# Patient Record
Sex: FEMALE | Race: WHITE | NOT HISPANIC OR LATINO | Employment: OTHER | URBAN - METROPOLITAN AREA
[De-identification: names, ages, dates, MRNs, and addresses within clinical notes are randomized per-mention and may not be internally consistent; named-entity substitution may affect disease eponyms.]

---

## 2018-04-30 ENCOUNTER — HISTORICAL (OUTPATIENT)
Dept: RADIOLOGY | Facility: HOSPITAL | Age: 73
End: 2018-04-30

## 2018-05-01 ENCOUNTER — HISTORICAL (OUTPATIENT)
Dept: ADMINISTRATIVE | Facility: HOSPITAL | Age: 73
End: 2018-05-01

## 2019-03-14 ENCOUNTER — HISTORICAL (OUTPATIENT)
Dept: ADMINISTRATIVE | Facility: HOSPITAL | Age: 74
End: 2019-03-14

## 2019-04-16 ENCOUNTER — HISTORICAL (OUTPATIENT)
Dept: LAB | Facility: HOSPITAL | Age: 74
End: 2019-04-16

## 2020-03-11 ENCOUNTER — HISTORICAL (OUTPATIENT)
Dept: ADMINISTRATIVE | Facility: HOSPITAL | Age: 75
End: 2020-03-11

## 2020-03-11 LAB
ALBUMIN SERPL-MCNC: 4.2 GM/DL (ref 3.4–5)
ALBUMIN/GLOB SERPL: 1.68 {RATIO} (ref 1.5–2.5)
ALP SERPL-CCNC: 91 UNIT/L (ref 38–126)
ALT SERPL-CCNC: 14 UNIT/L (ref 7–52)
AST SERPL-CCNC: 13 UNIT/L (ref 15–37)
BILIRUB SERPL-MCNC: 0.8 MG/DL (ref 0.2–1)
BILIRUBIN DIRECT+TOT PNL SERPL-MCNC: 0.2 MG/DL (ref 0–0.5)
BILIRUBIN DIRECT+TOT PNL SERPL-MCNC: 0.6 MG/DL
BUN SERPL-MCNC: 15 MG/DL (ref 7–18)
CALCIUM SERPL-MCNC: 9 MG/DL (ref 8.5–10)
CHLORIDE SERPL-SCNC: 101 MMOL/L (ref 98–107)
CHOLEST SERPL-MCNC: 198 MG/DL (ref 0–200)
CHOLEST/HDLC SERPL: 4.3 {RATIO}
CO2 SERPL-SCNC: 30 MMOL/L (ref 21–32)
CREAT SERPL-MCNC: 0.93 MG/DL (ref 0.6–1.3)
EST. AVERAGE GLUCOSE BLD GHB EST-MCNC: 157 MG/DL
GLOBULIN SER-MCNC: 2 GM/DL (ref 1.2–3)
GLUCOSE SERPL-MCNC: 154 MG/DL (ref 74–106)
HBA1C MFR BLD: 7.1 % (ref 4.4–6.4)
HDLC SERPL-MCNC: 46 MG/DL (ref 35–60)
LDLC SERPL CALC-MCNC: 137 MG/DL (ref 0–129)
POTASSIUM SERPL-SCNC: 4.2 MMOL/L (ref 3.5–5.1)
PROT SERPL-MCNC: 6.7 GM/DL (ref 6.4–8.2)
SODIUM SERPL-SCNC: 137 MMOL/L (ref 136–145)
TRIGL SERPL-MCNC: 175 MG/DL (ref 30–150)
VLDLC SERPL CALC-MCNC: 35 MG/DL

## 2020-06-10 ENCOUNTER — HISTORICAL (OUTPATIENT)
Dept: ADMINISTRATIVE | Facility: HOSPITAL | Age: 75
End: 2020-06-10

## 2020-06-10 LAB
ALBUMIN SERPL-MCNC: 4.3 GM/DL (ref 3.4–5)
ALBUMIN/GLOB SERPL: 1.79 {RATIO} (ref 1.5–2.5)
ALP SERPL-CCNC: 89 UNIT/L (ref 38–126)
ALT SERPL-CCNC: 16 UNIT/L (ref 7–52)
AST SERPL-CCNC: 14 UNIT/L (ref 15–37)
BILIRUB SERPL-MCNC: 0.7 MG/DL (ref 0.2–1)
BILIRUBIN DIRECT+TOT PNL SERPL-MCNC: 0.1 MG/DL (ref 0–0.5)
BILIRUBIN DIRECT+TOT PNL SERPL-MCNC: 0.6 MG/DL
BUN SERPL-MCNC: 10 MG/DL (ref 7–18)
CALCIUM SERPL-MCNC: 9.5 MG/DL (ref 8.5–10)
CHLORIDE SERPL-SCNC: 101 MMOL/L (ref 98–107)
CHOLEST SERPL-MCNC: 227 MG/DL (ref 0–200)
CHOLEST/HDLC SERPL: 4.8 {RATIO}
CO2 SERPL-SCNC: 30 MMOL/L (ref 21–32)
CREAT SERPL-MCNC: 0.83 MG/DL (ref 0.6–1.3)
EST. AVERAGE GLUCOSE BLD GHB EST-MCNC: 148 MG/DL
GLOBULIN SER-MCNC: 2.4 GM/DL (ref 1.2–3)
GLUCOSE SERPL-MCNC: 135 MG/DL (ref 74–106)
HBA1C MFR BLD: 6.8 % (ref 4.4–6.4)
HDLC SERPL-MCNC: 47 MG/DL (ref 35–60)
LDLC SERPL CALC-MCNC: 147 MG/DL (ref 0–129)
POTASSIUM SERPL-SCNC: 4.5 MMOL/L (ref 3.5–5.1)
PROT SERPL-MCNC: 6.7 GM/DL (ref 6.4–8.2)
SODIUM SERPL-SCNC: 139 MMOL/L (ref 136–145)
TRIGL SERPL-MCNC: 240 MG/DL (ref 30–150)
TSH SERPL-ACNC: 1.46 MIU/ML (ref 0.35–4.94)
VLDLC SERPL CALC-MCNC: 48 MG/DL

## 2020-10-07 ENCOUNTER — HISTORICAL (OUTPATIENT)
Dept: ADMINISTRATIVE | Facility: HOSPITAL | Age: 75
End: 2020-10-07

## 2021-09-15 ENCOUNTER — HISTORICAL (OUTPATIENT)
Dept: ADMINISTRATIVE | Facility: HOSPITAL | Age: 76
End: 2021-09-15

## 2021-09-15 LAB
ABS NEUT (OLG): 5.3 X10(3)/MCL (ref 2.1–9.2)
ALBUMIN SERPL-MCNC: 4.5 GM/DL (ref 3.4–5)
ALBUMIN/GLOB SERPL: 2.14 {RATIO} (ref 1.5–2.5)
ALP SERPL-CCNC: 67 UNIT/L (ref 38–126)
ALT SERPL-CCNC: 20 UNIT/L (ref 7–52)
AST SERPL-CCNC: 19 UNIT/L (ref 15–37)
BILIRUB SERPL-MCNC: 0.6 MG/DL (ref 0.2–1)
BILIRUBIN DIRECT+TOT PNL SERPL-MCNC: 0.1 MG/DL (ref 0–0.5)
BILIRUBIN DIRECT+TOT PNL SERPL-MCNC: 0.5 MG/DL
BUN SERPL-MCNC: 12 MG/DL (ref 7–18)
CALCIUM SERPL-MCNC: 9.4 MG/DL (ref 8.5–10)
CHLORIDE SERPL-SCNC: 101 MMOL/L (ref 98–107)
CHOLEST SERPL-MCNC: 212 MG/DL (ref 0–200)
CHOLEST/HDLC SERPL: 4.6 {RATIO}
CO2 SERPL-SCNC: 28 MMOL/L (ref 21–32)
CREAT SERPL-MCNC: 0.94 MG/DL (ref 0.6–1.3)
CREAT UR-MCNC: 200 MG/DL
DEPRECATED CALCIDIOL+CALCIFEROL SERPL-MC: 59 NG/ML (ref 30–80)
ERYTHROCYTE [DISTWIDTH] IN BLOOD BY AUTOMATED COUNT: 12.3 % (ref 11.5–17)
EST CREAT CLEARANCE SER (OHS): 61.64 ML/MIN
EST. AVERAGE GLUCOSE BLD GHB EST-MCNC: 137 MG/DL
GLOBULIN SER-MCNC: 2.1 GM/DL (ref 1.2–3)
GLUCOSE SERPL-MCNC: 115 MG/DL (ref 74–106)
HBA1C MFR BLD: 6.4 % (ref 4.4–6.4)
HCT VFR BLD AUTO: 42.9 % (ref 37–47)
HDLC SERPL-MCNC: 46 MG/DL (ref 35–60)
HGB BLD-MCNC: 14.1 GM/DL (ref 12–16)
LDLC SERPL CALC-MCNC: 137 MG/DL (ref 0–129)
LYMPHOCYTES # BLD AUTO: 1.5 X10(3)/MCL (ref 0.6–3.4)
LYMPHOCYTES NFR BLD AUTO: 20.9 % (ref 13–40)
MCH RBC QN AUTO: 29.1 PG (ref 27–31.2)
MCHC RBC AUTO-ENTMCNC: 33 GM/DL (ref 32–36)
MCV RBC AUTO: 88 FL (ref 80–94)
MICROALBUMIN UR-MCNC: 30 MG/L
MICROALBUMIN/CREAT RATIO PNL UR: <30 MG/GM
MONOCYTES # BLD AUTO: 0.4 X10(3)/MCL (ref 0.1–1.3)
MONOCYTES NFR BLD AUTO: 5.2 % (ref 0.1–24)
NEUTROPHILS NFR BLD AUTO: 73.9 % (ref 47–80)
PLATELET # BLD AUTO: 276 X10(3)/MCL (ref 130–400)
PMV BLD AUTO: 9.2 FL (ref 9.4–12.4)
POTASSIUM SERPL-SCNC: 4.8 MMOL/L (ref 3.5–5.1)
PROT SERPL-MCNC: 6.6 GM/DL (ref 6.4–8.2)
RBC # BLD AUTO: 4.85 X10(6)/MCL (ref 4.2–5.4)
SODIUM SERPL-SCNC: 139 MMOL/L (ref 136–145)
TRIGL SERPL-MCNC: 219 MG/DL (ref 30–150)
TSH SERPL-ACNC: 2.07 MIU/ML (ref 0.35–4.94)
VLDLC SERPL CALC-MCNC: 43.8 MG/DL
WBC # SPEC AUTO: 7.2 X10(3)/MCL (ref 4.5–11.5)

## 2022-03-25 ENCOUNTER — HISTORICAL (OUTPATIENT)
Dept: ADMINISTRATIVE | Facility: HOSPITAL | Age: 77
End: 2022-03-25

## 2022-03-25 ENCOUNTER — HISTORICAL (OUTPATIENT)
Dept: RADIOLOGY | Facility: HOSPITAL | Age: 77
End: 2022-03-25

## 2022-04-06 ENCOUNTER — HISTORICAL (OUTPATIENT)
Dept: CARDIOLOGY | Facility: HOSPITAL | Age: 77
End: 2022-04-06

## 2022-04-10 ENCOUNTER — HISTORICAL (OUTPATIENT)
Dept: ADMINISTRATIVE | Facility: HOSPITAL | Age: 77
End: 2022-04-10

## 2022-04-11 ENCOUNTER — HISTORICAL (OUTPATIENT)
Dept: ADMINISTRATIVE | Facility: HOSPITAL | Age: 77
End: 2022-04-11

## 2022-04-11 LAB
BUN SERPL-MCNC: 11.9 MG/DL (ref 9.8–20.1)
CALCIUM SERPL-MCNC: 9.6 MG/DL (ref 8.7–10.5)
CHLORIDE SERPL-SCNC: 101 MMOL/L (ref 98–107)
CO2 SERPL-SCNC: 29 MMOL/L (ref 23–31)
CREAT SERPL-MCNC: 0.86 MG/DL (ref 0.55–1.02)
CREAT/UREA NIT SERPL: 14
ERYTHROCYTE [DISTWIDTH] IN BLOOD BY AUTOMATED COUNT: 12.7 % (ref 11.5–17)
GLUCOSE SERPL-MCNC: 126 MG/DL (ref 82–115)
HCT VFR BLD AUTO: 40.4 % (ref 37–47)
HEMOLYSIS INTERF INDEX SERPL-ACNC: 2
HGB BLD-MCNC: 13.1 G/DL (ref 12–16)
ICTERIC INTERF INDEX SERPL-ACNC: 0
LIPEMIC INTERF INDEX SERPL-ACNC: 22
MCH RBC QN AUTO: 28.2 PG (ref 27–31)
MCHC RBC AUTO-ENTMCNC: 32.4 G/DL (ref 33–36)
MCV RBC AUTO: 87.1 FL (ref 80–94)
PLATELET # BLD AUTO: 283 10*3/UL (ref 130–400)
PMV BLD AUTO: 10.1 FL (ref 9.4–12.4)
POTASSIUM SERPL-SCNC: 4.6 MMOL/L (ref 3.5–5.1)
RBC # BLD AUTO: 4.64 10*6/UL (ref 4.2–5.4)
SODIUM SERPL-SCNC: 138 MMOL/L (ref 136–145)
WBC # SPEC AUTO: 6.5 10*3/UL (ref 4.5–11.5)

## 2022-04-13 ENCOUNTER — HISTORICAL (OUTPATIENT)
Dept: CARDIOLOGY | Facility: HOSPITAL | Age: 77
End: 2022-04-13

## 2022-04-29 VITALS
HEIGHT: 61 IN | BODY MASS INDEX: 32.92 KG/M2 | DIASTOLIC BLOOD PRESSURE: 78 MMHG | BODY MASS INDEX: 32.42 KG/M2 | SYSTOLIC BLOOD PRESSURE: 140 MMHG | HEIGHT: 61 IN | HEIGHT: 61 IN | DIASTOLIC BLOOD PRESSURE: 92 MMHG | SYSTOLIC BLOOD PRESSURE: 140 MMHG | WEIGHT: 171.75 LBS | DIASTOLIC BLOOD PRESSURE: 86 MMHG | WEIGHT: 172.63 LBS | SYSTOLIC BLOOD PRESSURE: 138 MMHG | WEIGHT: 174.38 LBS | DIASTOLIC BLOOD PRESSURE: 78 MMHG | HEIGHT: 61 IN | WEIGHT: 172.19 LBS | SYSTOLIC BLOOD PRESSURE: 136 MMHG | BODY MASS INDEX: 32.51 KG/M2 | BODY MASS INDEX: 32.59 KG/M2

## 2022-05-02 NOTE — HISTORICAL [facility] CERNER
This is a historical note converted from Renzo. Formatting and pictures may have been removed.  Please reference Renzo for original formatting and attached multimedia. Chief Complaint  RECHECK LOWER BACK PAIN, NOT BETTER SINCE PT  History of Present Illness  Worsening low back pain over past 2 years. Completed 12 session of physical therapy at [facility] but it only seemed to make pain worse. Denies any LE weakness or numbness.  Pain mainly to lower?back that is constant. Limits things that she can do around the house.  Review of Systems  Constitutional:?no fever, no weakness, no weight loss, no fatigue  Musculoskeletal:See HPI  Integumentary:?no skin rash or abnormal lesion  Neuro:?no headaches, dizziness, or weakness  ?  Physical Exam  Vitals & Measurements  T:?36.8? ?C (Oral)? HR:?68(Peripheral)? BP:?138/78?  HT:?154?cm? WT:?78.1?kg? BMI:?32.93?  General:?Well developed, well-nourished, in no acute distress  M/S:?Paralumbar tenderness, -SLR, Limited ROM to L-spine  Neuro:?no motor/sensory deficits, Reflexes 2+ throughout, CN II-XII intact  Integumentary:?no rashes or skin lesions present  ?  ?  Assessment/Plan  1.?DDD (degenerative disc disease), lumbar?M51.36  ?Xray: DDD, Anterolithesis to L4  ?Failed physical therapy (MTS)  ?MRI L-spine without contrast  ?Start Meloxicam 7.5mg PO q day (30,1)  Ordered:  Office/Outpatient Visit Level 3 Established 11713 PC, DDD (degenerative disc disease), lumbar, [facility], 03/14/19 14:03:00 CDT  Schedule Diagnostics Study, MRI L-spine without contrast, First available, 03/14/19 14:05:00 CDT, DDD (degenerative disc disease), lumbar  ?  Lumbar pain?M54.5  Ordered:  XR Spine Lumbar 2 or 3 Views, Routine, 03/14/19 13:43:00 CDT, Back Pain, None, Ambulatory, Rad Type, Lumbar pain, [facility], 03/14/19 13:43:00 CDT  ?  Orders:  meloxicam, 7.5 mg = 1 tab(s), Oral, Daily, # 30 tab(s), 2 Refill(s), Pharmacy: [facility]/pharmacy #[id number]  Clinic Follow-up PRN,  03/14/19 14:03:00 CDT, [facility], Future Order   Problem List/Past Medical History  Ongoing  Benign essential HTN  Chronic GERD  Diabetes mellitus, type II  Hypothyroidism  IC (interstitial cystitis)  Osteoarthritis of right knee  Stress incontinence, female  Historical  Sebaceous cyst  Procedure/Surgical History  Colonoscopy (03/11/2013)  Esophagogastroduodenoscopy (03/04/2013)  Arthroplasty of right shoulder  Bunionectomy  Cholecystectomy  Hysterectomy  Suspension of bladder   Medications  Aspir 81 oral delayed release tablet, 81 mg= 1 tab(s), Oral, Daily  carvedilol 3.125 mg oral tablet, See Instructions, 1 refills  celecoxib 200 mg oral capsule, See Instructions, 2 refills  esomeprazole 40 mg oral DR capsule, 40 mg= 1 cap(s), Oral, Daily, 3 refills  ezetimibe 10 mg oral tablet, See Instructions, 1 refills  Flax Seed Oil oral capsule  glyburide-metformin 1.25 mg-250 mg oral tablet, See Instructions, 1 refills  levothyroxine 50 mcg (0.05 mg) oral tablet, See Instructions, 1 refills  meclizine 25 mg oral tablet, 25 mg= 1 tab(s), Oral, BID  meloxicam 7.5 mg oral tablet, 7.5 mg= 1 tab(s), Oral, Daily, 2 refills  metformin 500 mg oral tablet, See Instructions, 1 refills  omeprazole 40 mg oral DR capsule, 40 mg= 1 cap(s), Oral, Daily, 2 refills  ONE TOUCH ULTRA BLUE TEST STRP, See Instructions, 5 refills  traMADol 50 mg oral tablet, 50 mg= 1 tab(s), Oral, q4hr  Vitamin B-12 1000 mcg oral tablet, 1000 mcg= 1 tab(s), Oral, Daily  Vitamin D3 2000 intl units oral tablet, 2000 IntUnit= 1 tab(s), Oral, Daily  vitamin E 400 intl units oral capsule, 400 IntUnit= 1 cap(s), Oral, Daily  Allergies  Latex?(UNKNOWN)  niacin?(UNKNOWN)  Social History  Alcohol  Current, Beer, Wine, 1-2 times per month, 04/09/2018  Employment/School  Retired, 04/09/2018  Exercise  Exercise duration: 3. Exercise frequency: 1-2 times/week. Exercise type: Aerobics., 04/09/2018  Home/Environment  Lives with Spouse.,  04/09/2018  Nutrition/Health  Diabetic, 04/09/2018  Substance Abuse  Never, 04/09/2018  Tobacco  Never (less than 100 in lifetime), N/A, 03/14/2019  Family History  Acute myocardial infarction.: Mother.  Cancer: Sister.  Diabetes mellitus type 2: Father.  Immunizations  Vaccine Date Status   influenza virus vaccine, inactivated 12/07/2018 Given   influenza virus vaccine, inactivated 10/02/2017 Recorded   Health Maintenance  Health Maintenance  ???Pending?(in the next year)  ??? ??Due?  ??? ? ? ?ADL Screening due??03/14/19??and every 1??year(s)  ??? ? ? ?Advance Directive due??03/14/19??and every 1??year(s)  ??? ? ? ?Alcohol Misuse Screening due??03/14/19??and every 1??year(s)  ??? ? ? ?Bone Density Screening due??03/14/19??Variable frequency  ??? ? ? ?Cognitive Screening due??03/14/19??and every 1??year(s)  ??? ? ? ?Diabetes Maintenance-Microalbumin due??03/14/19??Variable frequency  ??? ? ? ?Diabetes Maintenance-Urine Dipstick due??03/14/19??Variable frequency  ??? ? ? ?Diabetes Maintenance-Eye Exam due??03/14/19??and every?  ??? ? ? ?Diabetes Maintenance-Foot Exam due??03/14/19??and every?  ??? ? ? ?Fall Risk Assessment due??03/14/19??and every 1??year(s)  ??? ? ? ?Functional Assessment due??03/14/19??and every 1??year(s)  ??? ? ? ?Geriatric Depression Screening due??03/14/19??and every 1??year(s)  ??? ? ? ?Hypertension Management-Education due??03/14/19??and every 1??year(s)  ??? ? ? ?Pneumococcal Vaccine due??03/14/19??Variable frequency  ??? ? ? ?Pneumococcal Vaccine due??03/14/19??and every?  ??? ? ? ?Smoking Cessation due??03/14/19??Variable frequency  ??? ? ? ?Tetanus Vaccine due??03/14/19??and every 10??year(s)  ??? ? ? ?Zoster Vaccine due??03/14/19??and every 100??year(s)  ??? ??Due In Future?  ??? ? ? ?Aspirin Therapy for CVD Prevention not due until??04/05/19??and every 1??year(s)  ??? ? ? ?Diabetes Maintenance-Fasting Lipid Profile not due until??12/07/19??and every 1??year(s)  ??? ? ? ?Diabetes  Maintenance-HgbA1c not due until??12/07/19??and every 1??year(s)  ??? ? ? ?Hypertension Management-BMP not due until??12/07/19??and every 1??year(s)  ??? ? ? ?Diabetes Maintenance-Serum Creatinine not due until??12/07/19??and every 1??year(s)  ??? ? ? ?Hypertension Management-Blood Pressure not due until??03/13/20??and every 1??year(s)  ???Satisfied?(in the past 1 year)  ??? ??Satisfied?  ??? ? ? ?Aspirin Therapy for CVD Prevention on??04/05/18.  ??? ? ? ?Blood Pressure Screening on??03/14/19.??Satisfied by Neena Ceballos LPN  ??? ? ? ?Body Mass Index Check on??03/14/19.??Satisfied by Neena Ceballos LPN  ??? ? ? ?Breast Cancer Screening on??04/30/18.??Satisfied by Jacqueline Ballard  ??? ? ? ?Breast Cancer Screening ([facility]) on??04/30/18.??Satisfied by Jacqueline Ballard  ??? ? ? ?Depression Screening on??03/14/19.??Satisfied by Neena Ceballos LPN  ??? ? ? ?Diabetes Maintenance-Fasting Lipid Profile on??12/07/18.??Satisfied by Bozena Philip  ??? ? ? ?Diabetes Maintenance-HgbA1c on??12/07/18.??Satisfied by Jame Buchanan  ??? ? ? ?Diabetes Maintenance-Serum Creatinine on??12/07/18.??Satisfied by Bozena Philip  ??? ? ? ?Diabetes Screening on??12/07/18.??Satisfied by Bozena Philip  ??? ? ? ?Hypertension Management-BMP on??12/07/18.??Satisfied by Bozena Philip  ??? ? ? ?Hypertension Management-Blood Pressure on??03/14/19.??Satisfied by Neena Ceballos LPN  ??? ? ? ?Influenza Vaccine on??12/07/18.??Satisfied by Neena Ceballos LPN  ??? ? ? ?Lipid Screening on??12/07/18.??Satisfied by Bozena Philip  ??? ? ? ?Obesity Screening on??03/14/19.??Satisfied by Neena Ceballos LPN  ?  ?

## 2022-05-02 NOTE — HISTORICAL [facility] CERNER
This is a historical note converted from Renzo. Formatting and pictures may have been removed.  Please reference Renzo for original formatting and attached multimedia. History of Present Illness  74 year old WF presents for 3-month follow-up for diabetes hypertension and hyperlipidemia.? Overall doing well with no complaints, she is concerned about her weight gain?she has been having problems losing weight despite?her compliance with an ADA diet.? Minimal exercise?overall  PMH: HLD, GERD, Hypothyroidism, Type II DM, IC, Stress Incontinence  ?  ?  Review of Systems  General:???Patient reports energy level is??  good. Denies weight change.??Denies fever,chills, night sweats, or weakness.??  Denies fatigue.?Integument:???Denies any nevus changes, rashes, urticaria,??or sores.??Also denies itching or areas of numbness.  HEENT:???Denies vision changes or eye pain.??No sore throat, ear pain, sinus pressure or discharge.  Cardiovascular:???Denies chest pain, palpitations, dyspnea on exertion, orthopnea.  Respiratory:???No cough, wheezing, shortness of breath, or sputum.  GI:???Denies nausea, emesis, constipation, diarrhea, melena, hematochezia or abdominal pain  ?  ?  Physical Exam  General:???Well-developed and??nourished, no apparent distress, alert and oriented??4.  Integument:???Skin is intact with no erythema.??No pustules or vesicles.??No rash or scale. No Lymphadenopathy.??No urticaria.??No abnormal nevi.  HEENT:???PERRLA, EOMI ; TMs and EACs clear, normal turbinates with no erythema, normal mucosa, no sinus tenderness; no erythema or exudate of mouth or pharynx.  Neck:???Supple, no lymphadenopathy, no thyromegaly, no bruits, no jugular venous distention.  Cardiovascular:???Regular rhythm and rate, no murmurs, radial and dorsal pedal pulses 2+ bilaterally.  Respiratory:???Lungs clear to auscultation bilaterally, no wheezes, no crackles, no rhonchi.??Good air movement.  Abdomen:???NABS, soft, nontender, no  hepatosplenomegaly, no masses, no guarding or rebound.?  ?  ?  Assessment/Plan  Diabetes?E11.9  ?Labs today, further orders after.? We will considered?stopping the glyburide?if her A1c is controlled?to help with weight loss,?we will also wait for her TSH.  Ordered:  Clinic Follow up, *Est. 09/10/20 3:00:00 CDT, Order for future visit, Diabetes  HTN (hypertension), benign, [facility]  Hemoglobin A1c, Routine collect, 06/10/20 9:58:00 CDT, Blood, Stop date 06/10/20 9:58:00 CDT, Lab Collect, Diabetes, 06/10/20 9:58:00 CDT  Office/Outpatient Visit Level 4 Established 10348 PC, Diabetes  Hyperlipidemia, mild  HTN (hypertension), benign, [facility], 06/10/20 10:48:00 CDT  ?  HTN (hypertension), benign?I10  ?Trolled  Ordered:  Clinic Follow up, *Est. 09/10/20 3:00:00 CDT, Order for future visit, Diabetes  HTN (hypertension), benign, [facility]  Comprehensive Metabolic Panel, Routine collect, 06/10/20 9:58:00 CDT, Blood, Stop date 06/10/20 9:58:00 CDT, Lab Collect, HTN (hypertension), benign, 06/10/20 9:58:00 CDT  Office/Outpatient Visit Level 4 Established 11407 PC, Diabetes  Hyperlipidemia, mild  HTN (hypertension), benign, [facility], 06/10/20 10:48:00 CDT  ?  Hyperlipidemia, mild?E78.5  ?Controlled  Ordered:  Lipid Panel, Routine collect, 06/10/20 9:58:00 CDT, Blood, Stop date 06/10/20 9:58:00 CDT, Lab Collect, Hyperlipidemia, mild, 06/10/20 9:58:00 CDT  Office/Outpatient Visit Level 4 Established 03315 PC, Diabetes  Hyperlipidemia, mild  HTN (hypertension), benign, [facility], 06/10/20 10:48:00 CDT  ?  Hypothyroidism?E03.9  See above  Ordered:  Thyroid Stimulating Hormone, Routine collect, 06/10/20 9:58:00 CDT, Blood, Stop date 06/10/20 9:58:00 CDT, Lab Collect, Hypothyroidism, 06/10/20 9:58:00 CDT  ?  Referrals  Clinic Follow up, *Est. 09/10/20 3:00:00 CDT, Order for future visit, Diabetes  HTN (hypertension), benign, [facility]   Problem List/Past Medical History  Ongoing  Benign essential  HTN  Chronic GERD  Diabetes mellitus, type II  HLD (hyperlipidemia)  Hypothyroidism  IC (interstitial cystitis)  Low serum vitamin D  Osteoarthritis of right knee  Stress incontinence, female  Historical  Sebaceous cyst  Procedure/Surgical History  LUMBAR SX (05/23/2019)  Esophagogastroduodenoscopy (04/16/2019)  Colonoscopy (03/11/2013)  Esophagogastroduodenoscopy (03/04/2013)  Arthroplasty of right shoulder  Bunionectomy  Cholecystectomy  Hysterectomy  Suspension of bladder   Medications  Aspir 81 oral delayed release tablet, 81 mg= 1 tab(s), Oral, Daily  carvedilol 3.125 mg oral tablet, See Instructions  Cinnamon 500 mg oral capsule, 1000 mg= 2 cap(s), Oral, BID  esomeprazole 40 mg oral DR capsule, 40 mg= 1 cap(s), Oral, Daily, 3 refills  Flax Seed Oil oral capsule  glyburide-metformin 1.25 mg-250 mg oral tablet, See Instructions  levothyroxine 50 mcg (0.05 mg) oral tablet, See Instructions,? ?Not taking  meclizine 25 mg oral tablet, 25 mg= 1 tab(s), Oral, BID  metformin 500 mg oral tablet, 500 mg= 1 tab(s), Oral, BID, 1 refills  ONE TOUCH ULTRA BLUE TEST STRIP, See Instructions, 3 refills  ONE TOUCH ULTRA BLUE TEST STRIPS, See Instructions, 1 refills  red yeast rice 600 mg oral capsule, 1200 mg= 2 cap(s), Oral, BID  Vitamin B-12 1000 mcg oral tablet, 1000 mcg= 1 tab(s), Oral, Daily  Vitamin D3 2000 intl units oral tablet, 2000 IntUnit= 1 tab(s), Oral, Daily  vitamin E 400 intl units oral capsule, 400 IntUnit= 1 cap(s), Oral, Daily  Allergies  Latex?(UNKNOWN)  niacin?(UNKNOWN)  Social History  Abuse/Neglect  No, 12/09/2019  Alcohol  Current, Beer, Wine, 1-2 times per month, 04/09/2018  Employment/School  Retired, 04/09/2018  Exercise  Exercise duration: 3. Exercise frequency: 1-2 times/week. Exercise type: Aerobics., 04/09/2018  Home/Environment  Lives with Spouse., 04/09/2018  Nutrition/Health  Diabetic, 04/09/2018  Substance Use  Never, 04/09/2018  Tobacco  Never (less than 100 in lifetime), N/A,  12/09/2019  Family History  Acute myocardial infarction.: Mother.  Cancer: Sister.  Diabetes mellitus type 2: Father.  Immunizations  Vaccine Date Status   influenza virus vaccine, inactivated 12/09/2019 Given   influenza virus vaccine, inactivated 12/07/2018 Given   influenza virus vaccine, inactivated 10/02/2017 Recorded   Health Maintenance  Health Maintenance  ???Pending?(in the next year)  ??? ??OverDue  ??? ? ? ?Aspirin Therapy for CVD Prevention due??04/05/19??and every 1??year(s)  ??? ? ? ?Advance Directive due??01/01/20??and every 1??year(s)  ??? ? ? ?Obesity Screening due??01/01/20??and every 1??year(s)  ??? ? ? ?Smoking Cessation (Diabetes) due??04/30/20??and every 2??year(s)  ??? ? ? ?Diabetes Maintenance-Foot Exam due??06/06/20??and every 1??year(s)  ??? ??Due?  ??? ? ? ?ADL Screening due??06/10/20??and every 1??year(s)  ??? ? ? ?Bone Density Screening due??06/10/20??Variable frequency  ??? ? ? ?Diabetes Maintenance-Urine Dipstick due??06/10/20??Variable frequency  ??? ? ? ?Diabetes Maintenance-Microalbumin due??06/10/20??Variable frequency  ??? ? ? ?Diabetes Maintenance-Eye Exam due??06/10/20??and every?  ??? ? ? ?Hypertension Management-Education due??06/10/20??and every 1??year(s)  ??? ? ? ?Medicare Annual Wellness Exam due??06/10/20??and every 1??year(s)  ??? ? ? ?Pneumococcal Vaccine due??06/10/20??Variable frequency  ??? ? ? ?Pneumococcal Vaccine due??06/10/20??and every?  ??? ? ? ?Tetanus Vaccine due??06/10/20??and every 10??year(s)  ??? ? ? ?Zoster Vaccine due??06/10/20??and every 100??year(s)  ??? ??Due In Future?  ??? ? ? ?Hypertension Management-Blood Pressure not due until??12/08/20??and every 1??year(s)  ??? ? ? ?Cognitive Screening not due until??01/01/21??and every 1??year(s)  ??? ? ? ?Fall Risk Assessment not due until??01/01/21??and every 1??year(s)  ??? ? ? ?Functional Assessment not due until??01/01/21??and every 1??year(s)  ??? ? ? ?Diabetes Maintenance-Fasting Lipid Profile not due  until??03/11/21??and every 1??year(s)  ??? ? ? ?Diabetes Maintenance-HgbA1c not due until??03/11/21??and every 1??year(s)  ??? ? ? ?Hypertension Management-BMP not due until??03/11/21??and every 1??year(s)  ??? ? ? ?Diabetes Maintenance-Serum Creatinine not due until??03/11/21??and every 1??year(s)  ???Satisfied?(in the past 1 year)  ??? ??Satisfied?  ??? ? ? ?Blood Pressure Screening on??12/09/19.??Satisfied by Neena Ceballos LPN  ??? ? ? ?Body Mass Index Check on??12/09/19.??Satisfied by Neena Ceballos LPN  ??? ? ? ?Depression Screening on??12/09/19.??Satisfied by Neena Ceballos LPN  ??? ? ? ?Diabetes Maintenance-Serum Creatinine on??03/11/20.??Satisfied by Jame Buchanan  ??? ? ? ?Diabetes Screening on??03/11/20.??Satisfied by Jame Buchanan  ??? ? ? ?Hypertension Management-BMP on??03/11/20.??Satisfied by Jame Buchanan  ??? ? ? ?Influenza Vaccine on??12/09/19.??Satisfied by Kusum Philip CMA  ??? ? ? ?Lipid Screening on??03/11/20.??Satisfied by Jame Buchanan  ??? ? ? ?Obesity Screening on??12/09/19.??Satisfied by Neena Ceballos LPN  ?      Patient condition discussed?in detail with nurse practitioner.? Agree with plan of care?and follow-up.

## 2022-05-02 NOTE — HISTORICAL [facility] CERNER
This is a historical note converted from Renzo. Formatting and pictures may have been removed.  Please reference [facility] for original formatting and attached multimedia. Chief Complaint  6 MTH RC DM, HTN, HLD VIT D DEF FAST  History of Present Illness  75y/o WF presents for 6m follow up DM, HTN, HLD, hypothyroidism.  She is doing well without acute complaints or concerns.? She takes her medication as prescribed.  She states her fasting blood sugars?have been running 80-100s.  ?  Optho- Dr. Warren/ Dr. Davila (q6m)  Cardiology- Dr. Ivan 8/21- no med changes  Review of Systems  Constitutional:?no fever, no fatigue, no weakness  Psych: no anxiety,?no?depression, no insomnia  Respiratory:?no cough, no wheezing, no shortness of breath  Cardiovascular:?no chest pain, no palpitations, no edema  Gastrointestinal:?no abdominal pain, no nausea, vomiting, diarrhea or constipation  Hema/Lymph:?no abnormal bruising or bleeding  Endocrine:?no heat or cold intolerance, no excessive thirst or excessive urination  Integumentary:?no skin rash or abnormal lesion  Neurologic: no headache, no dizziness, no weakness or numbness  Physical Exam  Vitals & Measurements  T:?36.7? ?C (Oral)? HR:?60(Peripheral)? BP:?136/86?  HT:?154?cm? HT:?154.00?cm? WT:?77.9?kg? WT:?77.900?kg? BMI:?32.85?  General:?well-developed well-nourished in no acute distress  Neck: no thyromegaly, no?lymphadenopathy, no carotid bruits  Respiratory:?respirations even and unlabored, clear to auscultation bilaterally  Cardiovascular:?regular rate and rhythm without murmurs, gallops or rubs  Musculoskeletal:?full range of motion of all extremities/spine  Integumentary: no rashes or skin lesions present  Neurologic: grossly intact  Assessment/Plan  1.?Diabetes mellitus, type II?E11.9  ?Hemoglobin A1c, urine microalbumin-we will call patient with results.  Continue?glyburide-Metformin?1.25 mg / 250 mg twice daily, metformin 500 mg twice daily.  Ordered:  Clinic Follow up,  *Est. 03/15/22 3:00:00 CDT, Order for future visit, Diabetes mellitus, type II  Benign essential HTN  HLD (hyperlipidemia)  Hypothyroidism  Low serum vitamin D, HLink AFP  Comprehensive Metabolic Panel, Routine collect, 09/15/21 9:47:00 CDT, Blood, Stop date 09/15/21 9:47:00 CDT, Lab Collect, Benign essential HTN  Diabetes mellitus, type II  HLD (hyperlipidemia), 09/15/21 9:47:00 CDT  Hemoglobin A1c, Routine collect, 09/15/21 9:47:00 CDT, Blood, Stop date 09/15/21 9:47:00 CDT, Lab Collect, Diabetes mellitus, type II, 09/15/21 9:47:00 CDT  Lab Collection Request, 09/15/21 10:05:00 CDT, HLINK AMB - AFP, 09/15/21 10:05:00 CDT, Diabetes mellitus, type II  Microalbum/Creatinine Ratio Urine (Microalb/Creat), Routine collect, Urine, Order for future visit, 09/15/21 10:05:00 CDT, Stop date 09/15/21 10:05:00 CDT, Nurse collect, Diabetes mellitus, type II  Office/Outpatient Visit Level 3 Established 88820 PC, Diabetes mellitus, type II  Benign essential HTN  HLD (hyperlipidemia)  Hypothyroidism  Low serum vitamin D, HLINK AMB - AFP, 09/15/21 10:10:00 CDT  ?  2.?Benign essential HTN?I10  ?CBC, CMP-we will call patient with results.  Continue carvedilol 3.125 mg daily.  Ordered:  Automated Diff, Routine collect, 09/15/21 9:47:00 CDT, Blood, Collected, Stop date 09/15/21 9:47:00 CDT, Lab Collect, Benign essential HTN, 09/15/21 9:47:00 CDT  CBC w/ Auto Diff, Routine collect, 09/15/21 9:47:00 CDT, Blood, Stop date 09/15/21 9:47:00 CDT, Lab Collect, Benign essential HTN, 09/15/21 9:47:00 CDT  Clinic Follow up, *Est. 03/15/22 3:00:00 CDT, Order for future visit, Diabetes mellitus, type II  Benign essential HTN  HLD (hyperlipidemia)  Hypothyroidism  Low serum vitamin D, HLink AFP  Comprehensive Metabolic Panel, Routine collect, 09/15/21 9:47:00 CDT, Blood, Stop date 09/15/21 9:47:00 CDT, Lab Collect, Benign essential HTN  Diabetes mellitus, type II  HLD (hyperlipidemia), 09/15/21 9:47:00 CDT  Office/Outpatient Visit  Level 3 Established [id number] PC, Diabetes mellitus, type II  Benign essential HTN  HLD (hyperlipidemia)  Hypothyroidism  Low serum vitamin D, HLINK AMB - AFP, 09/15/21 10:10:00 CDT  ?  3.?HLD (hyperlipidemia)?E78.5  ?FLP-we will call patient with results.  Continue low-fat, low-cholesterol diet, continue red yeast rice,?omega-3 fish oil.  Ordered:  Clinic Follow up, *Est. 03/15/22 3:00:00 CDT, Order for future visit, Diabetes mellitus, type II  Benign essential HTN  HLD (hyperlipidemia)  Hypothyroidism  Low serum vitamin D, HLink AFP  Comprehensive Metabolic Panel, Routine collect, 09/15/21 9:47:00 CDT, Blood, Stop date 09/15/21 9:47:00 CDT, Lab Collect, Benign essential HTN  Diabetes mellitus, type II  HLD (hyperlipidemia), 09/15/21 9:47:00 CDT  Lipid Panel, Routine collect, 09/15/21 9:47:00 CDT, Blood, Stop date 09/15/21 9:47:00 CDT, Lab Collect, HLD (hyperlipidemia), 09/15/21 9:47:00 CDT  Office/Outpatient Visit Level 3 Established [id number] PC, Diabetes mellitus, type II  Benign essential HTN  HLD (hyperlipidemia)  Hypothyroidism  Low serum vitamin D, HLINK AMB - AFP, 09/15/21 10:10:00 CDT  ?  4.?Hypothyroidism?E03.9  ?TSH-we will call patient with results.  Ordered:  Clinic Follow up, *Est. 03/15/22 3:00:00 CDT, Order for future visit, Diabetes mellitus, type II  Benign essential HTN  HLD (hyperlipidemia)  Hypothyroidism  Low serum vitamin D, HLink AFP  Office/Outpatient Visit Level 3 Established [id number] PC, Diabetes mellitus, type II  Benign essential HTN  HLD (hyperlipidemia)  Hypothyroidism  Low serum vitamin D, HLINK AMB - AFP, 09/15/21 10:10:00 CDT  Thyroid Stimulating Hormone, Routine collect, 09/15/21 9:47:00 CDT, Blood, Stop date 09/15/21 9:47:00 CDT, Lab Collect, Hypothyroidism, 09/15/21 9:47:00 CDT  ?  5.?Low serum vitamin D?R79.89  ?Vitamin D level-we will call patient with results.  Continue?cholecalciferol?2000 units daily.  Ordered:  Clinic Follow up, *Est. 03/15/22 3:00:00 CDT, Order  for future visit, Diabetes mellitus, type II  Benign essential HTN  HLD (hyperlipidemia)  Hypothyroidism  Low serum vitamin D, HLink AFP  Office/Outpatient Visit Level 3 Established [id number] PC, Diabetes mellitus, type II  Benign essential HTN  HLD (hyperlipidemia)  Hypothyroidism  Low serum vitamin D, HLINK AMB - AFP, 09/15/21 10:10:00 CDT  Vitamin D, 25-Hydroxy Level, Routine collect, 09/15/21 9:47:00 CDT, Blood, Stop date 09/15/21 9:47:00 CDT, Lab Collect, Low serum vitamin D, 09/15/21 9:47:00 CDT  ?  Referrals  Clinic Follow up, *Est. 03/15/22 3:00:00 CDT, Order for future visit, Diabetes mellitus, type II  Benign essential HTN  HLD (hyperlipidemia)  Hypothyroidism  Low serum vitamin D, HLink AFP   Problem List/Past Medical History  Ongoing  Benign essential HTN  Chronic GERD  Diabetes mellitus, type II  HLD (hyperlipidemia)  Hypothyroidism  IC (interstitial cystitis)  Low serum vitamin D  Osteoarthritis of right knee  Stress incontinence, female  Historical  Sebaceous cyst  Procedure/Surgical History  LUMBAR SX (07/24/2019)  Bone and/or joint imaging; whole body (06/24/2019)  Esophagogastroduodenoscopy (04/16/2019)  Colonoscopy (03/11/2013)  Esophagogastroduodenoscopy (03/04/2013)  Arthroplasty of right shoulder  Bunionectomy  Cholecystectomy  Hysterectomy  Suspension of bladder   Medications  Aspir 81 oral delayed release tablet, 81 mg= 1 tab(s), Oral, Daily  carvedilol 3.125 mg oral tablet, See Instructions  Cinnamon 500 mg oral capsule, 1000 mg= 2 cap(s), Oral, BID  esomeprazole 40 mg oral DR capsule, 40 mg= 1 cap(s), Oral, Daily, 3 refills  Flax Seed Oil oral capsule  glyburide-metformin 1.25 mg-250 mg oral tablet, See Instructions  meclizine 25 mg oral tablet, 25 mg= 1 tab(s), Oral, BID  metformin 500 mg oral tablet, See Instructions, 1 refills  ONE TOUCH ULTRA BLUE TEST STRIP, See Instructions, 3 refills  ONE TOUCH ULTRA BLUE TEST STRIPS, See Instructions, 1 refills  ONE TOUCH ULTRA BLUE TEST  STRIPS, See Instructions, 1 refills  red yeast rice 600 mg oral capsule, 1200 mg= 2 cap(s), Oral, BID  Vitamin B-12 1000 mcg oral tablet, 1000 mcg= 1 tab(s), Oral, Daily  Vitamin D3 2000 intl units oral tablet, 2000 IntUnit= 1 tab(s), Oral, Daily  vitamin E 400 intl units oral capsule, 400 IntUnit= 1 cap(s), Oral, Daily  Allergies  lisinopril?(Other)  Latex?(UNKNOWN)  niacin?(UNKNOWN)  Social History  Abuse/Neglect  No, 09/15/2021  Alcohol  Current, Beer, Wine, 1-2 times per month, 04/09/2018  Employment/School  Retired, 04/09/2018  Exercise  Exercise duration: 3. Exercise frequency: 1-2 times/week. Exercise type: Aerobics., 04/09/2018  Home/Environment  Lives with Spouse., 04/09/2018  Nutrition/Health  Diabetic, 04/09/2018  Substance Use  Never, 04/09/2018  Tobacco  Never (less than 100 in lifetime), N/A, 09/15/2021  Family History  Acute myocardial infarction.: Mother.  Cancer: Sister.  Diabetes mellitus type 2: Father.  Immunizations  Vaccine Date Status   influenza virus vaccine, inactivated 09/15/2021 Given   COVID-19 MRNA, LNP-S, PF- Pfizer 03/04/2021 Given   COVID-19 MRNA, LNP-S, PF- Pfizer 02/11/2021 Given   influenza virus vaccine, inactivated 09/10/2020 Given   influenza virus vaccine, inactivated 12/09/2019 Given   influenza virus vaccine, inactivated 12/07/2018 Given   influenza virus vaccine, inactivated 10/02/2017 Recorded   Health Maintenance  Health Maintenance  ???Pending?(in the next year)  ??? ??OverDue  ??? ? ? ?Aspirin Therapy for CVD Prevention due??04/05/19??and every 1??year(s)  ??? ? ? ?Diabetes Maintenance-Foot Exam due??06/06/20??and every 1??year(s)  ??? ? ? ?Advance Directive due??01/02/21??and every 1??year(s)  ??? ? ? ?Cognitive Screening due??01/02/21??and every 1??year(s)  ??? ? ? ?Fall Risk Assessment due??01/02/21??and every 1??year(s)  ??? ? ? ?Functional Assessment due??01/02/21??and every 1??year(s)  ??? ??Due?  ??? ? ? ?ADL Screening due??09/15/21??and every 1??year(s)  ??? ? ?  ?Bone Density Screening due??09/15/21??Variable frequency  ??? ? ? ?Diabetes Maintenance-Eye Exam due??09/15/21??Unknown Frequency  ??? ? ? ?Diabetes Maintenance-Microalbumin due??09/15/21??Unknown Frequency  ??? ? ? ?Hypertension Management-Education due??09/15/21??and every 1??year(s)  ??? ? ? ?Pneumococcal Vaccine due??09/15/21??Unknown Frequency  ??? ? ? ?Tetanus Vaccine due??09/15/21??and every 10??year(s)  ??? ? ? ?Zoster Vaccine due??09/15/21??Unknown Frequency  ??? ??Due In Future?  ??? ? ? ?Obesity Screening not due until??01/01/22??and every 1??year(s)  ??? ? ? ?Diabetes Maintenance-HgbA1c not due until??03/10/22??and every 1??year(s)  ??? ? ? ?Hypertension Management-BMP not due until??03/10/22??and every 1??year(s)  ??? ? ? ?Diabetes Maintenance-Serum Creatinine not due until??03/10/22??and every 1??year(s)  ??? ? ? ?Diabetes Maintenance-Fasting Lipid Profile not due until??03/10/22??and every 1??year(s)  ??? ? ? ?Medicare Annual Wellness Exam not due until??03/10/22??and every 1??year(s)  ???Satisfied?(in the past 1 year)  ??? ??Satisfied?  ??? ? ? ?Blood Pressure Screening on??09/15/21.??Satisfied by Neena Ceballos LPN  ??? ? ? ?Body Mass Index Check on??09/15/21.??Satisfied by Neena Cebalols LPN  ??? ? ? ?Depression Screening on??09/15/21.??Satisfied by Neena Ceballos LPN  ??? ? ? ?Diabetes Maintenance-Serum Creatinine on??03/10/21.??Satisfied by Jame Buchanan  ??? ? ? ?Diabetes Screening on??03/10/21.??Satisfied by Jame Buchanan  ??? ? ? ?Fall Risk Assessment on??11/19/20.??Satisfied by Mari Hess LPN  ??? ? ? ?Functional Assessment on??11/19/20.??Satisfied by Mari Hess LPN  ??? ? ? ?Hypertension Management-Blood Pressure on??09/15/21.??Satisfied by Neena Ceballos LPN  ??? ? ? ?Influenza Vaccine on??09/15/21.??Satisfied by Neena Ceballos LPN  ??? ? ? ?Lipid Screening on??03/10/21.??Satisfied by Jame Buchanan  ??? ? ? ?Medicare Annual Wellness Exam  on??03/10/21.??Satisfied by TORRI Thornton Teddi  ??? ? ? ?Obesity Screening on??09/15/21.??Satisfied by Neena Ceballos LPN  ?      Patient condition discussed?in detail with nurse practitioner.? Agree with plan of care?and follow-up.

## 2022-05-02 NOTE — HISTORICAL [facility] CERNER
This is a historical note converted from Renzo. Formatting and pictures may have been removed.  Please reference Renzo for original formatting and attached multimedia. Chief Complaint  PATIENT HERE FOR RIGHT KNEE PAIN. PATIENT STATES SHE HURT HER KNEE BUT NOT SURE HOW IT HAPPENED. SHE STATES ITS BEEN ABOUT 5 WEEKS. PATIENT STATES THE PAIN IS MOSTLY ON THE MEDIAL SIDE OF THE KNEE  History of Present Illness  Knee symptoms ::knee gives way ??? Knee joint pain on the right ??? Worse with weightbearing ??? Worse in the morning  ??? Slowly worsens with extended activity ??? Is increased by bending it ??? By twisting it ??? By kneeling ??? With stairs ??? By squatting  ??? Knee joint swelling on the right??  ?? Knee joint pain not improved by rest ? ??? No clicking sensation in the right knee ??? No grating sensation in the right knee?  ??? The knee did not suddenly buckle due to contact ?? No popping sound was heard in the knee?  ??? No tingling ??? No burning sensation  Review of Systems  Review of Systems  ????Constitutional: No fever, No chills.  ????Respiratory: No shortness of breath, No cough.  ????Cardiovascular: No chest pain.  ????Gastrointestinal: No nausea, No vomiting, No diarrhea, No constipation, No heartburn.  ????Genitourinary: No dysuria, No hematuria.  ????Hematology/Lymphatics: No bleeding tendency.  ????Endocrine: No polyuria.  ????Neurologic: Alert and oriented X4, No numbness, No tingling.  ????Psychiatric: No anxiety, No depression.  Physical Exam  Vitals & Measurements  BP:?140/92?  HT:?154?cm? HT:?154?cm? WT:?78.3?kg? WT:?78.3?kg? BMI:?33.02?  PHYSICAL FINDINGS  Cardiovascular:  Arterial Pulses: ? Dorsalis pedis pulses were normal right. ? Dorsalis pedis pulses were normal right.  Musculoskeletal System:  Hips:  General/bilateral: ? No swelling of the hips. ? No induration of the hips.  Right Hip: ? Motion was normal. ? No pain was elicited by active internal rotation with the hip flexed.  ? No  pain was elicited by active external rotation with the hip flexed. ? No pain was elicited by active motion. ? No pain was elicited by passive motion.  Left Hip: ? Motion was normal.  Right Knee: ? Examined. ? Positive effusion. ?Localized swelling. ?Genu varum. ?Patella demonstrated crepitus. ?Anteromedial aspect was tender on palpation. ?Medial aspect was tender on palpation.  patella  Right Knee:  Knee Motion: Value Normal Range  Active flexion?120 degrees  Active extension? 03 degrees  ???  ? Pain was elicited throughout the range of motion. ? Swelling with a negative fluctuation test. ? No erythema. ? No warmth. ? Anterior aspect was not tender on palpation. ? Patellofemoral region was not tender on palpation. ? Medial collateral ligament was not tender on palpation. ? Lateral collateral ligament was not tender on palpation. ? No pain was elicited by motion using [place] apprehension test. ? No laxity of the posterior cruciate ligament. ? No anterior drawer sign was present. ? No one plane medial (straight) instability. ? No one plane lateral (straight) instability. ? A Lachman test did not demonstrate one plane anterior instability. ? Clarkes sign was not observed for chondromalacia.  ?  ?   ???  TESTS  Imaging:  X-Ray Knee:  AP and lateral view x-rays of the right knee with sunrise view of the patella were performed -of right knee.  ???  IMPRESSIONS RADIOLOGY TEST  Narrowing of the right knee joint space (not?bone on bone), showed sclerosis of the right knee, and osteophytes arising from the right knee.  Administered corticosteroid injection?? ?right knee ?2cc cortisone and 2cc lidocaine using sterile technique after informed verbal consent. Risks discussed with patient prior to injection. Informed the patient of the following:  -?Explained to patient that this injection in some patients will experience increased pain a few minutes after the injection and that the pain will last anywhere from 10 to 20  minutes. In order to decrease the pain you need to do the following:  o?Make sure to move the extremity in which the injection was given. If you do not move the medication sits there and can cause more pain.  o?Ice the affected joint every 2 hours for 20 minutes at a time for the next 24 hours.  o?If you are not already taking an anti-inflammatory take the following Ibuprofen/ Advil take 3 to 4 tablets every 6 to 8 hours or 2 Aleve every 12 hours for the next 5 days to help the medication work. If you cannot take anti-inflammatory take 2 extra strength Tylenol every 6 hours for the next 5 days.  ??Patient voiced understanding ?????????????????????????????????????????????????????????????????????????????  Assessment/Plan  1.?Osteoarthritis of right knee  ?  Right knee pain  Ordered:  XR Knee Right 3 Views, Routine, 05/01/18 15:41:00 CDT, Pain, None, Ambulatory, Rad Type, Right knee pain, 05/01/18 15:41:00 CDT  ?   Problem List/Past Medical History  Ongoing  Benign essential HTN  Chronic GERD  Diabetes mellitus, type II  Hypothyroidism  IC (interstitial cystitis)  Osteoarthritis of right knee  Stress incontinence, female  Historical  Sebaceous cyst  Procedure/Surgical History  Colonoscopy (03/11/2013), Esophagogastroduodenoscopy (03/04/2013), Arthroplasty of right shoulder, Bunionectomy, Cholecystectomy, Hysterectomy, Suspension of bladder.  Medications  Aspir 81 oral delayed release tablet, 81 mg= 1 tab(s), Oral, Daily  CARVEDILOL 3.125 MG TABLET, Oral, BID  esomeprazole 40 mg oral DR capsule, 40 mg= 1 cap(s), Oral, Daily, 3 refills  Flax Seed Oil oral capsule  GLYBURID-METFORMIN 1. MG, 1 tab(s), Oral, BID  LEVOTHYROXINE 50 MCG TABLET, 50 mcg= 1 tab(s), Oral  LIVALO 4 MG TABLET,? ?Not taking  meclizine 25 mg oral tablet, 25 mg= 1 tab(s), Oral, BID  METFORMIN  MG TABLET, 500 mg= 1 tab(s), Oral, BID  Vitamin B-12 1000 mcg oral tablet, 1000 mcg= 1 tab(s), Oral, Daily  Vitamin D3 2000 intl units oral tablet,  2000 IntUnit= 1 tab(s), Oral, Daily  vitamin E 400 intl units oral capsule, 400 IntUnit= 1 cap(s), Oral, Daily  Zetia 10 mg oral tablet, 10 mg= 1 tab(s), Oral, Daily  Allergies  Latex?(UNKNOWN)  niacin?(UNKNOWN)  Social History  Alcohol  Current, Beer, Wine, 1-2 times per month, 04/09/2018  Employment/School  Retired, 04/09/2018  Exercise  Exercise duration: 3. Exercise frequency: 1-2 times/week. Exercise type: Aerobics., 04/09/2018  Home/Environment  Lives with Spouse., 04/09/2018  Nutrition/Health  Diabetic, 04/09/2018  Substance Abuse  Never, 04/09/2018  Tobacco  Never smoker, 04/09/2018  Family History  Acute myocardial infarction.: Mother.  Cancer: Sister.  Diabetes mellitus type 2: Father.  Immunizations  Vaccine Date Status   influenza virus vaccine, inactivated 10/02/2017 Recorded

## 2022-09-09 PROBLEM — E11.9 TYPE 2 DIABETES MELLITUS WITHOUT COMPLICATION, WITHOUT LONG-TERM CURRENT USE OF INSULIN: Status: ACTIVE | Noted: 2022-09-09

## 2022-09-09 PROBLEM — E11.29 MICROALBUMINURIA DUE TO TYPE 2 DIABETES MELLITUS: Status: ACTIVE | Noted: 2022-09-09

## 2022-09-09 PROBLEM — R80.9 MICROALBUMINURIA DUE TO TYPE 2 DIABETES MELLITUS: Status: ACTIVE | Noted: 2022-09-09

## 2022-09-09 PROBLEM — E78.5 HYPERLIPIDEMIA: Status: ACTIVE | Noted: 2022-09-09

## 2022-09-09 PROBLEM — Z23 NEED FOR INFLUENZA VACCINATION: Status: ACTIVE | Noted: 2022-09-09

## 2022-12-10 ENCOUNTER — HOSPITAL ENCOUNTER (INPATIENT)
Facility: HOSPITAL | Age: 77
LOS: 1 days | Discharge: HOME | DRG: 303 | End: 2022-12-11
Attending: INTERNAL MEDICINE | Admitting: INTERNAL MEDICINE
Payer: MEDICARE

## 2022-12-10 DIAGNOSIS — R06.02 SOB (SHORTNESS OF BREATH): Primary | ICD-10-CM

## 2022-12-10 DIAGNOSIS — R07.9 CHEST PAIN, UNSPECIFIED TYPE: ICD-10-CM

## 2022-12-10 DIAGNOSIS — R07.9 CHEST PAIN: ICD-10-CM

## 2022-12-10 DIAGNOSIS — I25.10 CAD (CORONARY ARTERY DISEASE): ICD-10-CM

## 2022-12-10 LAB
ALBUMIN SERPL-MCNC: 4 GM/DL (ref 3.4–4.8)
ALBUMIN/GLOB SERPL: 1.5 RATIO (ref 1.1–2)
ALP SERPL-CCNC: 101 UNIT/L (ref 40–150)
ALT SERPL-CCNC: 14 UNIT/L (ref 0–55)
AST SERPL-CCNC: 14 UNIT/L (ref 5–34)
BASOPHILS # BLD AUTO: 0.03 X10(3)/MCL (ref 0–0.2)
BASOPHILS NFR BLD AUTO: 0.4 %
BILIRUBIN DIRECT+TOT PNL SERPL-MCNC: 0.4 MG/DL
BNP BLD-MCNC: <10 PG/ML
BUN SERPL-MCNC: 18.5 MG/DL (ref 9.8–20.1)
CALCIUM SERPL-MCNC: 9.6 MG/DL (ref 8.4–10.2)
CHLORIDE SERPL-SCNC: 102 MMOL/L (ref 98–107)
CO2 SERPL-SCNC: 30 MMOL/L (ref 23–31)
CREAT SERPL-MCNC: 0.85 MG/DL (ref 0.55–1.02)
EOSINOPHIL # BLD AUTO: 0.1 X10(3)/MCL (ref 0–0.9)
EOSINOPHIL NFR BLD AUTO: 1.4 %
ERYTHROCYTE [DISTWIDTH] IN BLOOD BY AUTOMATED COUNT: 15.1 % (ref 11.5–17)
GFR SERPLBLD CREATININE-BSD FMLA CKD-EPI: >60 MLS/MIN/1.73/M2
GLOBULIN SER-MCNC: 2.6 GM/DL (ref 2.4–3.5)
GLUCOSE SERPL-MCNC: 86 MG/DL (ref 82–115)
HCT VFR BLD AUTO: 37 % (ref 37–47)
HGB BLD-MCNC: 11.1 GM/DL (ref 12–16)
IMM GRANULOCYTES # BLD AUTO: 0.01 X10(3)/MCL (ref 0–0.04)
IMM GRANULOCYTES NFR BLD AUTO: 0.1 %
LYMPHOCYTES # BLD AUTO: 1.32 X10(3)/MCL (ref 0.6–4.6)
LYMPHOCYTES NFR BLD AUTO: 18.4 %
MCH RBC QN AUTO: 23.8 PG (ref 27–31)
MCHC RBC AUTO-ENTMCNC: 30 MG/DL (ref 33–36)
MCV RBC AUTO: 79.2 FL (ref 80–94)
MONOCYTES # BLD AUTO: 0.57 X10(3)/MCL (ref 0.1–1.3)
MONOCYTES NFR BLD AUTO: 8 %
NEUTROPHILS # BLD AUTO: 5.1 X10(3)/MCL (ref 2.1–9.2)
NEUTROPHILS NFR BLD AUTO: 71.7 %
NRBC BLD AUTO-RTO: 0 %
PLATELET # BLD AUTO: 331 X10(3)/MCL (ref 130–400)
PMV BLD AUTO: 10.1 FL (ref 7.4–10.4)
POTASSIUM SERPL-SCNC: 4.2 MMOL/L (ref 3.5–5.1)
PROT SERPL-MCNC: 6.6 GM/DL (ref 5.8–7.6)
RBC # BLD AUTO: 4.67 X10(6)/MCL (ref 4.2–5.4)
SODIUM SERPL-SCNC: 138 MMOL/L (ref 136–145)
TROPONIN I SERPL-MCNC: <0.01 NG/ML (ref 0–0.04)
TROPONIN I SERPL-MCNC: <0.01 NG/ML (ref 0–0.04)
WBC # SPEC AUTO: 7.2 X10(3)/MCL (ref 4.5–11.5)

## 2022-12-10 PROCEDURE — 25000003 PHARM REV CODE 250: Performed by: NURSE PRACTITIONER

## 2022-12-10 PROCEDURE — 93005 ELECTROCARDIOGRAM TRACING: CPT

## 2022-12-10 PROCEDURE — 80053 COMPREHEN METABOLIC PANEL: CPT | Performed by: NURSE PRACTITIONER

## 2022-12-10 PROCEDURE — 93010 ELECTROCARDIOGRAM REPORT: CPT | Mod: ,,, | Performed by: INTERNAL MEDICINE

## 2022-12-10 PROCEDURE — 93010 EKG 12-LEAD: ICD-10-PCS | Mod: ,,, | Performed by: INTERNAL MEDICINE

## 2022-12-10 PROCEDURE — 11000001 HC ACUTE MED/SURG PRIVATE ROOM

## 2022-12-10 PROCEDURE — 85025 COMPLETE CBC W/AUTO DIFF WBC: CPT | Performed by: NURSE PRACTITIONER

## 2022-12-10 PROCEDURE — 83880 ASSAY OF NATRIURETIC PEPTIDE: CPT | Performed by: NURSE PRACTITIONER

## 2022-12-10 PROCEDURE — 25000003 PHARM REV CODE 250: Performed by: PHYSICIAN ASSISTANT

## 2022-12-10 PROCEDURE — 99285 EMERGENCY DEPT VISIT HI MDM: CPT | Mod: 25

## 2022-12-10 PROCEDURE — 84484 ASSAY OF TROPONIN QUANT: CPT | Mod: 91 | Performed by: NURSE PRACTITIONER

## 2022-12-10 RX ORDER — ONDANSETRON 2 MG/ML
4 INJECTION INTRAMUSCULAR; INTRAVENOUS
Status: DISPENSED | OUTPATIENT
Start: 2022-12-10 | End: 2022-12-11

## 2022-12-10 RX ORDER — CARVEDILOL 3.12 MG/1
3.12 TABLET ORAL 2 TIMES DAILY
Status: DISCONTINUED | OUTPATIENT
Start: 2022-12-10 | End: 2022-12-11

## 2022-12-10 RX ORDER — ASPIRIN 325 MG
325 TABLET ORAL
Status: COMPLETED | OUTPATIENT
Start: 2022-12-10 | End: 2022-12-10

## 2022-12-10 RX ORDER — ACETAMINOPHEN 500 MG
500 TABLET ORAL
Status: COMPLETED | OUTPATIENT
Start: 2022-12-10 | End: 2022-12-10

## 2022-12-10 RX ORDER — ERGOCALCIFEROL 1.25 MG/1
50000 CAPSULE ORAL
COMMUNITY
End: 2022-12-11

## 2022-12-10 RX ADMIN — ASPIRIN 325 MG ORAL TABLET 325 MG: 325 PILL ORAL at 07:12

## 2022-12-10 RX ADMIN — ACETAMINOPHEN 500 MG: 500 TABLET ORAL at 09:12

## 2022-12-10 RX ADMIN — CARVEDILOL 3.12 MG: 3.12 TABLET, FILM COATED ORAL at 07:12

## 2022-12-10 NOTE — Clinical Note
Diagnosis: SOB (shortness of breath) [532123]   Admitting Provider:: SIRISHA DIANE [[id number]]   Future Attending Provider: SIRISHA DIANE [[id number]]   Reason for IP Medical Treatment  (Clinical interventions that can only be accomplished in the IP setting? ) :: higher level care   Estimated Length of Stay:: 2 midnights   I certify that Inpatient services for greater than or equal to 2 midnights are medically necessary:: Yes   Plans for Post-Acute care--if anticipated (pick the single best option):: A. No post acute care anticipated at this time

## 2022-12-10 NOTE — FIRST PROVIDER EVALUATION
Medical screening examination initiated.  I have conducted a focused provider triage encounter, findings are as follows:    Brief history of present illness:  77-year-old female presents to ER complaining of shortness of breath, heartburn, chest tightness since 10:00 a.m. today.  Reports took 3 sublingual nitroglycerin with mild improvement but still has the tightness across her chest.  Reports a history of cardiac stents x3 back in April of 2022.    Sees Dr. Ivan with Cardiology.    PCP is Ravi Miller.     There were no vitals filed for this visit.    Pertinent physical exam:  AAOx4    Brief workup plan:  labs and EKG, Xray    Preliminary workup initiated; this workup will be continued and followed by the physician or advanced practice provider that is assigned to the patient when roomed.

## 2022-12-11 VITALS
TEMPERATURE: 98 F | SYSTOLIC BLOOD PRESSURE: 130 MMHG | DIASTOLIC BLOOD PRESSURE: 84 MMHG | BODY MASS INDEX: 32.61 KG/M2 | RESPIRATION RATE: 18 BRPM | HEART RATE: 83 BPM | WEIGHT: 167 LBS | OXYGEN SATURATION: 96 %

## 2022-12-11 PROBLEM — R06.02 SOB (SHORTNESS OF BREATH): Status: ACTIVE | Noted: 2022-12-11

## 2022-12-11 PROBLEM — R07.9 CHEST PAIN: Status: RESOLVED | Noted: 2022-12-11 | Resolved: 2022-12-11

## 2022-12-11 PROBLEM — I25.10 CORONARY ARTERY DISEASE INVOLVING NATIVE CORONARY ARTERY OF NATIVE HEART WITHOUT ANGINA PECTORIS: Status: ACTIVE | Noted: 2022-12-11

## 2022-12-11 PROBLEM — R07.9 CHEST PAIN: Status: ACTIVE | Noted: 2022-12-11

## 2022-12-11 LAB
ALBUMIN SERPL-MCNC: 4.1 GM/DL (ref 3.4–4.8)
ALBUMIN/GLOB SERPL: 1.6 RATIO (ref 1.1–2)
ALP SERPL-CCNC: 105 UNIT/L (ref 40–150)
ALT SERPL-CCNC: 14 UNIT/L (ref 0–55)
AST SERPL-CCNC: 13 UNIT/L (ref 5–34)
BASOPHILS # BLD AUTO: 0.02 X10(3)/MCL (ref 0–0.2)
BASOPHILS NFR BLD AUTO: 0.3 %
BILIRUBIN DIRECT+TOT PNL SERPL-MCNC: 0.5 MG/DL
BUN SERPL-MCNC: 16 MG/DL (ref 9.8–20.1)
CALCIUM SERPL-MCNC: 10 MG/DL (ref 8.4–10.2)
CHLORIDE SERPL-SCNC: 102 MMOL/L (ref 98–107)
CO2 SERPL-SCNC: 27 MMOL/L (ref 23–31)
CREAT SERPL-MCNC: 0.8 MG/DL (ref 0.55–1.02)
EOSINOPHIL # BLD AUTO: 0.12 X10(3)/MCL (ref 0–0.9)
EOSINOPHIL NFR BLD AUTO: 1.6 %
ERYTHROCYTE [DISTWIDTH] IN BLOOD BY AUTOMATED COUNT: 15.1 % (ref 11.5–17)
EST. AVERAGE GLUCOSE BLD GHB EST-MCNC: 162.8 MG/DL
GFR SERPLBLD CREATININE-BSD FMLA CKD-EPI: >60 MLS/MIN/1.73/M2
GLOBULIN SER-MCNC: 2.6 GM/DL (ref 2.4–3.5)
GLUCOSE SERPL-MCNC: 95 MG/DL (ref 82–115)
HBA1C MFR BLD: 7.3 %
HCT VFR BLD AUTO: 36.9 % (ref 37–47)
HGB BLD-MCNC: 11.2 GM/DL (ref 12–16)
IMM GRANULOCYTES # BLD AUTO: 0.02 X10(3)/MCL (ref 0–0.04)
IMM GRANULOCYTES NFR BLD AUTO: 0.3 %
LYMPHOCYTES # BLD AUTO: 1.9 X10(3)/MCL (ref 0.6–4.6)
LYMPHOCYTES NFR BLD AUTO: 24.9 %
MCH RBC QN AUTO: 23.6 PG (ref 27–31)
MCHC RBC AUTO-ENTMCNC: 30.4 MG/DL (ref 33–36)
MCV RBC AUTO: 77.7 FL (ref 80–94)
MONOCYTES # BLD AUTO: 0.69 X10(3)/MCL (ref 0.1–1.3)
MONOCYTES NFR BLD AUTO: 9 %
NEUTROPHILS # BLD AUTO: 4.9 X10(3)/MCL (ref 2.1–9.2)
NEUTROPHILS NFR BLD AUTO: 63.9 %
NRBC BLD AUTO-RTO: 0 %
PLATELET # BLD AUTO: 333 X10(3)/MCL (ref 130–400)
PMV BLD AUTO: 10.2 FL (ref 7.4–10.4)
POTASSIUM SERPL-SCNC: 4.1 MMOL/L (ref 3.5–5.1)
PROT SERPL-MCNC: 6.7 GM/DL (ref 5.8–7.6)
RBC # BLD AUTO: 4.75 X10(6)/MCL (ref 4.2–5.4)
SODIUM SERPL-SCNC: 139 MMOL/L (ref 136–145)
TROPONIN I SERPL-MCNC: <0.01 NG/ML (ref 0–0.04)
TROPONIN I SERPL-MCNC: <0.01 NG/ML (ref 0–0.04)
WBC # SPEC AUTO: 7.6 X10(3)/MCL (ref 4.5–11.5)

## 2022-12-11 PROCEDURE — 83036 HEMOGLOBIN GLYCOSYLATED A1C: CPT | Performed by: INTERNAL MEDICINE

## 2022-12-11 PROCEDURE — 25000003 PHARM REV CODE 250: Performed by: INTERNAL MEDICINE

## 2022-12-11 PROCEDURE — 63600175 PHARM REV CODE 636 W HCPCS: Performed by: INTERNAL MEDICINE

## 2022-12-11 PROCEDURE — 84484 ASSAY OF TROPONIN QUANT: CPT | Mod: 91 | Performed by: INTERNAL MEDICINE

## 2022-12-11 PROCEDURE — 85025 COMPLETE CBC W/AUTO DIFF WBC: CPT | Performed by: INTERNAL MEDICINE

## 2022-12-11 PROCEDURE — 25000003 PHARM REV CODE 250: Performed by: NURSE PRACTITIONER

## 2022-12-11 PROCEDURE — 80053 COMPREHEN METABOLIC PANEL: CPT | Performed by: INTERNAL MEDICINE

## 2022-12-11 RX ORDER — RANOLAZINE 500 MG/1
500 TABLET, EXTENDED RELEASE ORAL 2 TIMES DAILY
Status: DISCONTINUED | OUTPATIENT
Start: 2022-12-11 | End: 2022-12-11 | Stop reason: HOSPADM

## 2022-12-11 RX ORDER — SODIUM CHLORIDE 0.9 % (FLUSH) 0.9 %
10 SYRINGE (ML) INJECTION
Status: DISCONTINUED | OUTPATIENT
Start: 2022-12-11 | End: 2022-12-11 | Stop reason: HOSPADM

## 2022-12-11 RX ORDER — CARVEDILOL 12.5 MG/1
12.5 TABLET ORAL 2 TIMES DAILY
Qty: 60 TABLET | Refills: 11 | Status: SHIPPED | OUTPATIENT
Start: 2022-12-11 | End: 2023-06-01 | Stop reason: SDUPTHER

## 2022-12-11 RX ORDER — RANOLAZINE 500 MG/1
500 TABLET, EXTENDED RELEASE ORAL 2 TIMES DAILY
Qty: 60 TABLET | Refills: 11 | Status: SHIPPED | OUTPATIENT
Start: 2022-12-11 | End: 2023-02-01

## 2022-12-11 RX ORDER — TALC
6 POWDER (GRAM) TOPICAL NIGHTLY PRN
Status: DISCONTINUED | OUTPATIENT
Start: 2022-12-11 | End: 2022-12-11 | Stop reason: HOSPADM

## 2022-12-11 RX ORDER — ENOXAPARIN SODIUM 100 MG/ML
1 INJECTION SUBCUTANEOUS ONCE
Status: COMPLETED | OUTPATIENT
Start: 2022-12-11 | End: 2022-12-11

## 2022-12-11 RX ORDER — ISOSORBIDE MONONITRATE 30 MG/1
30 TABLET, EXTENDED RELEASE ORAL DAILY
Status: DISCONTINUED | OUTPATIENT
Start: 2022-12-11 | End: 2022-12-11 | Stop reason: HOSPADM

## 2022-12-11 RX ORDER — PANTOPRAZOLE SODIUM 40 MG/1
40 TABLET, DELAYED RELEASE ORAL DAILY
Qty: 30 TABLET | Refills: 11 | Status: SHIPPED | OUTPATIENT
Start: 2022-12-11 | End: 2023-06-01 | Stop reason: SDUPTHER

## 2022-12-11 RX ORDER — NITROGLYCERIN 0.4 MG/1
0.4 TABLET SUBLINGUAL EVERY 5 MIN PRN
Status: DISCONTINUED | OUTPATIENT
Start: 2022-12-11 | End: 2022-12-11 | Stop reason: HOSPADM

## 2022-12-11 RX ORDER — INSULIN ASPART 100 [IU]/ML
0-5 INJECTION, SOLUTION INTRAVENOUS; SUBCUTANEOUS EVERY 6 HOURS PRN
Status: DISCONTINUED | OUTPATIENT
Start: 2022-12-11 | End: 2022-12-11 | Stop reason: HOSPADM

## 2022-12-11 RX ORDER — ISOSORBIDE MONONITRATE 30 MG/1
30 TABLET, EXTENDED RELEASE ORAL DAILY
Qty: 30 TABLET | Refills: 11 | Status: SHIPPED | OUTPATIENT
Start: 2022-12-11 | End: 2023-06-01 | Stop reason: SDUPTHER

## 2022-12-11 RX ORDER — GLUCAGON 1 MG
1 KIT INJECTION
Status: DISCONTINUED | OUTPATIENT
Start: 2022-12-11 | End: 2022-12-11 | Stop reason: HOSPADM

## 2022-12-11 RX ORDER — ASPIRIN 81 MG/1
81 TABLET ORAL DAILY
Status: DISCONTINUED | OUTPATIENT
Start: 2022-12-11 | End: 2022-12-11 | Stop reason: HOSPADM

## 2022-12-11 RX ORDER — PANTOPRAZOLE SODIUM 40 MG/1
40 TABLET, DELAYED RELEASE ORAL DAILY
Status: DISCONTINUED | OUTPATIENT
Start: 2022-12-11 | End: 2022-12-11 | Stop reason: HOSPADM

## 2022-12-11 RX ORDER — CLOPIDOGREL BISULFATE 75 MG/1
75 TABLET ORAL DAILY
Status: DISCONTINUED | OUTPATIENT
Start: 2022-12-11 | End: 2022-12-11 | Stop reason: HOSPADM

## 2022-12-11 RX ORDER — CARVEDILOL 12.5 MG/1
12.5 TABLET ORAL 2 TIMES DAILY
Status: DISCONTINUED | OUTPATIENT
Start: 2022-12-11 | End: 2022-12-11 | Stop reason: HOSPADM

## 2022-12-11 RX ORDER — CARVEDILOL 3.12 MG/1
6.25 TABLET ORAL 2 TIMES DAILY
Status: DISCONTINUED | OUTPATIENT
Start: 2022-12-11 | End: 2022-12-11

## 2022-12-11 RX ADMIN — PANTOPRAZOLE SODIUM 40 MG: 40 TABLET, DELAYED RELEASE ORAL at 10:12

## 2022-12-11 RX ADMIN — ISOSORBIDE MONONITRATE 30 MG: 30 TABLET, EXTENDED RELEASE ORAL at 10:12

## 2022-12-11 RX ADMIN — CLOPIDOGREL BISULFATE 75 MG: 75 TABLET ORAL at 10:12

## 2022-12-11 RX ADMIN — ENOXAPARIN SODIUM 80 MG: 100 INJECTION SUBCUTANEOUS at 01:12

## 2022-12-11 RX ADMIN — RANOLAZINE 500 MG: 500 TABLET, EXTENDED RELEASE ORAL at 10:12

## 2022-12-11 RX ADMIN — ASPIRIN 81 MG: 81 TABLET, COATED ORAL at 10:12

## 2022-12-11 NOTE — H&P
[facility] Hospital Medicine History & Physical Examination       Patient Name: Eve Altman  MRN: [id number]  Patient Class: IP- Inpatient   Admission Date: 12/10/2022  3:00 PM  Length of Stay: 1  Admitting Service: Hospital Medicine   Attending Physician: Elan Lynn MD   Primary Care Provider: Timi Miller MD  History source: EMR, patient and/or patient's family    CHIEF COMPLAINT   Shortness of Breath and Chest Pain (Pt presents c/o CP/SOB. Onset this am.  Took x3 nitro SL/ no resolve.  / PMH cardiac stentsx3/dr gilliam. / April 2022)    HISTORY OF PRESENT ILLNESS:   Patient is a pleasant 77-year-old female with history of CAD with recent cardiac stents April of 2022 and additional past medical history as below presents to the ER with chest pain.  Patient explains in April she was having significant dyspnea and had a left heart catheterization which showed evidence of significant CAD and she required 3 stents at that time.  She is not had any chest pain or episodes of severe dyspnea since up until tonight.  She felt retrosternal chest tightness and pressure associated with his sensation of dyspnea.  She took 3 doses of nitroglycerin which she reports did not immediately give relief however over the subsequent hours she did start to feel relief.  She does describe a history of severe reflux disease and has had an esophageal stricture that required dilation in the past.  She arrived afebrile hemodynamically stable an EKG showed no acute signs of ischemia, troponins been undetectable at 0 and 3 hours.  Hospitalist service is consulted for admission.    PAST MEDICAL HISTORY:   CAD with PTCA/stents April 2022   Type 2 diabetes mellitus  Hypertension  Hyperlipidemia  Severe GERD  Esophageal strictures requiring dilation    PAST SURGICAL HISTORY:     Past Surgical History:   Procedure Laterality Date    ARTHROPLASTY Right     shoulder    Bone and/or joint imaging; whole body    06/24/2019    BUNIONECTOMY      CHOLECYSTECTOMY      COLONOSCOPY  03/11/2013    ESOPHAGOGASTRODUODENOSCOPY  04/16/2019    ESOPHAGOGASTRODUODENOSCOPY  03/04/2013    HYSTERECTOMY      LUMBAR SPINE SURGERY  07/24/2019    Suspension of bladder         ALLERGIES:   Adhesive, Niacin, Lisinopril, and Latex    FAMILY HISTORY:   Reviewed and non-contributory     SOCIAL HISTORY:     Social History     Tobacco Use    Smoking status: Never     Passive exposure: Never    Smokeless tobacco: Never   Substance Use Topics    Alcohol use: Not Currently        HOME MEDICATIONS:     Prior to Admission medications    Medication Sig Start Date End Date Taking? Authorizing Provider   aspirin (ECOTRIN) 81 MG EC tablet Take 81 mg by mouth once daily at 6am.   Yes Historical Provider   blood sugar diagnostic Strp To check BG 1 time daily, to use with insurance preferred meter 7/12/22  Yes Timi Miller MD   blood-glucose meter kit To check BG 1 time daily, to use with insurance preferred meter 7/12/22  Yes Timi Miller MD   carvediloL (COREG) 6.25 MG tablet Take 1 tablet by mouth 2 (two) times a day. 6/13/22  Yes Historical Provider   clopidogreL (PLAVIX) 75 mg tablet Take 75 mg by mouth once daily. 6/13/22  Yes Historical Provider   cyanocobalamin (VITAMIN B-12) 1000 MCG tablet Take 1,000 mcg by mouth once daily at 6am.   Yes Historical Provider   esomeprazole (NEXIUM) 40 MG capsule Take 40 mg by mouth once daily. 2/1/22  Yes Historical Provider   glyBURIDE-metformin 1. mg (GLUCOVANCE) 1. mg per tablet Take 1 tablet by mouth once daily at 6am. 7/12/22  Yes Historical Provider   lancets Misc To check BG 1 time daily, to use with insurance preferred meter 7/12/22  Yes Timi Miller MD   metFORMIN (GLUCOPHAGE) 500 MG tablet TAKE ONE TABLET BY MOUTH TWO TIMES A DAY 9/23/22  Yes Timi Miller MD   nitroGLYCERIN (NITROSTAT) 0.4 MG SL tablet TAKE 1 TABLET (SUBLINGUAL) ONCE EVERY 5 MIN FOR 3 DOSES FOR CHEST PAIN IF  NOT RELIEVED GO TO ER. 4/5/22  Yes Historical Provider   cholecalciferol, vitamin D3, (VITAMIN D3) 50 mcg (2,000 unit) Cap capsule Take 5,000 Units by mouth once daily at 6am.    Historical Provider   ergocalciferol (VITAMIN D2) 50,000 unit Cap Take 50,000 Units by mouth every 7 days.    Historical Provider   FLAXSEED OIL-OMEGA 3,6,9 ORAL Take 1 capsule by mouth 3 (three) times daily.    Historical Provider   zinc acetate 25 mg (zinc) Cap Take 25 mg by mouth once daily. 4/6/22   Historical Provider       REVIEW OF SYSTEMS:   Except as documented, all other systems reviewed and negative     PHYSICAL EXAM:   T 98.2 °F (36.8 °C)   BP (!) 169/94   P 85   RR 18   O2 96 %  GENERAL: awake, alert, oriented and in no acute distress, non-toxic appearing   HEENT: normocephalic atraumatic   NECK: supple   LUNGS: Clear bilaterally, no wheezing or rales, no accessory muscle use   CVS: Regular rate and rhythm, normal peripheral perfusion  ABD: Soft, non-tender, non-distended, bowel sounds present  EXTREMITIES: no clubbing or cyanosis  SKIN: Warm, dry.   NEURO: alert and oriented, grossly without focal deficits   PSYCHIATRIC: Cooperative    LABS AND IMAGING:     Recent Labs     12/10/22  1545   WBC 7.2   RBC 4.67   HGB 11.1*   HCT 37.0   MCV 79.2*   MCH 23.8*   MCHC 30.0*   RDW 15.1        No results for input(s): LACTIC in the last 72 hours.  No results for input(s): INR, APTT, D-DIMER in the last 72 hours.  No results for input(s): HGBA1C, CHOL, TRIG, LDL, VLDL, HDL in the last 72 hours.   Recent Labs     12/10/22  1545      K 4.2   CHLORIDE 102   CO2 30   BUN 18.5   CREATININE 0.85   GLUCOSE 86   CALCIUM 9.6   ALBUMIN 4.0   GLOBULIN 2.6   ALKPHOS 101   ALT 14   AST 14   BILITOT 0.4     Recent Labs     12/10/22  1545 12/10/22  1642   BNP <10.0  --    TROPONINI <0.010 <0.010          X-Ray Chest PA And Lateral  Narrative: EXAMINATION:  XR CHEST PA AND LATERAL    CLINICAL HISTORY:  Chest  Pain;    TECHNIQUE:  Two-view    COMPARISON:  None available.    FINDINGS:  Cardiopericardial silhouette is within normal limits.  Right basilar atelectasis without convincing dense focal or segmental consolidation, congestion, pleural effusion or pneumothorax.  Impression: Right basilar atelectasis.    Electronically signed by: Alfonzo Vogel  Date:    12/10/2022  Time:    15:27      ASSESSMENT & PLAN:   Unstable angina versus severe reflux/GERD  CAD with PTCA/stents April 2022   Type 2 diabetes mellitus  Hypertension  Hyperlipidemia  Severe GERD  Esophageal strictures requiring dilation    - lovenox x1, asa, nitro prn but current chest pain free  - tele, trend cardiac enzymes  - consultation to Cardiology   - resume remainder of home medications as appropriate    DVT prophylaxis: full dose lovenox x1    Code status: full     If patient was admitted under observational status it is with my approval/permission.     At least 55 min was spent on this history and physical.  Time seen: 11PM  Critical care time = 35 min; Critical care diagnosis = unstable angina  Elan Lynn MD

## 2022-12-11 NOTE — ED PROVIDER NOTES
Encounter Date: 12/10/2022       History     Chief Complaint   Patient presents with    Shortness of Breath    Chest Pain     Pt presents c/o CP/SOB. Onset this am.  Took x3 nitro SL/ no resolve.  / PMH cardiac stentsx3/dr gilliam. / April 2022     77 year old female  presents with a several-day history of worsening shortness of breath particularly with exertion.  Today she experienced chest pain and discomfort as well.  This was partially relieved with nitroglycerin at home.  last April she experience a similar episode when she would required a cardiac stent.        Chest Pain  The current episode started several days ago. The chest pain is worsening. The pain is associated with breathing. The quality of the pain is described as tightness and pressure-like. The pain does not radiate. Chest pain is worsened by deep breathing and exertion. Primary symptoms include shortness of breath. Pertinent negatives for primary symptoms include no fever, no syncope, no cough and no nausea.   Pertinent negatives for associated symptoms include no weakness. She tried medication for the symptoms.   Shortness of Breath  Associated symptoms include chest pain. Pertinent negatives include no fever, no sore throat, no cough, no syncope and no rash.   Review of patient's allergies indicates:   Allergen Reactions    Adhesive Dermatitis    Niacin Shortness Of Breath     Other reaction(s): UNKNOWN  Redness and SOB    Lisinopril Other (See Comments)    Latex      Other reaction(s): UNKNOWN     Past Medical History:   Diagnosis Date    Essential (primary) hypertension     H/O heart artery stent     Type 2 diabetes mellitus without complications      Past Surgical History:   Procedure Laterality Date    ARTHROPLASTY Right     shoulder    Bone and/or joint imaging; whole body   06/24/2019    BUNIONECTOMY      CHOLECYSTECTOMY      COLONOSCOPY  03/11/2013    ESOPHAGOGASTRODUODENOSCOPY  04/16/2019    ESOPHAGOGASTRODUODENOSCOPY  03/04/2013     HYSTERECTOMY      LUMBAR SPINE SURGERY  07/24/2019    Suspension of bladder       Family History   Problem Relation Age of Onset    Heart attack Mother     Diabetes Father     Cancer Sister      Social History     Tobacco Use    Smoking status: Never     Passive exposure: Never    Smokeless tobacco: Never   Substance Use Topics    Alcohol use: Not Currently    Drug use: Not Currently     Review of Systems   Constitutional:  Negative for fever.   HENT:  Negative for sore throat.    Respiratory:  Positive for chest tightness and shortness of breath. Negative for cough.    Cardiovascular:  Positive for chest pain. Negative for syncope.   Gastrointestinal:  Negative for nausea.   Genitourinary:  Negative for dysuria.   Musculoskeletal:  Negative for back pain.   Skin:  Negative for rash.   Neurological:  Negative for weakness.   Hematological:  Does not bruise/bleed easily.     Physical Exam     Initial Vitals [12/10/22 1458]   BP Pulse Resp Temp SpO2   (!) 162/79 84 16 98.2 °F (36.8 °C) 96 %      MAP       --         Physical Exam    Vitals reviewed.  Constitutional: She appears well-developed.   Cardiovascular:  Normal rate.           Pulmonary/Chest: Breath sounds normal.   Abdominal: Abdomen is soft.     Skin: Skin is warm.   Psychiatric: Her behavior is normal. Judgment normal.       ED Course   Procedures  Labs Reviewed   CBC WITH DIFFERENTIAL - Abnormal; Notable for the following components:       Result Value    Hgb 11.1 (*)     MCV 79.2 (*)     MCH 23.8 (*)     MCHC 30.0 (*)     All other components within normal limits   TROPONIN I - Normal   B-TYPE NATRIURETIC PEPTIDE - Normal   TROPONIN I - Normal   CBC W/ AUTO DIFFERENTIAL    Narrative:     The following orders were created for panel order CBC auto differential.  Procedure                               Abnormality         Status                     ---------                               -----------         ------                     CBC with  Differential[[id number]]        Abnormal            Final result                 Please view results for these tests on the individual orders.   COMPREHENSIVE METABOLIC PANEL     EKG Readings: (Independently Interpreted)   Heart Rate: 83. Conduction: RBBB.   EKG revealed right bundle branch block without an old 1 for comparison   ECG Results              EKG 12-lead (Final result)  Result time 12/10/22 19:47:25      Final result by Interface, Lab In [facility] (12/10/22 19:47:25)                   Narrative:    Test Reason : R07.9,    Vent. Rate : 083 BPM     Atrial Rate : 083 BPM     P-R Int : 160 ms          QRS Dur : 112 ms      QT Int : 372 ms       P-R-T Axes : 071 026 032 degrees     QTc Int : 437 ms    Normal sinus rhythm  Right bundle branch block  Abnormal ECG    No previous ECGs available  Confirmed by Rui Ortega MD ([id number]) on 12/10/2022 7:47:15 PM    Referred By:             Confirmed By:Rui Ortega MD                                  Imaging Results              X-Ray Chest PA And Lateral (Final result)  Result time 12/10/22 15:27:12      Final result by Alfonzo Vogel MD (12/10/22 15:27:12)                   Impression:      Right basilar atelectasis.      Electronically signed by: Alfonzo Vogel  Date:    12/10/2022  Time:    15:27               Narrative:    EXAMINATION:  XR CHEST PA AND LATERAL    CLINICAL HISTORY:  Chest Pain;    TECHNIQUE:  Two-view    COMPARISON:  None available.    FINDINGS:  Cardiopericardial silhouette is within normal limits.  Right basilar atelectasis without convincing dense focal or segmental consolidation, congestion, pleural effusion or pneumothorax.                                       Medications   ondansetron injection 4 mg (4 mg Intravenous Not Given 12/10/22 1500)   carvediloL tablet 3.125 mg (3.125 mg Oral Given 12/10/22 1929)   aspirin tablet 325 mg (325 mg Oral Given 12/10/22 1900)   acetaminophen tablet 500 mg (500 mg Oral Given 12/10/22 2113)     Medical  Decision Making:   Initial Assessment:   Chest pain   SOB     Differential Diagnosis:   , pneumonia viral pneumonitis, anemia, electrolyte abnormalities, STEMI, non STEMI  Clinical Tests:   Lab Tests: Reviewed       <> Summary of Lab: Cardiac enzymes along with blood chemistry and CBC are unremarkable along with chest x-ray.  Radiological Study: Reviewed  Other:   I have discussed this case with another health care provider.       <> Summary of the Discussion: Have discussed this case with Dr. MONTALVO at ED.  Discussed case with Dr. Lynn/hospitalist.  Plan will be admitting patient for higher level of care  In light of the patient's age and risk factor and persistent complaint similar to what she had experienced before where she had need a stent placed a will be admitted for ACS workup           ED Course as of 12/10/22 2257   Sat Dec 10, 2022   1943 Troponin I: <0.010 [YG]   1943 BNP: <10.0 [YG]   1943 Sodium: 138 [YG]   1943 Potassium: 4.2 [YG]   1943 Glucose: 86 [YG]   1943 ALT: 14 [YG]   1943 AST: 14 [YG]   1943 HEMOGLOBIN(!): 11.1 [YG]   1943 X-Ray Chest PA And Lateral [YG]      ED Course User Index  [YG] MARGIE Nunez                 Clinical Impression:   Final diagnoses:  [R07.9] Chest pain  [R06.02] SOB (shortness of breath) (Primary)  [R07.9] Chest pain, unspecified type        ED Disposition Condition    Admit Stable                MARGIE Nunez  12/10/22 2257

## 2022-12-11 NOTE — ED PROVIDER NOTES
Encounter Date: 12/10/2022       History     Chief Complaint   Patient presents with    Shortness of Breath    Chest Pain     Pt presents c/o CP/SOB. Onset this am.  Took x3 nitro SL/ no resolve.  / PMH cardiac stentsx3/dr gilliam. / April 2022     HPI  Review of patient's allergies indicates:   Allergen Reactions    Adhesive Dermatitis    Niacin Shortness Of Breath     Other reaction(s): UNKNOWN  Redness and SOB    Lisinopril Other (See Comments)    Latex      Other reaction(s): UNKNOWN     Past Medical History:   Diagnosis Date    Essential (primary) hypertension     H/O heart artery stent     Type 2 diabetes mellitus without complications      Past Surgical History:   Procedure Laterality Date    ARTHROPLASTY Right     shoulder    Bone and/or joint imaging; whole body   06/24/2019    BUNIONECTOMY      CHOLECYSTECTOMY      COLONOSCOPY  03/11/2013    ESOPHAGOGASTRODUODENOSCOPY  04/16/2019    ESOPHAGOGASTRODUODENOSCOPY  03/04/2013    HYSTERECTOMY      LUMBAR SPINE SURGERY  07/24/2019    Suspension of bladder       Family History   Problem Relation Age of Onset    Heart attack Mother     Diabetes Father     Cancer Sister      Social History     Tobacco Use    Smoking status: Never     Passive exposure: Never    Smokeless tobacco: Never   Substance Use Topics    Alcohol use: Not Currently    Drug use: Not Currently     Review of Systems    Physical Exam     Initial Vitals [12/10/22 1458]   BP Pulse Resp Temp SpO2   (!) 162/79 84 16 98.2 °F (36.8 °C) 96 %      MAP       --         Physical Exam    ED Course   Procedures  Labs Reviewed   CBC WITH DIFFERENTIAL - Abnormal; Notable for the following components:       Result Value    Hgb 11.1 (*)     MCV 79.2 (*)     MCH 23.8 (*)     MCHC 30.0 (*)     All other components within normal limits   TROPONIN I - Normal   B-TYPE NATRIURETIC PEPTIDE - Normal   TROPONIN I - Normal   CBC W/ AUTO DIFFERENTIAL    Narrative:     The following orders were created for panel order CBC auto  differential.  Procedure                               Abnormality         Status                     ---------                               -----------         ------                     CBC with Differential[[id number]]        Abnormal            Final result                 Please view results for these tests on the individual orders.   COMPREHENSIVE METABOLIC PANEL        ECG Results              EKG 12-lead (Final result)  Result time 12/10/22 19:47:25      Final result by Interface, Lab In [facility] (12/10/22 19:47:25)                   Narrative:    Test Reason : R07.9,    Vent. Rate : 083 BPM     Atrial Rate : 083 BPM     P-R Int : 160 ms          QRS Dur : 112 ms      QT Int : 372 ms       P-R-T Axes : 071 026 032 degrees     QTc Int : 437 ms    Normal sinus rhythm  Right bundle branch block  Abnormal ECG    No previous ECGs available  Confirmed by Rui Ortega MD ([id number]) on 12/10/2022 7:47:15 PM    Referred By:             Confirmed By:Rui Ortega MD                                  Imaging Results              X-Ray Chest PA And Lateral (Final result)  Result time 12/10/22 15:27:12      Final result by Alfonzo Vogel MD (12/10/22 15:27:12)                   Impression:      Right basilar atelectasis.      Electronically signed by: Alfonzo Vogel  Date:    12/10/2022  Time:    15:27               Narrative:    EXAMINATION:  XR CHEST PA AND LATERAL    CLINICAL HISTORY:  Chest Pain;    TECHNIQUE:  Two-view    COMPARISON:  None available.    FINDINGS:  Cardiopericardial silhouette is within normal limits.  Right basilar atelectasis without convincing dense focal or segmental consolidation, congestion, pleural effusion or pneumothorax.                                       Medications   ondansetron injection 4 mg (4 mg Intravenous Not Given 12/10/22 1500)   carvediloL tablet 3.125 mg (3.125 mg Oral Given 12/10/22 1929)   aspirin tablet 325 mg (325 mg Oral Given 12/10/22 1900)   acetaminophen tablet 500 mg (500  mg Oral Given 12/10/22 2113)                Attending Attestation:     Physician Attestation Statement for NP/PA:   I have conducted a face to face encounter with this patient in addition to the NP/PA, due to Medical Complexity    Other NP/PA Attestation Additions:    History of Present Illness: Patient presents with a several-day history of worsening shortness of breath particularly with exertion.  Today she experienced chest pain and discomfort as well.  This was partially relieved with nitroglycerin at home.  Patient states that the shortness of breath are similar symptoms to what she experienced last April when she did require cardiac stent.   Physical Exam: Unremarkable physical exam.   Medical Decision Making: I performed a substantive portion of the medical decision-making.  Differential diagnosis includes pneumonia, viral pneumonitis, anemia, electrolyte abnormalities, ACS, STEMI, non-STEMI.    ECG revealed right bundle-branch block without an old 1 for comparison.    Cardiac enzymes along with blood chemistries and CBC are unremarkable along with chest x-ray.  In light of the patient's age, risk factors and presenting complaints similar to what she experienced before her stent patient will be admitted for ACS workup           ED Course as of 12/10/22 2130   Sat Dec 10, 2022   1943 Troponin I: <0.010 [YG]   1943 BNP: <10.0 [YG]   1943 Sodium: 138 [YG]   1943 Potassium: 4.2 [YG]   1943 Glucose: 86 [YG]   1943 ALT: 14 [YG]   1943 AST: 14 [YG]   1943 HEMOGLOBIN(!): 11.1 [YG]   1943 X-Ray Chest PA And Lateral [YG]      ED Course User Index  [YG] MARGIE Nunez                 Clinical Impression:   Final diagnoses:  [R07.9] Chest pain               Dale Christine MD  12/10/22 2131

## 2022-12-11 NOTE — CONSULTS
Inpatient consult to Cardiology  Consult performed by: JACKELIN Davey  Consult ordered by: Elan Lynn MD      [facility] - Emergency Dept  Cardiology  Consult Note    Patient Name: Eve Altman  MRN: [id number]  Admission Date: 12/10/2022  Hospital Length of Stay: 1 days  Code Status: Full Code   Attending Provider: Elan Lynn MD   Consulting Provider: JACKELIN Davey  Primary Care Physician: Timi Miller MD  Principal Problem:Chest pain    Patient information was obtained from patient, past medical records, and ER records.     Subjective:     Chief Complaint: Reason for Consult: CP    HPI: Ms. Altman is a 76 y/o female who is known to [facility], Dr. Ivan. The patient presented to the ER on 12.10.22 with c/o CP. The patient reported that in April of this year she had some CP and SOB/DOAN. 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 She reported resolution of these symptoms after her PCI. She was doing well until last night when she developed similar symptoms of CP that was located to her substernum and rated 8/10 on a verbal scale and described as a pressure that was associated with SOB. She reported some relief eventually with SL Nitro and she was admitted to Hospital Medicine and [facility] was consulted for CP R/O ACS.     12/11/12: CP free; Tn x 3 negative; doing well     PMH: HTN, CAD/Stents, DM II, GERD, HLD, VI/Ablation, Esophageal Stricture with Hx of Dilatation   PSH: Should Surgery, Bunionectomy, Cholecystectomy, Colonoscopy, EGD, Hysterectomy, Lumbar Spine Surgery, Bladder Suspension   Family History: Mother, L, MI, HF  Social History:     Previous Cardiac Diagnostics:   PET 3.22.22:  This is an abnormal perfusion study. Study is consistent with ischemia.   This scan is suggestive of moderate to high risk for future cardiovascular events.   Large reversible perfusion abnormality of severe intensity in the  lateral region.   Perfusion imaging is suggestive of single vessel disease. Perfusion defect is in the distribution of left circumflex artery.   The left ventricular cavity is noted to be normal on the stress studies. The stress left ventricular ejection fraction was calculated to be 60% and left ventricular global function is normal. The rest left ventricular cavity is noted to be normal. The rest left ventricular ejection fraction was calculated to be 50% and rest left ventricular global function is normal.   When compared to the resting ejection fraction (50%), the stress ejection fraction (60%) has increased.   The study quality is good.     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 OM1 appears to be Severely Diseased with 95% Stenosis.  The main Lcx gives rise to AV Groove Branch which has an 80% Stenosis of the Ositum  RCA - 30-40% Stenosis  Recommend: MVCAD - MV PCI/High Risk for CABG    ECHO 8.23.21:  The study quality is average.   The left ventricle is decreased in size. Global left ventricular systolic function is normal. The left ventricular ejection fraction is 60%. The left ventricle diastolic function is impaired (Grade I) with normal left atrial pressure. Noted left ventricular hypertrophy. It is mild to moderate.   Mild (1+) mitral regurgitation.     Carotid US 8.23.21:  The study quality is average.   1-39% stenosis in the proximal right internal carotid artery based on Bluth Criteria, plaquing is mainly within the bulb area. Antegrade  right vertebral artery flow.   There is no plaque noted in the left internal carotid artery. Antegrade left vertebral artery flow.     Review of patient's allergies indicates:   Allergen Reactions    Adhesive Dermatitis    Niacin Shortness Of Breath     Other reaction(s): UNKNOWN  Redness and SOB    Lisinopril Other (See Comments)    Latex      Other reaction(s): UNKNOWN     No current facility-administered medications on file prior to encounter.     Current Outpatient Medications on File Prior to Encounter   Medication Sig    aspirin (ECOTRIN) 81 MG EC tablet Take 81 mg by mouth once daily at 6am.    blood sugar diagnostic Strp To check BG 1 time daily, to use with insurance preferred meter    blood-glucose meter kit To check BG 1 time daily, to use with insurance preferred meter    carvediloL (COREG) 6.25 MG tablet Take 1 tablet by mouth 2 (two) times a day.    clopidogreL (PLAVIX) 75 mg tablet Take 75 mg by mouth once daily.    cyanocobalamin (VITAMIN B-12) 1000 MCG tablet Take 1,000 mcg by mouth once daily at 6am.    esomeprazole (NEXIUM) 40 MG capsule Take 40 mg by mouth once daily.    glyBURIDE-metformin 1. mg (GLUCOVANCE) 1. mg per tablet Take 1 tablet by mouth once daily at 6am.    lancets Misc To check BG 1 time daily, to use with insurance preferred meter    metFORMIN (GLUCOPHAGE) 500 MG tablet TAKE ONE TABLET BY MOUTH TWO TIMES A DAY    nitroGLYCERIN (NITROSTAT) 0.4 MG SL tablet TAKE 1 TABLET (SUBLINGUAL) ONCE EVERY 5 MIN FOR 3 DOSES FOR CHEST PAIN IF NOT RELIEVED GO TO ER.    cholecalciferol, vitamin D3, (VITAMIN D3) 50 mcg (2,000 unit) Cap capsule Take 5,000 Units by mouth once daily at 6am.    ergocalciferol (VITAMIN D2) 50,000 unit Cap Take 50,000 Units by mouth every 7 days.    FLAXSEED OIL-OMEGA 3,6,9 ORAL Take 1 capsule by mouth 3 (three) times daily.    zinc acetate 25 mg (zinc) Cap Take 25 mg by mouth once daily.     Review of Systems   Constitutional:  Positive for fatigue.    Respiratory:  Positive for shortness of breath. Negative for cough.    Cardiovascular:  Positive for chest pain. Negative for palpitations and leg swelling.   All other systems reviewed and are negative.    Objective:     Vital Signs (Most Recent):  Temp: 98.2 °F (36.8 °C) (12/10/22 1458)  Pulse: 74 (12/11/22 0158)  Resp: 17 (12/11/22 0158)  BP: 135/64 (12/11/22 0158)  SpO2: (!) 93 % (12/11/22 0158)   Vital Signs (24h Range):  Temp:  [98.2 °F (36.8 °C)] 98.2 °F (36.8 °C)  Pulse:  [74-85] 74  Resp:  [16-18] 17  SpO2:  [93 %-98 %] 93 %  BP: (135-187)/() 135/64     Weight: 75.8 kg (167 lb)  Body mass index is 32.61 kg/m².    SpO2: (!) 93 %       No intake or output data in the 24 hours ending 12/11/22 0842    Lines/Drains/Airways       Peripheral Intravenous Line  Duration                  Peripheral IV - Single Lumen 12/10/22 1924 20 G Right Antecubital <1 day                  Significant Labs:  Recent Results (from the past 72 hour(s))   Comprehensive metabolic panel    Collection Time: 12/10/22  3:45 PM   Result Value Ref Range    Sodium Level 138 136 - 145 mmol/L    Potassium Level 4.2 3.5 - 5.1 mmol/L    Chloride 102 98 - 107 mmol/L    Carbon Dioxide 30 23 - 31 mmol/L    Glucose Level 86 82 - 115 mg/dL    Blood Urea Nitrogen 18.5 9.8 - 20.1 mg/dL    Creatinine 0.85 0.55 - 1.02 mg/dL    Calcium Level Total 9.6 8.4 - 10.2 mg/dL    Protein Total 6.6 5.8 - 7.6 gm/dL    Albumin Level 4.0 3.4 - 4.8 gm/dL    Globulin 2.6 2.4 - 3.5 gm/dL    Albumin/Globulin Ratio 1.5 1.1 - 2.0 ratio    Bilirubin Total 0.4 <=1.5 mg/dL    Alkaline Phosphatase 101 40 - 150 unit/L    Alanine Aminotransferase 14 0 - 55 unit/L    Aspartate Aminotransferase 14 5 - 34 unit/L    eGFR >60 mls/min/1.73/m2   Troponin I #1    Collection Time: 12/10/22  3:45 PM   Result Value Ref Range    Troponin-I <0.010 0.000 - 0.045 ng/mL   B-Type natriuretic peptide (BNP)    Collection Time: 12/10/22  3:45 PM   Result Value Ref Range    Natriuretic Peptide  <10.0 <=100.0 pg/mL   CBC with Differential    Collection Time: 12/10/22  3:45 PM   Result Value Ref Range    WBC 7.2 4.5 - 11.5 x10(3)/mcL    RBC 4.67 4.20 - 5.40 x10(6)/mcL    Hgb 11.1 (L) 12.0 - 16.0 gm/dL    Hct 37.0 37.0 - 47.0 %    MCV 79.2 (L) 80.0 - 94.0 fL    MCH 23.8 (L) 27.0 - 31.0 pg    MCHC 30.0 (L) 33.0 - 36.0 mg/dL    RDW 15.1 11.5 - 17.0 %    Platelet 331 130 - 400 x10(3)/mcL    MPV 10.1 7.4 - 10.4 fL    Neut % 71.7 %    Lymph % 18.4 %    Mono % 8.0 %    Eos % 1.4 %    Basophil % 0.4 %    Lymph # 1.32 0.6 - 4.6 x10(3)/mcL    Neut # 5.1 2.1 - 9.2 x10(3)/mcL    Mono # 0.57 0.1 - 1.3 x10(3)/mcL    Eos # 0.10 0 - 0.9 x10(3)/mcL    Baso # 0.03 0 - 0.2 x10(3)/mcL    IG# 0.01 0 - 0.04 x10(3)/mcL    IG% 0.1 %    NRBC% 0.0 %   Troponin I #2    Collection Time: 12/10/22  4:42 PM   Result Value Ref Range    Troponin-I <0.010 0.000 - 0.045 ng/mL   Comprehensive metabolic panel    Collection Time: 12/11/22  3:05 AM   Result Value Ref Range    Sodium Level 139 136 - 145 mmol/L    Potassium Level 4.1 3.5 - 5.1 mmol/L    Chloride 102 98 - 107 mmol/L    Carbon Dioxide 27 23 - 31 mmol/L    Glucose Level 95 82 - 115 mg/dL    Blood Urea Nitrogen 16.0 9.8 - 20.1 mg/dL    Creatinine 0.80 0.55 - 1.02 mg/dL    Calcium Level Total 10.0 8.4 - 10.2 mg/dL    Protein Total 6.7 5.8 - 7.6 gm/dL    Albumin Level 4.1 3.4 - 4.8 gm/dL    Globulin 2.6 2.4 - 3.5 gm/dL    Albumin/Globulin Ratio 1.6 1.1 - 2.0 ratio    Bilirubin Total 0.5 <=1.5 mg/dL    Alkaline Phosphatase 105 40 - 150 unit/L    Alanine Aminotransferase 14 0 - 55 unit/L    Aspartate Aminotransferase 13 5 - 34 unit/L    eGFR >60 mls/min/1.73/m2   Troponin I    Collection Time: 12/11/22  3:05 AM   Result Value Ref Range    Troponin-I <0.010 0.000 - 0.045 ng/mL   CBC with Differential    Collection Time: 12/11/22  3:05 AM   Result Value Ref Range    WBC 7.6 4.5 - 11.5 x10(3)/mcL    RBC 4.75 4.20 - 5.40 x10(6)/mcL    Hgb 11.2 (L) 12.0 - 16.0 gm/dL    Hct 36.9 (L) 37.0 - 47.0  %    MCV 77.7 (L) 80.0 - 94.0 fL    MCH 23.6 (L) 27.0 - 31.0 pg    MCHC 30.4 (L) 33.0 - 36.0 mg/dL    RDW 15.1 11.5 - 17.0 %    Platelet 333 130 - 400 x10(3)/mcL    MPV 10.2 7.4 - 10.4 fL    Neut % 63.9 %    Lymph % 24.9 %    Mono % 9.0 %    Eos % 1.6 %    Basophil % 0.3 %    Lymph # 1.90 0.6 - 4.6 x10(3)/mcL    Neut # 4.9 2.1 - 9.2 x10(3)/mcL    Mono # 0.69 0.1 - 1.3 x10(3)/mcL    Eos # 0.12 0 - 0.9 x10(3)/mcL    Baso # 0.02 0 - 0.2 x10(3)/mcL    IG# 0.02 0 - 0.04 x10(3)/mcL    IG% 0.3 %    NRBC% 0.0 %   Hemoglobin A1c if not done in past 3 months    Collection Time: 12/11/22  3:05 AM   Result Value Ref Range    Hemoglobin A1c 7.3 (H) <=7.0 %    Estimated Average Glucose 162.8 mg/dL     Significant Imaging:  Imaging Results              X-Ray Chest PA And Lateral (Final result)  Result time 12/10/22 15:27:12      Final result by Alfonzo Vogel MD (12/10/22 15:27:12)                   Impression:      Right basilar atelectasis.      Electronically signed by: Alfonzo Vogel  Date:    12/10/2022  Time:    15:27               Narrative:    EXAMINATION:  XR CHEST PA AND LATERAL    CLINICAL HISTORY:  Chest Pain;    TECHNIQUE:  Two-view    COMPARISON:  None available.    FINDINGS:  Cardiopericardial silhouette is within normal limits.  Right basilar atelectasis without convincing dense focal or segmental consolidation, congestion, pleural effusion or pneumothorax.                                    EKG:    Results for orders placed or performed during the hospital encounter of 12/10/22   EKG 12-lead    Narrative    Test Reason : R07.9,    Vent. Rate : 083 BPM     Atrial Rate : 083 BPM     P-R Int : 160 ms          QRS Dur : 112 ms      QT Int : 372 ms       P-R-T Axes : 071 026 032 degrees     QTc Int : 437 ms    Normal sinus rhythm  Right bundle branch block  Abnormal ECG    No previous ECGs available  Confirmed by Rui Ortega MD ([id number]) on 12/10/2022 7:47:15 PM    Referred By:             Confirmed By:Rui Ortega MD          Telemetry: SR [id number]    Physical Exam  Constitutional:       Appearance: Normal appearance.   HENT:      Head: Normocephalic.      Mouth/Throat:      Mouth: Mucous membranes are moist.   Eyes:      Extraocular Movements: Extraocular movements intact.   Cardiovascular:      Rate and Rhythm: Normal rate and regular rhythm.      Pulses: Normal pulses.   Pulmonary:      Effort: Pulmonary effort is normal.      Breath sounds: Normal breath sounds.   Abdominal:      Palpations: Abdomen is soft.   Musculoskeletal:         General: No swelling. Normal range of motion.   Skin:     General: Skin is warm and dry.   Neurological:      General: No focal deficit present.      Mental Status: She is alert and oriented to person, place, and time.   Psychiatric:         Mood and Affect: Mood normal.         Behavior: Behavior normal.     Home Medications:   No current facility-administered medications on file prior to encounter.     Current Outpatient Medications on File Prior to Encounter   Medication Sig Dispense Refill    aspirin (ECOTRIN) 81 MG EC tablet Take 81 mg by mouth once daily at 6am.      blood sugar diagnostic Strp To check BG 1 time daily, to use with insurance preferred meter 100 each 5    blood-glucose meter kit To check BG 1 time daily, to use with insurance preferred meter 1 each 0    carvediloL (COREG) 6.25 MG tablet Take 1 tablet by mouth 2 (two) times a day.      clopidogreL (PLAVIX) 75 mg tablet Take 75 mg by mouth once daily.      cyanocobalamin (VITAMIN B-12) 1000 MCG tablet Take 1,000 mcg by mouth once daily at 6am.      esomeprazole (NEXIUM) 40 MG capsule Take 40 mg by mouth once daily.      glyBURIDE-metformin 1. mg (GLUCOVANCE) 1. mg per tablet Take 1 tablet by mouth once daily at 6am.      lancets Misc To check BG 1 time daily, to use with insurance preferred meter 100 each 5    metFORMIN (GLUCOPHAGE) 500 MG tablet TAKE ONE TABLET BY MOUTH TWO TIMES A  tablet 0    nitroGLYCERIN  (NITROSTAT) 0.4 MG SL tablet TAKE 1 TABLET (SUBLINGUAL) ONCE EVERY 5 MIN FOR 3 DOSES FOR CHEST PAIN IF NOT RELIEVED GO TO ER.      cholecalciferol, vitamin D3, (VITAMIN D3) 50 mcg (2,000 unit) Cap capsule Take 5,000 Units by mouth once daily at 6am.      ergocalciferol (VITAMIN D2) 50,000 unit Cap Take 50,000 Units by mouth every 7 days.      FLAXSEED OIL-OMEGA 3,6,9 ORAL Take 1 capsule by mouth 3 (three) times daily.      zinc acetate 25 mg (zinc) Cap Take 25 mg by mouth once daily.       Current Inpatient Medications:    Current Facility-Administered Medications:     aspirin EC tablet 81 mg, 81 mg, Oral, Daily, Elan Lynn MD    carvediloL tablet 6.25 mg, 6.25 mg, Oral, BID, Elan Lynn MD    clopidogreL tablet 75 mg, 75 mg, Oral, Daily, Elan Lynn MD    dextrose 10% bolus 125 mL, 12.5 g, Intravenous, PRN, Elan Lynn MD    dextrose 10% bolus 250 mL, 25 g, Intravenous, PRN, Elan Lynn MD    glucagon (human recombinant) injection 1 mg, 1 mg, Intramuscular, PRN, Elan Lynn MD    insulin aspart U-100 injection 0-5 Units, 0-5 Units, Subcutaneous, Q6H PRN, Elan Lynn MD    melatonin tablet 6 mg, 6 mg, Oral, Nightly PRN, Elan Lynn MD    nitroGLYCERIN SL tablet 0.4 mg, 0.4 mg, Sublingual, Q5 Min PRN, Elan Lynn MD    pantoprazole EC tablet 40 mg, 40 mg, Oral, Daily, Elan Lynn MD    sodium chloride 0.9% flush 10 mL, 10 mL, Intravenous, PRN, Elan Lynn MD    Current Outpatient Medications:     aspirin (ECOTRIN) 81 MG EC tablet, Take 81 mg by mouth once daily at 6am., Disp: , Rfl:     blood sugar diagnostic Strp, To check BG 1 time daily, to use with insurance preferred meter, Disp: 100 each, Rfl: 5    blood-glucose meter kit, To check BG 1 time daily, to use with insurance preferred meter, Disp: 1 each, Rfl: 0    carvediloL (COREG) 6.25 MG tablet, Take 1 tablet by mouth 2 (two) times a day., Disp: , Rfl:      clopidogreL (PLAVIX) 75 mg tablet, Take 75 mg by mouth once daily., Disp: , Rfl:     cyanocobalamin (VITAMIN B-12) 1000 MCG tablet, Take 1,000 mcg by mouth once daily at 6am., Disp: , Rfl:     esomeprazole (NEXIUM) 40 MG capsule, Take 40 mg by mouth once daily., Disp: , Rfl:     glyBURIDE-metformin 1. mg (GLUCOVANCE) 1. mg per tablet, Take 1 tablet by mouth once daily at 6am., Disp: , Rfl:     lancets Misc, To check BG 1 time daily, to use with insurance preferred meter, Disp: 100 each, Rfl: 5    metFORMIN (GLUCOPHAGE) 500 MG tablet, TAKE ONE TABLET BY MOUTH TWO TIMES A DAY, Disp: 180 tablet, Rfl: 0    nitroGLYCERIN (NITROSTAT) 0.4 MG SL tablet, TAKE 1 TABLET (SUBLINGUAL) ONCE EVERY 5 MIN FOR 3 DOSES FOR CHEST PAIN IF NOT RELIEVED GO TO ER., Disp: , Rfl:     cholecalciferol, vitamin D3, (VITAMIN D3) 50 mcg (2,000 unit) Cap capsule, Take 5,000 Units by mouth once daily at 6am., Disp: , Rfl:     ergocalciferol (VITAMIN D2) 50,000 unit Cap, Take 50,000 Units by mouth every 7 days., Disp: , Rfl:     FLAXSEED OIL-OMEGA 3,6,9 ORAL, Take 1 capsule by mouth 3 (three) times daily., Disp: , Rfl:     zinc acetate 25 mg (zinc) Cap, Take 25 mg by mouth once daily., Disp: , Rfl:     VTE Risk Mitigation (From admission, onward)      None          Assessment:   Angina (no evidence of ACS)   - Tn negative x 4; EKG benign  CAD/Stents  Esophageal Stricture with Hx of Dilatation  HTN Urgency - Uncontrolled Upon Presentation (Improved)  DM II    - HgA1C 7.3  HLD  VI/Ablation  No Hx of GIB    Plan:   Continue ASA, Plavix, BB, Protonix  Start Imdur 30mg PO Qday  Start Ranexa 500mg PO BID  SL Nitro PRN CP  Ok for D/C Home Today  Plan for OP Nuclear LexiPET for Diagnosis of CP and Results on F/U with Dr. Ivan  We will be available as needed       Medication List        START taking these medications      isosorbide mononitrate 30 MG 24 hr tablet  Commonly known as: IMDUR  Take 1 tablet (30 mg total) by mouth once daily.      pantoprazole 40 MG tablet  Commonly known as: PROTONIX  Take 1 tablet (40 mg total) by mouth once daily.  Replaces: esomeprazole 40 MG capsule     ranolazine 500 MG Tb12  Commonly known as: RANEXA  Take 1 tablet (500 mg total) by mouth 2 (two) times daily.            CHANGE how you take these medications      carvediloL 12.5 MG tablet  Commonly known as: COREG  Take 1 tablet (12.5 mg total) by mouth 2 (two) times a day.  What changed:   medication strength  how much to take            CONTINUE taking these medications      aspirin 81 MG EC tablet  Commonly known as: ECOTRIN     blood sugar diagnostic Strp  To check BG 1 time daily, to use with insurance preferred meter     blood-glucose meter kit  To check BG 1 time daily, to use with insurance preferred meter     cholecalciferol (vitamin D3) 50 mcg (2,000 unit) Cap capsule  Commonly known as: VITAMIN D3     clopidogreL 75 mg tablet  Commonly known as: PLAVIX     cyanocobalamin 1000 MCG tablet  Commonly known as: VITAMIN B-12     FLAXSEED OIL-OMEGA 3,6,9 ORAL     glyBURIDE-metformin 1. mg 1. mg per tablet  Commonly known as: GLUCOVANCE     lancets Misc  To check BG 1 time daily, to use with insurance preferred meter     nitroGLYCERIN 0.4 MG SL tablet  Commonly known as: NITROSTAT     zinc acetate 25 mg (zinc) Cap            STOP taking these medications      esomeprazole 40 MG capsule  Commonly known as: NEXIUM  Replaced by: pantoprazole 40 MG tablet     metFORMIN 500 MG tablet  Commonly known as: GLUCOPHAGE     VITAMIN D2 50,000 unit Cap  Generic drug: ergocalciferol               Where to Get Your Medications        These medications [facility] sent to [facility] [address] [facility] [address]      Phone: [phone]   carvediloL 12.5 MG tablet  isosorbide mononitrate 30 MG 24 hr tablet  pantoprazole 40 MG tablet  ranolazine 500 MG Tb12       Thank you for your consult.     Bert Lynn,  ANP  Cardiology  [facility] - Emergency Dept  12/11/2022 8:42 AM       Patient seen independent of the NP  Plan of care determined by me and patient and  agreeable with plan    Mj Diaz MD

## 2022-12-12 NOTE — CARE UPDATE
Patient was discharged by Cardiology with recommendations to follow up Outpatient.      I have seen the patient, and they reported to me that they were already discharged. I confirmed with the Midlevel staff and was told the same.    Patient was asymptomatic,hemodynamically stable and knows to come back to the ED if she were to develop any symptoms as explained.Patient expressed interest to go home and relates her presentation to Gastric Reflux.

## 2022-12-17 NOTE — DISCHARGE SUMMARY
[facility] Medicine Discharge Summary    Admit Date: 12/10/2022  Discharge Date and Time:12/11/22,12:35 PM  Admitting Physician:  Team  Discharging Physician: Angelique Summers MD.  Primary Care Physician: Timi Miller MD  Consults: Cardiology    Discharge Diagnoses:  Unstable angina versus severe reflux/GERD  CAD with PTCA/stents April 2022   Type 2 diabetes mellitus  Hypertension  Hyperlipidemia  Severe GERD  Esophageal strictures requiring dilation    Hospital Course:   Patient is a pleasant 77-year-old female with history of CAD with recent cardiac stents April of 2022 and additional past medical history as below presents to the ER with chest pain.  Patient explains in April she was having significant dyspnea and had a left heart catheterization which showed evidence of significant CAD and she required 3 stents at that time.  She is not had any chest pain or episodes of severe dyspnea since up until tonight.  She felt retrosternal chest tightness and pressure associated with his sensation of dyspnea.  She took 3 doses of nitroglycerin which she reports did not immediately give relief however over the subsequent hours she did start to feel relief.  She does describe a history of severe reflux disease and has had an esophageal stricture that required dilation in the past.  She arrived afebrile hemodynamically stable an EKG showed no acute signs of ischemia, troponins been undetectable at 0 and 3 hours.  Hospitalist service is consulted for admission.      Lovenox x1, asa, nitro prn but current chest pain free in ED.,resumed remainder of home medications as appropriate      Cardiology recs:  Continue ASA, Plavix, BB, Protonix  Start Imdur 30mg PO Qday  Start Ranexa 500mg PO BID  SL Nitro PRN CP  Ok for D/C Home Today  Plan for OP Nuclear LexiPET for Diagnosis of CP and Results on F/U with Dr. Ivan  We will be available as needed  Patient was discharged by Cardiology with  recommendations to follow up Outpatient.        I have seen the patient, and they reported to me that they were already discharged. I confirmed with the Midlevel staff and was told the same.Patient was asymptomatic,hemodynamically stable and knows to come back to the ED if she were to develop any symptoms as explained.Patient expressed interest to go home and relates her presentation to Gastric Reflux.        Pt was seen and examined on the day of discharge  Vitals:  VITAL SIGNS: 24 HRS MIN & MAX LAST   No data recorded 98.4 °F (36.9 °C)   No data recorded 130/84   No data recorded  83   No data recorded 18   No data recorded 96 %       Physical Exam:  General: In no acute distress, afebrile  Chest: Clear to auscultation bilaterally  Heart: RRR, +S1, S2, no appreciable murmur  Abdomen: Soft, nontender, BS +  MSK: Warm, no lower extremity edema, no clubbing or cyanosis  Neurologic: Alert and oriented x4, Cranial nerve II-XII intact, Strength 5/5 in all 4 extremities     Procedures Performed: No admission procedures for hospital encounter.     Significant Diagnostic Studies: See Full reports for all details    Recent Labs   Lab 12/10/22  1545 12/11/22  0305   WBC 7.2 7.6   RBC 4.67 4.75   HGB 11.1* 11.2*   HCT 37.0 36.9*   MCV 79.2* 77.7*   MCH 23.8* 23.6*   MCHC 30.0* 30.4*   RDW 15.1 15.1    333   MPV 10.1 10.2       Recent Labs   Lab 12/10/22  1545 12/11/22  0305    139   K 4.2 4.1   CO2 30 27   BUN 18.5 16.0   CREATININE 0.85 0.80   CALCIUM 9.6 10.0   ALBUMIN 4.0 4.1   ALKPHOS 101 105   ALT 14 14   AST 14 13   BILITOT 0.4 0.5        Microbiology Results (last 7 days)       ** No results found for the last 168 hours. **             X-Ray Chest PA And Lateral  Narrative: EXAMINATION:  XR CHEST PA AND LATERAL    CLINICAL HISTORY:  Chest Pain;    TECHNIQUE:  Two-view    COMPARISON:  None available.    FINDINGS:  Cardiopericardial silhouette is within normal limits.  Right basilar atelectasis without  convincing dense focal or segmental consolidation, congestion, pleural effusion or pneumothorax.  Impression: Right basilar atelectasis.    Electronically signed by: Alfonzo Vogel  Date:    12/10/2022  Time:    15:27         Medication List        START taking these medications      isosorbide mononitrate 30 MG 24 hr tablet  Commonly known as: IMDUR  Take 1 tablet (30 mg total) by mouth once daily.     pantoprazole 40 MG tablet  Commonly known as: PROTONIX  Take 1 tablet (40 mg total) by mouth once daily.  Replaces: esomeprazole 40 MG capsule     ranolazine 500 MG Tb12  Commonly known as: RANEXA  Take 1 tablet (500 mg total) by mouth 2 (two) times daily.            CHANGE how you take these medications      carvediloL 12.5 MG tablet  Commonly known as: COREG  Take 1 tablet (12.5 mg total) by mouth 2 (two) times a day.  What changed:   medication strength  how much to take            CONTINUE taking these medications      aspirin 81 MG EC tablet  Commonly known as: ECOTRIN     blood sugar diagnostic Strp  To check BG 1 time daily, to use with insurance preferred meter     blood-glucose meter kit  To check BG 1 time daily, to use with insurance preferred meter     cholecalciferol (vitamin D3) 50 mcg (2,000 unit) Cap capsule  Commonly known as: VITAMIN D3     clopidogreL 75 mg tablet  Commonly known as: PLAVIX     cyanocobalamin 1000 MCG tablet  Commonly known as: VITAMIN B-12     FLAXSEED OIL-OMEGA 3,6,9 ORAL     glyBURIDE-metformin 1. mg 1. mg per tablet  Commonly known as: GLUCOVANCE     lancets Misc  To check BG 1 time daily, to use with insurance preferred meter     nitroGLYCERIN 0.4 MG SL tablet  Commonly known as: NITROSTAT     zinc acetate 25 mg (zinc) Cap            STOP taking these medications      esomeprazole 40 MG capsule  Commonly known as: NEXIUM  Replaced by: pantoprazole 40 MG tablet     metFORMIN 500 MG tablet  Commonly known as: GLUCOPHAGE     VITAMIN D2 50,000 unit Cap  Generic drug:  ergocalciferol               Where to Get Your Medications        These medications were sent to [facility] - [place], [place] - [address]  [address], [place] [address]      Phone: [phone]   carvediloL 12.5 MG tablet  isosorbide mononitrate 30 MG 24 hr tablet  pantoprazole 40 MG tablet  ranolazine 500 MG Tb12          Explained in detail to the patient about the discharge plan, medications, and follow-up visits. Pt understands and agrees with the treatment plan  Discharge Disposition: Home or Self Care   Discharged Condition: stable  Diet-    Medications Per DC med rec  Activities as tolerated   Follow-up Information       Ishan Ivan MD Follow up in 1 week(s).    Specialty: Cardiology  Why: Nuclear Darcie PET and Results with Dr. Ivan in 1 week Upon D/C  Contact information:  [address] [place], [place] [address]  [place] [address] [phone]                           For further questions contact hospitalist office    Discharge time 33 minutes    For worsening symptoms, chest pain, shortness of breath, increased abdominal pain, high grade fever, stroke or stroke like symptoms, immediately go to the nearest Emergency Room or call 911 as soon as possible.      Angelique Collins M.D, on 12/17/2022. at 12:35 PM.

## 2022-12-27 ENCOUNTER — HOSPITAL ENCOUNTER (OUTPATIENT)
Dept: RADIOLOGY | Facility: CLINIC | Age: 77
Discharge: HOME | End: 2022-12-27
Attending: ORTHOPAEDIC SURGERY
Payer: MEDICARE

## 2022-12-27 ENCOUNTER — OFFICE VISIT (OUTPATIENT)
Dept: ORTHOPEDICS | Facility: CLINIC | Age: 77
End: 2022-12-27
Payer: MEDICARE

## 2022-12-27 VITALS
HEART RATE: 88 BPM | BODY MASS INDEX: 32.79 KG/M2 | SYSTOLIC BLOOD PRESSURE: 148 MMHG | WEIGHT: 167 LBS | HEIGHT: 60 IN | DIASTOLIC BLOOD PRESSURE: 76 MMHG

## 2022-12-27 DIAGNOSIS — M25.562 PAIN IN BOTH KNEES, UNSPECIFIED CHRONICITY: ICD-10-CM

## 2022-12-27 DIAGNOSIS — M25.561 PAIN IN BOTH KNEES, UNSPECIFIED CHRONICITY: ICD-10-CM

## 2022-12-27 DIAGNOSIS — M17.11 PRIMARY OSTEOARTHRITIS OF RIGHT KNEE: ICD-10-CM

## 2022-12-27 DIAGNOSIS — M25.562 ACUTE PAIN OF LEFT KNEE: Primary | ICD-10-CM

## 2022-12-27 PROCEDURE — 20610 LARGE JOINT ASPIRATION/INJECTION: R KNEE: ICD-10-PCS | Mod: RT,,, | Performed by: NURSE PRACTITIONER

## 2022-12-27 PROCEDURE — 73562 XR KNEE 3 VIEW BILATERAL: ICD-10-PCS | Mod: 50,,, | Performed by: ORTHOPAEDIC SURGERY

## 2022-12-27 PROCEDURE — 73562 X-RAY EXAM OF KNEE 3: CPT | Mod: 50,,, | Performed by: ORTHOPAEDIC SURGERY

## 2022-12-27 PROCEDURE — 20610 DRAIN/INJ JOINT/BURSA W/O US: CPT | Mod: RT,,, | Performed by: NURSE PRACTITIONER

## 2022-12-27 PROCEDURE — 99213 OFFICE O/P EST LOW 20 MIN: CPT | Mod: 25,,, | Performed by: ORTHOPAEDIC SURGERY

## 2022-12-27 PROCEDURE — 99213 PR OFFICE/OUTPT VISIT, EST, LEVL III, 20-29 MIN: ICD-10-PCS | Mod: 25,,, | Performed by: ORTHOPAEDIC SURGERY

## 2022-12-27 RX ORDER — LIDOCAINE HYDROCHLORIDE 20 MG/ML
5 INJECTION, SOLUTION EPIDURAL; INFILTRATION; INTRACAUDAL; PERINEURAL
Status: DISCONTINUED | OUTPATIENT
Start: 2022-12-27 | End: 2022-12-27 | Stop reason: HOSPADM

## 2022-12-27 RX ORDER — BETAMETHASONE SODIUM PHOSPHATE AND BETAMETHASONE ACETATE 3; 3 MG/ML; MG/ML
12 INJECTION, SUSPENSION INTRA-ARTICULAR; INTRALESIONAL; INTRAMUSCULAR; SOFT TISSUE
Status: DISCONTINUED | OUTPATIENT
Start: 2022-12-27 | End: 2022-12-27 | Stop reason: HOSPADM

## 2022-12-27 RX ADMIN — BETAMETHASONE SODIUM PHOSPHATE AND BETAMETHASONE ACETATE 12 MG: 3; 3 INJECTION, SUSPENSION INTRA-ARTICULAR; INTRALESIONAL; INTRAMUSCULAR; SOFT TISSUE at 03:12

## 2022-12-27 RX ADMIN — LIDOCAINE HYDROCHLORIDE 5 ML: 20 INJECTION, SOLUTION EPIDURAL; INFILTRATION; INTRACAUDAL; PERINEURAL at 03:12

## 2022-12-27 NOTE — PROGRESS NOTES
Past Medical History:   Diagnosis Date    Essential (primary) hypertension     H/O heart artery stent     Type 2 diabetes mellitus without complications        Past Surgical History:   Procedure Laterality Date    ARTHROPLASTY Right     shoulder    Bone and/or joint imaging; whole body   06/24/2019    BUNIONECTOMY      CHOLECYSTECTOMY      COLONOSCOPY  03/11/2013    ESOPHAGOGASTRODUODENOSCOPY  04/16/2019    ESOPHAGOGASTRODUODENOSCOPY  03/04/2013    HYSTERECTOMY      LUMBAR SPINE SURGERY  07/24/2019    Suspension of bladder         Current Outpatient Medications   Medication Sig    aspirin (ECOTRIN) 81 MG EC tablet Take 81 mg by mouth once daily at 6am.    blood sugar diagnostic Strp To check BG 1 time daily, to use with insurance preferred meter    blood-glucose meter kit To check BG 1 time daily, to use with insurance preferred meter    carvediloL (COREG) 12.5 MG tablet Take 1 tablet (12.5 mg total) by mouth 2 (two) times a day.    cholecalciferol, vitamin D3, (VITAMIN D3) 50 mcg (2,000 unit) Cap capsule Take 5,000 Units by mouth once daily at 6am.    clopidogreL (PLAVIX) 75 mg tablet Take 75 mg by mouth once daily.    cyanocobalamin (VITAMIN B-12) 1000 MCG tablet Take 1,000 mcg by mouth once daily at 6am.    FLAXSEED OIL-OMEGA 3,6,9 ORAL Take 1 capsule by mouth 3 (three) times daily.    glyBURIDE-metformin 1. mg (GLUCOVANCE) 1. mg per tablet Take 1 tablet by mouth once daily at 6am.    isosorbide mononitrate (IMDUR) 30 MG 24 hr tablet Take 1 tablet (30 mg total) by mouth once daily.    lancets Misc To check BG 1 time daily, to use with insurance preferred meter    nitroGLYCERIN (NITROSTAT) 0.4 MG SL tablet TAKE 1 TABLET (SUBLINGUAL) ONCE EVERY 5 MIN FOR 3 DOSES FOR CHEST PAIN IF NOT RELIEVED GO TO ER.    pantoprazole (PROTONIX) 40 MG tablet Take 1 tablet (40 mg total) by mouth once daily.    ranolazine (RANEXA) 500 MG Tb12 Take 1 tablet (500 mg total) by mouth 2 (two) times daily.    TRUEPLUS LANCETS  28 gauge Misc Apply 1 lancet topically Daily.    zinc acetate 25 mg (zinc) Cap Take 25 mg by mouth once daily.     No current facility-administered medications for this visit.       Review of patient's allergies indicates:   Allergen Reactions    Adhesive Dermatitis    Niacin Shortness Of Breath     Other reaction(s): UNKNOWN  Redness and SOB    Lisinopril Other (See Comments)    Latex      Other reaction(s): UNKNOWN       Family History   Problem Relation Age of Onset    Heart attack Mother     Diabetes Father     Cancer Sister        Social History     Socioeconomic History    Marital status:    Tobacco Use    Smoking status: Never     Passive exposure: Never    Smokeless tobacco: Never   Substance and Sexual Activity    Alcohol use: Not Currently    Drug use: Not Currently       Chief Complaint:   Chief Complaint   Patient presents with    Right Knee - Pain     Right knee pain. Pt fell 11/23/22 and knees have been hurting since. Right knee is worse, wearing brace. Pain is on inside of knee and on kneecap and burns. Left knee was bruised but is better know.        History of present illness: Eve Altman is a 77 y.o. female, presents to the clinic today in regards to bilateral knee pain.  Patient does state that the right knee is significantly worse than the left.  States that she had a fall back in November which she had bruising to bilateral knees.  Left knee has significantly improved although she continues to have burning pain to the right knee.  Worse in the medial as.  Does have tenderness to palpation along the medial joint line.  Unable to take anti-inflammatories due to on Plavix for 3 cardiac stents placed in April.  Has tried multiple injections in the past with minimal relief.  States her last cortisone injection has been quite a while ago.      Review of Systems:    Denies fevers, chills, chest pain, shortness of breath. Comprehensive review of systems performed and otherwise negative except  as noted in HPI     Physical Examination:    General: awake and alert, no acute distress, healthy appearing  Head and Neck: Head atraumatic/normocephalic. Moist MM  CV: brisk cap refill  Lungs: non-labored breathing, w/o cough or SOB  Skin: no rashes present, warm to touch  Neuro: sensation grossly intact distally       Vital Signs:    Vitals:    12/27/22 1458   BP: (!) 148/76   Pulse: 88       Body mass index is 32.61 kg/m².    Focused Orthopedic Exam:    bilateral knee without wound or skin breakdown.  +1 joint effusion to the right knee   tenderness to palpation about the medial joint line of the right knee  ROM from 0 extension to 120 flexion  stable to varus/valgus.  stable to anterior/posterior drawer  + McMurrays  patellofemoral crepitus to ROM of the right knee      X-rays: 3 views of bilateral knees reviewed.  Mild narrowing noted throughout tricompartments of bilateral knees with small osteophytes noted throughout.  Noting mild arthritic changes.     Assessment::Primary osteoarthritis bilateral knees    Plan:  Patient presents to clinic today with complaints of bilateral knee pain.  The right is significantly worse than the left.  At this time the left has improved.  We discussed different options in pain treatment for the right.  We will give her a cortisone injection today here in clinic to help calm the symptoms down.  She has not had a cortisone injection in quite a while.  Also have her follow up in clinic in about 6 weeks to reassess her knee pain.  At this time we could discuss different options for injections such as viscosupplementation.  Patient states understanding and agrees with plan of care.    The above findings, diagnostics, and treatment plan were discussed with Dr. Yung Allen who is in agreement with the plan of care.      This note was created using [employer] voice recognition software that occasionally misinterpreted phrases or words.    Consult note is delivered via Epic messaging  service.

## 2022-12-27 NOTE — PROCEDURES
Large Joint Aspiration/Injection: R knee    Date/Time: 12/27/2022 3:15 PM  Performed by: TORRI Mack  Authorized by: Yung Allen MD     Consent Done?:  Yes (Verbal)  Indications:  Arthritis and pain  Timeout: prior to procedure the correct patient, procedure, and site was verified    Prep: patient was prepped and draped in usual sterile fashion    Local anesthesia used?: No      Details:  Needle Size:  22 G  Ultrasonic Guidance for needle placement?: No    Approach:  Anterolateral  Location:  Knee  Site:  R knee  Medications:  5 mL LIDOcaine (PF) 20 mg/mL (2%) 20 mg/mL (2 %); 12 mg betamethasone acetate-betamethasone sodium phosphate 6 mg/mL  Patient tolerance:  Patient tolerated the procedure well with no immediate complications

## 2022-12-28 LAB
LEFT EYE DM RETINOPATHY: NEGATIVE
RIGHT EYE DM RETINOPATHY: NEGATIVE

## 2023-01-26 ENCOUNTER — TELEPHONE (OUTPATIENT)
Dept: ADMINISTRATIVE | Facility: HOSPITAL | Age: 78
End: 2023-01-26
Payer: MEDICARE

## 2023-01-26 NOTE — TELEPHONE ENCOUNTER
----- Message from Celia Zimmerman MA sent at 1/24/2023  6:49 AM CST -----  Regarding: Dr TAMI CLOUD Wednesday 2-1-23       1. Are there any outstanding Labs, imaging or referrals in the patient's chart?      New Patient     No labs required    Please bring a list of Medications (mg and directions), Medical History, Surgical History, Family History, Allergy list, Immunizations record, Names of all past doctors we may need to request records from.             2. . Has the patient been seen in an ER, urgent care clinic, or any other health care    provider since their last visit? If yes when and where?

## 2023-02-01 ENCOUNTER — OFFICE VISIT (OUTPATIENT)
Dept: INTERNAL MEDICINE | Facility: CLINIC | Age: 78
End: 2023-02-01
Payer: MEDICARE

## 2023-02-01 VITALS
WEIGHT: 167 LBS | SYSTOLIC BLOOD PRESSURE: 138 MMHG | BODY MASS INDEX: 32.79 KG/M2 | HEART RATE: 94 BPM | OXYGEN SATURATION: 95 % | TEMPERATURE: 98 F | DIASTOLIC BLOOD PRESSURE: 78 MMHG | HEIGHT: 60 IN

## 2023-02-01 DIAGNOSIS — I10 ESSENTIAL (PRIMARY) HYPERTENSION: ICD-10-CM

## 2023-02-01 DIAGNOSIS — I10 HYPERTENSION, UNSPECIFIED TYPE: ICD-10-CM

## 2023-02-01 DIAGNOSIS — K21.9 CHRONIC GERD: ICD-10-CM

## 2023-02-01 DIAGNOSIS — E78.2 MIXED HYPERLIPIDEMIA: ICD-10-CM

## 2023-02-01 DIAGNOSIS — Z79.4 TYPE 2 DIABETES MELLITUS WITHOUT COMPLICATION, WITH LONG-TERM CURRENT USE OF INSULIN: Primary | ICD-10-CM

## 2023-02-01 DIAGNOSIS — E11.9 TYPE 2 DIABETES MELLITUS WITHOUT COMPLICATION, WITH LONG-TERM CURRENT USE OF INSULIN: Primary | ICD-10-CM

## 2023-02-01 DIAGNOSIS — I25.10 CORONARY ARTERY DISEASE INVOLVING NATIVE CORONARY ARTERY OF NATIVE HEART WITHOUT ANGINA PECTORIS: Primary | ICD-10-CM

## 2023-02-01 PROBLEM — E78.5 HYPERLIPIDEMIA: Chronic | Status: ACTIVE | Noted: 2022-09-09

## 2023-02-01 PROCEDURE — 99204 PR OFFICE/OUTPT VISIT, NEW, LEVL IV, 45-59 MIN: ICD-10-PCS | Mod: ,,, | Performed by: INTERNAL MEDICINE

## 2023-02-01 PROCEDURE — 99204 OFFICE O/P NEW MOD 45 MIN: CPT | Mod: ,,, | Performed by: INTERNAL MEDICINE

## 2023-02-01 RX ORDER — AMPICILLIN TRIHYDRATE 250 MG
1 CAPSULE ORAL DAILY
COMMUNITY
End: 2024-02-08

## 2023-02-01 NOTE — ASSESSMENT & PLAN NOTE
Well controlled.   Continue  Coreg 12.5 mg po bid   Low Sodium Diet (DASH Diet - Less than 2 grams of sodium per day).  Monitor blood pressure daily and log. Report consistent numbers greater than 140/90.  Maintain healthy weight with goal BMI <30. Exercise 30 minutes per day, 5 days per week.  Smoking cessation encouraged to aid in BP reduction.

## 2023-02-01 NOTE — PROGRESS NOTES
Subjective:      Patient ID: Eve Altman is a 77 y.o. female.    Chief Complaint: Establish Care    Ms. Prado is a 77-year-old female who is here today to establish care.  Her medical comorbidities are significant for hypertension, hyperlipidemia, CAD status post PTCA to LAD and obtuse marginal, diabetes mellitus type 2, interstitial cystitis.    Currently doing well with no acute needs or complaints as such except for some shortness of breath upon exertion.      Social history:  Negative   Family history:  Sister with esophageal cancer questionable, brother with pulmonary fibrosis   Surgical history: Noted in detail    Cardiology: Dr Ivan  GI: Dr. Kang    DEXA:  2022, request records  MM2022  CRS:  2013  Pneumonia: Up-to-date  Diabetic eye exam: Dr. Cherry    The patient's Health Maintenance was reviewed and the following appears to be due at this time:   Health Maintenance Due   Topic Date Due    Diabetes Urine Screening  09/15/2022    Eye Exam  2022        Past Medical History:  Past Medical History:   Diagnosis Date    Chronic GERD     Essential (primary) hypertension     H/O heart artery stent     HLD (hyperlipidemia)     Hypothyroidism, unspecified     IC (interstitial cystitis)     Osteoarthritis of right knee     Osteopenia     Stress incontinence (female) (male)     Type 2 diabetes mellitus without complications      Past Surgical History:   Procedure Laterality Date    ARTHROPLASTY Right     shoulder    Bone and/or joint imaging; whole body   2019    broken shoulder bone      BUNIONECTOMY      CATARACT EXTRACTION      CHOLECYSTECTOMY      COLONOSCOPY  2013    ESOPHAGEAL DILATION      ESOPHAGOGASTRODUODENOSCOPY  2019    ESOPHAGOGASTRODUODENOSCOPY  2013    HYSTERECTOMY      LUMBAR SPINE SURGERY  2019    STENT, AORTA      Suspension of bladder       Review of patient's allergies indicates:   Allergen Reactions    Adhesive Dermatitis    Niacin Shortness Of  Breath     Other reaction(s): UNKNOWN  Redness and SOB    Lisinopril Other (See Comments)    Latex      Other reaction(s): UNKNOWN     Social History     Socioeconomic History    Marital status:    Tobacco Use    Smoking status: Never     Passive exposure: Never    Smokeless tobacco: Never   Substance and Sexual Activity    Alcohol use: Not Currently    Drug use: Not Currently     Family History   Problem Relation Age of Onset    Heart attack Mother     Diabetes Father     Cancer Sister     Idiopathic pulmonary fibrosis Brother        Review of Systems    A comprehensive review of systems was performed and is negative except for that stated above  Objective:   /78 (BP Location: Left arm, Patient Position: Sitting, BP Method: Small (Manual))   Pulse 94   Temp 98 °F (36.7 °C) (Temporal)   Ht 5' (1.524 m)   Wt 75.8 kg (167 lb)   SpO2 95%   BMI 32.61 kg/m²     Physical Exam  Constitutional:       Appearance: Normal appearance.   HENT:      Head: Normocephalic and atraumatic.      Nose: Nose normal.      Mouth/Throat:      Mouth: Mucous membranes are moist.      Pharynx: Oropharynx is clear.   Eyes:      Extraocular Movements: Extraocular movements intact.      Pupils: Pupils are equal, round, and reactive to light.   Cardiovascular:      Rate and Rhythm: Normal rate and regular rhythm.      Pulses: Normal pulses.   Pulmonary:      Effort: Pulmonary effort is normal.      Breath sounds: Normal breath sounds.   Abdominal:      General: Bowel sounds are normal.      Palpations: Abdomen is soft.   Musculoskeletal:         General: Normal range of motion.      Cervical back: Normal range of motion and neck supple.   Skin:     General: Skin is warm.   Neurological:      General: No focal deficit present.      Mental Status: She is alert and oriented to person, place, and time. Mental status is at baseline.   Psychiatric:         Mood and Affect: Mood normal.     Assessment/ Plan:   1. Coronary artery  disease involving native coronary artery of native heart without angina pectoris  Assessment & Plan:  -status post PTCA with YUDY   -medically optimized with aspirin, statin, Plavix, beta-blocker  -also on isosorbide mononitrate   -followed by Dr Ivan      2. Mixed hyperlipidemia  Assessment & Plan:  -patient currently taking red yeast rice extract, discuss importance of statins      3. Essential (primary) hypertension  Assessment & Plan:  Well controlled.   Continue  Coreg 12.5 mg po bid   Low Sodium Diet (DASH Diet - Less than 2 grams of sodium per day).  Monitor blood pressure daily and log. Report consistent numbers greater than 140/90.  Maintain healthy weight with goal BMI <30. Exercise 30 minutes per day, 5 days per week.  Smoking cessation encouraged to aid in BP reduction.            4. Chronic GERD  Assessment & Plan:  -patient advised to avoid foods that trigger acid reflux and he had at least 2 hours before bedtime.  -on Protonix, continue

## 2023-02-01 NOTE — LETTER
AUTHORIZATION FOR RELEASE OF   CONFIDENTIAL INFORMATION    Dear Dr. Cherry    We are seeing Eve Altman, date of birth 1945, in the clinic at [place] [address] [facility]. Charity Jerome MD is the patient's PCP. Eve Altman has an outstanding lab/procedure at the time we reviewed her chart. In order to help keep her health information updated, she has authorized us to request the following medical record(s):        (  )  MAMMOGRAM                                      (  )  COLONOSCOPY      (  )  PAP SMEAR                                          (  )  OUTSIDE LAB RESULTS     (  )  DEXA SCAN                                          (X)  DIABETIC EYE EXAM            (  )  FOOT EXAM                                          (  )  ENTIRE RECORD     (  )  OUTSIDE IMMUNIZATIONS                 (  )  _______________         Please fax records to [facility], Reshma Arun Bhanushali, MD, [phone]              Patient Name: Eve Altman  : 1945  Patient Phone #: [phone]

## 2023-02-01 NOTE — ASSESSMENT & PLAN NOTE
-patient advised to avoid foods that trigger acid reflux and he had at least 2 hours before bedtime.  -on Protonix, continue

## 2023-02-01 NOTE — LETTER
AUTHORIZATION FOR RELEASE OF   CONFIDENTIAL INFORMATION    Dear Dr. Kang    We are seeing Eve Altman, date of birth 1945, in the clinic at [place] [address] [facility]. Charity Jerome MD is the patient's PCP. Eve Altman has an outstanding lab/procedure at the time we reviewed her chart. In order to help keep her health information updated, she has authorized us to request the following medical record(s):        (  )  MAMMOGRAM                                      (X)  COLONOSCOPY      (  )  PAP SMEAR                                          (  )  OUTSIDE LAB RESULTS     (  )  DEXA SCAN                                          (  )  EYE EXAM            (  )  FOOT EXAM                                          (  )  ENTIRE RECORD     (  )  OUTSIDE IMMUNIZATIONS                 (  )  _______________         Please fax records to [facility], Reshma Arun Bhanushali, MD, [phone]               Patient Name: Eve Altman  : 1945  Patient Phone #: [phone]

## 2023-02-01 NOTE — ASSESSMENT & PLAN NOTE
-status post PTCA with YUDY   -medically optimized with aspirin, statin, Plavix, beta-blocker  -also on isosorbide mononitrate   -followed by Dr Ivan

## 2023-02-06 ENCOUNTER — LAB VISIT (OUTPATIENT)
Dept: LAB | Facility: HOSPITAL | Age: 78
End: 2023-02-06
Attending: INTERNAL MEDICINE
Payer: MEDICARE

## 2023-02-06 ENCOUNTER — TELEPHONE (OUTPATIENT)
Dept: INTERNAL MEDICINE | Facility: CLINIC | Age: 78
End: 2023-02-06
Payer: MEDICARE

## 2023-02-06 DIAGNOSIS — Z79.4 TYPE 2 DIABETES MELLITUS WITHOUT COMPLICATION, WITH LONG-TERM CURRENT USE OF INSULIN: ICD-10-CM

## 2023-02-06 DIAGNOSIS — E11.9 TYPE 2 DIABETES MELLITUS WITHOUT COMPLICATION, WITH LONG-TERM CURRENT USE OF INSULIN: ICD-10-CM

## 2023-02-06 DIAGNOSIS — I10 HYPERTENSION, UNSPECIFIED TYPE: ICD-10-CM

## 2023-02-06 LAB
ALBUMIN SERPL-MCNC: 4 G/DL (ref 3.4–4.8)
ALBUMIN/GLOB SERPL: 1.3 RATIO (ref 1.1–2)
ALP SERPL-CCNC: 122 UNIT/L (ref 40–150)
ALT SERPL-CCNC: 12 UNIT/L (ref 0–55)
AST SERPL-CCNC: 10 UNIT/L (ref 5–34)
BILIRUBIN DIRECT+TOT PNL SERPL-MCNC: 0.6 MG/DL
BUN SERPL-MCNC: 17.1 MG/DL (ref 9.8–20.1)
CALCIUM SERPL-MCNC: 9.4 MG/DL (ref 8.4–10.2)
CHLORIDE SERPL-SCNC: 103 MMOL/L (ref 98–107)
CO2 SERPL-SCNC: 27 MMOL/L (ref 23–31)
CREAT SERPL-MCNC: 1.05 MG/DL (ref 0.55–1.02)
EST. AVERAGE GLUCOSE BLD GHB EST-MCNC: 171.4 MG/DL
GFR SERPLBLD CREATININE-BSD FMLA CKD-EPI: 54 MLS/MIN/1.73/M2
GLOBULIN SER-MCNC: 3.1 GM/DL (ref 2.4–3.5)
GLUCOSE SERPL-MCNC: 174 MG/DL (ref 82–115)
HBA1C MFR BLD: 7.6 %
POTASSIUM SERPL-SCNC: 4 MMOL/L (ref 3.5–5.1)
PROT SERPL-MCNC: 7.1 GM/DL (ref 5.8–7.6)
SODIUM SERPL-SCNC: 140 MMOL/L (ref 136–145)

## 2023-02-06 PROCEDURE — 80053 COMPREHEN METABOLIC PANEL: CPT

## 2023-02-06 PROCEDURE — 83036 HEMOGLOBIN GLYCOSYLATED A1C: CPT

## 2023-02-06 PROCEDURE — 36415 COLL VENOUS BLD VENIPUNCTURE: CPT

## 2023-02-06 RX ORDER — GLYBURIDE-METFORMIN HYDROCHLORIDE 2.5; 5 MG/1; MG/1
1 TABLET ORAL 2 TIMES DAILY WITH MEALS
Qty: 180 TABLET | Refills: 3 | Status: SHIPPED | OUTPATIENT
Start: 2023-02-06 | End: 2023-12-06

## 2023-02-06 NOTE — TELEPHONE ENCOUNTER
Please notify patient that I have reviewed her labs.  Her hemoglobin A1c is elevated at 7.6 on the dose of her glyburide metformin combination to 2.5-500 milligrams p.o. b.i.d..  A new prescription has already been sent to her pharmacy.  Please encourage her to watch her carbohydrates and sugary food intake, thank you    Medications Ordered This Encounter   Medications    glyBURIDE-metformin (GLUCOVANCE) 2.5-500 mg per tablet     Sig: Take 1 tablet by mouth 2 (two) times daily with meals.     Dispense:  180 tablet     Refill:  3

## 2023-02-16 ENCOUNTER — OFFICE VISIT (OUTPATIENT)
Dept: ORTHOPEDICS | Facility: CLINIC | Age: 78
End: 2023-02-16
Payer: MEDICARE

## 2023-02-16 VITALS
BODY MASS INDEX: 33.96 KG/M2 | WEIGHT: 173 LBS | SYSTOLIC BLOOD PRESSURE: 152 MMHG | DIASTOLIC BLOOD PRESSURE: 77 MMHG | HEART RATE: 87 BPM | HEIGHT: 60 IN

## 2023-02-16 DIAGNOSIS — M17.11 PRIMARY OSTEOARTHRITIS OF RIGHT KNEE: Primary | ICD-10-CM

## 2023-02-16 PROCEDURE — 99214 PR OFFICE/OUTPT VISIT, EST, LEVL IV, 30-39 MIN: ICD-10-PCS | Mod: ,,, | Performed by: ORTHOPAEDIC SURGERY

## 2023-02-16 PROCEDURE — 99214 OFFICE O/P EST MOD 30 MIN: CPT | Mod: ,,, | Performed by: ORTHOPAEDIC SURGERY

## 2023-02-16 NOTE — PROGRESS NOTES
Chief Complaint:   Chief Complaint   Patient presents with    Knee Pain     Pt states she is still experiencing some pain in her knee. The injection didnt help much. Pt describes her pain as sharp when she is walking and when she is sitting is constant dull.        History of present illness:  69-year-old female presents for evaluation follow-up of right knee pain.  Patient has significant severe pain in the right knee.  She had an injection of cortisone at last visit.  She says only helped very minimally.  Was not long-lasting.  She continues have pain to the anterior aspect of the knee mainly.  Also with pain medially and laterally.  Worse with activity.  Improved by rest.    Past Medical History:   Diagnosis Date    Chronic GERD     Essential (primary) hypertension     H/O heart artery stent     HLD (hyperlipidemia)     Hypothyroidism, unspecified     IC (interstitial cystitis)     Osteoarthritis of right knee     Osteopenia     Stress incontinence (female) (male)     Type 2 diabetes mellitus without complications        Past Surgical History:   Procedure Laterality Date    ARTHROPLASTY Right     shoulder    Bone and/or joint imaging; whole body   06/24/2019    broken shoulder bone      BUNIONECTOMY      CATARACT EXTRACTION      CHOLECYSTECTOMY      COLONOSCOPY  03/11/2013    ESOPHAGEAL DILATION      ESOPHAGOGASTRODUODENOSCOPY  04/16/2019    ESOPHAGOGASTRODUODENOSCOPY  03/04/2013    HYSTERECTOMY      LUMBAR SPINE SURGERY  07/24/2019    STENT, AORTA      Suspension of bladder         Current Outpatient Medications   Medication Sig    aspirin (ECOTRIN) 81 MG EC tablet Take 81 mg by mouth once daily at 6am.    blood sugar diagnostic Strp To check BG 1 time daily, to use with insurance preferred meter    blood-glucose meter kit To check BG 1 time daily, to use with insurance preferred meter    carvediloL (COREG) 12.5 MG tablet Take 1 tablet (12.5 mg total) by mouth 2 (two) times a day. (Patient taking differently:  Take 12.5 mg by mouth Daily.)    clopidogreL (PLAVIX) 75 mg tablet Take 75 mg by mouth once daily.    FLAXSEED OIL-OMEGA 3,6,9 ORAL Take 1 capsule by mouth 3 (three) times daily.    glyBURIDE-metformin (GLUCOVANCE) 2.5-500 mg per tablet Take 1 tablet by mouth 2 (two) times daily with meals.    isosorbide mononitrate (IMDUR) 30 MG 24 hr tablet Take 1 tablet (30 mg total) by mouth once daily.    lancets Misc To check BG 1 time daily, to use with insurance preferred meter    nitroGLYCERIN (NITROSTAT) 0.4 MG SL tablet TAKE 1 TABLET (SUBLINGUAL) ONCE EVERY 5 MIN FOR 3 DOSES FOR CHEST PAIN IF NOT RELIEVED GO TO ER.    pantoprazole (PROTONIX) 40 MG tablet Take 1 tablet (40 mg total) by mouth once daily.    red yeast rice 600 mg Cap Take 1 tablet by mouth Daily.    TRUEPLUS LANCETS 28 gauge Misc Apply 1 lancet topically Daily.    zinc acetate 25 mg (zinc) Cap Take 25 mg by mouth once daily.     No current facility-administered medications for this visit.       Review of patient's allergies indicates:   Allergen Reactions    Adhesive Dermatitis    Niacin Shortness Of Breath     Other reaction(s): UNKNOWN  Redness and SOB    Lisinopril Other (See Comments)    Latex      Other reaction(s): UNKNOWN       Family History   Problem Relation Age of Onset    Heart attack Mother     Diabetes Father     Cancer Sister     Idiopathic pulmonary fibrosis Brother        Social History     Socioeconomic History    Marital status:    Tobacco Use    Smoking status: Never     Passive exposure: Never    Smokeless tobacco: Never   Substance and Sexual Activity    Alcohol use: Not Currently    Drug use: Not Currently    Sexual activity: Not Currently     Partners: Male           Review of Systems:    Constitution: Negative for chills, fever, and sweats.  Negative for unexplained weight loss.    HENT:  Negative for headaches and blurry vision.    Cardiovascular:Negative for chest pain or irregular heart beat. Negative for  hypertension.    Respiratory:  Negative for cough and shortness of breath.    Gastrointestinal: Negative for abdominal pain, heartburn, melena, nausea, and vomitting.    Genitourinary:  Negative bladder incontinence and dysuria.    Musculoskeletal:  See HPI    Neurological: Negative for numbness.    Psychiatric/Behavioral: Negative for depression.  The patient is not nervous/anxious.      Endocrine: Negative for polyuria    Hematologic/Lymphatic: Negative for bleeding problem.  Does not bruise/bleed easily.    Skin: Negative for poor would healing and rash      Physical Examination:    Vital Signs:    Vitals:    02/16/23 1406   BP: (!) 152/77   Pulse: 87       Body mass index is 33.79 kg/m².    General: No acute distress, alert and oriented, healthy appearing    HEENT: Head is atraumatic, mucous membranes are moist    Neck: Supples, no JVD    Cardiovascular: Palpable dorsalis pedis and posterior tibial pulses, regular rate and rhythm to those pulses    Lungs: Breathing non-labored    Skin: no rashes appreciated    Neurologic: Can flex and extend knees, ankles, and toes. Sensation is grossly intact    Right knee:  Patient is having crepitus range of motion right knee.  She is brisk cap refill distally.  Sensation intact distally.  patient gets full extension.  Flexion of 110°.    X-rays:      Assessment::  Right knee osteoarthritis    Plan:  Discussed all treatment options the patient.  She is tried and failed all conservative measures.  We discussed further injections.  These have failed to relieve her symptoms in the past.  Patient continues to have significant severe pain in the right knee.  She feels that reached a point disability like to discuss further treatment options.  Risks, benefits, alternatives to total knee arthroplasty discussed in detail.  All questions answered to the patient's satisfaction.  No guarantees made.  Patient has a history of stents placed in mid April.  We will need to wait for these to  mature.  We will go ahead and send a cardiac risk stratification to her cardiologist as well.    This note was created using [employer] voice recognition software that occasionally misinterpreted phrases or words.    Consult note is delivered via Epic messaging service.

## 2023-02-17 ENCOUNTER — PATIENT OUTREACH (OUTPATIENT)
Dept: ADMINISTRATIVE | Facility: HOSPITAL | Age: 78
End: 2023-02-17
Payer: MEDICARE

## 2023-02-17 NOTE — PROGRESS NOTES
The following record(s)  below were uploaded for Health Maintenance .    DM EYE EXAM 12/28/22      Population Health Outreach.

## 2023-03-06 NOTE — PROGRESS NOTES
This patient has  Increased pain with activity Initiation  Interference with normal activities of daily living due to pain  Increased pain with weight bearing activities  Pain with range of motion    Limited ROM  Crepitus  Joint effusion or swelling   Bone-on-bone contact by imaging  No active infection  Joint replacement Planned    This patient has an active or unstable comorbidity that significantly increases the mortality risk and require more than 24 hours of postoperative monitoring.    This comorbidity is: Coronary artery disease (CAD) by history and Other: Stent Placement April 2022. On Plavix.  DM, HgA1c 7.6    In addition, Patient will be admitted as inpatient status due to: Taking advanced age, medical co-morbidities and PLOF into consideration, the patient is at higher risk for falls, dehydration, electrolyte imbalance, post-operative anemia and a higher risk of adverse effects related to surgery and anesthesia. Will admit as inpatient for closer monitoring of vital signs for hypotension, level of consciousness, respiratory status and the patient's overall response to narcotics. Will also monitor labs daily, replace electrolytes and provide fluid resuscitation as needed and plan to facilitate discharge once patient is hemodynamically stable, electrolytes WNL, tolerating pain and narcotics appropriately and once the patient has been deemed safe from a mobility/safety standpoint.

## 2023-03-13 ENCOUNTER — TELEPHONE (OUTPATIENT)
Dept: ORTHOPEDICS | Facility: CLINIC | Age: 78
End: 2023-03-13
Payer: MEDICARE

## 2023-03-13 DIAGNOSIS — M17.11 PRIMARY OSTEOARTHRITIS OF RIGHT KNEE: Primary | ICD-10-CM

## 2023-03-13 NOTE — TELEPHONE ENCOUNTER
Patient called for a physical therapy script to be sent to [facility] on [facility] for after her right total knee arthroplasty on 4/17/23.

## 2023-03-30 ENCOUNTER — OFFICE VISIT (OUTPATIENT)
Dept: ORTHOPEDICS | Facility: CLINIC | Age: 78
End: 2023-03-30
Payer: MEDICARE

## 2023-03-30 ENCOUNTER — HOSPITAL ENCOUNTER (OUTPATIENT)
Dept: RADIOLOGY | Facility: HOSPITAL | Age: 78
Discharge: HOME | End: 2023-03-30
Attending: NURSE PRACTITIONER
Payer: MEDICARE

## 2023-03-30 ENCOUNTER — LAB VISIT (OUTPATIENT)
Dept: LAB | Facility: HOSPITAL | Age: 78
End: 2023-03-30
Attending: NURSE PRACTITIONER
Payer: MEDICARE

## 2023-03-30 ENCOUNTER — TELEPHONE (OUTPATIENT)
Dept: ORTHOPEDICS | Facility: CLINIC | Age: 78
End: 2023-03-30

## 2023-03-30 VITALS
HEIGHT: 60 IN | HEART RATE: 86 BPM | SYSTOLIC BLOOD PRESSURE: 151 MMHG | DIASTOLIC BLOOD PRESSURE: 77 MMHG | BODY MASS INDEX: 33.73 KG/M2 | WEIGHT: 171.81 LBS

## 2023-03-30 DIAGNOSIS — I25.10 CORONARY ARTERY DISEASE INVOLVING NATIVE CORONARY ARTERY OF NATIVE HEART WITHOUT ANGINA PECTORIS: Chronic | ICD-10-CM

## 2023-03-30 DIAGNOSIS — M17.11 PRIMARY OSTEOARTHRITIS OF RIGHT KNEE: Primary | ICD-10-CM

## 2023-03-30 DIAGNOSIS — Z01.818 PREOPERATIVE TESTING: ICD-10-CM

## 2023-03-30 DIAGNOSIS — I25.10 CORONARY ARTERY DISEASE INVOLVING NATIVE CORONARY ARTERY OF NATIVE HEART WITHOUT ANGINA PECTORIS: ICD-10-CM

## 2023-03-30 DIAGNOSIS — E11.9 TYPE 2 DIABETES MELLITUS WITHOUT COMPLICATION, WITHOUT LONG-TERM CURRENT USE OF INSULIN: ICD-10-CM

## 2023-03-30 DIAGNOSIS — N39.0 URINARY TRACT INFECTION WITHOUT HEMATURIA, SITE UNSPECIFIED: Primary | ICD-10-CM

## 2023-03-30 DIAGNOSIS — M17.11 PRIMARY OSTEOARTHRITIS OF RIGHT KNEE: ICD-10-CM

## 2023-03-30 DIAGNOSIS — M19.90 OSTEOARTHRITIS: ICD-10-CM

## 2023-03-30 LAB
APPEARANCE UR: ABNORMAL
BACTERIA #/AREA URNS AUTO: ABNORMAL /HPF
BILIRUB UR QL STRIP.AUTO: NEGATIVE MG/DL
COLOR UR AUTO: ABNORMAL
GLUCOSE UR QL STRIP.AUTO: NEGATIVE MG/DL
KETONES UR QL STRIP.AUTO: NEGATIVE MG/DL
LEUKOCYTE ESTERASE UR QL STRIP.AUTO: NEGATIVE UNIT/L
MRSA PCR SCRN (OHS): NOT DETECTED
NITRITE UR QL STRIP.AUTO: NEGATIVE
PH UR STRIP.AUTO: 5.5 [PH]
PROT UR QL STRIP.AUTO: NEGATIVE MG/DL
RBC #/AREA URNS AUTO: ABNORMAL /HPF
RBC UR QL AUTO: ABNORMAL UNIT/L
SP GR UR STRIP.AUTO: 1.02
SQUAMOUS #/AREA URNS AUTO: ABNORMAL /HPF
UROBILINOGEN UR STRIP-ACNC: 0.2 MG/DL
WBC #/AREA URNS AUTO: ABNORMAL /HPF
YEAST URNS QL MICRO: ABNORMAL /HPF

## 2023-03-30 PROCEDURE — 81001 URINALYSIS AUTO W/SCOPE: CPT

## 2023-03-30 PROCEDURE — 87088 URINE BACTERIA CULTURE: CPT

## 2023-03-30 PROCEDURE — 99214 OFFICE O/P EST MOD 30 MIN: CPT | Mod: ,,, | Performed by: NURSE PRACTITIONER

## 2023-03-30 PROCEDURE — 87077 CULTURE AEROBIC IDENTIFY: CPT

## 2023-03-30 PROCEDURE — 99214 PR OFFICE/OUTPT VISIT, EST, LEVL IV, 30-39 MIN: ICD-10-PCS | Mod: ,,, | Performed by: NURSE PRACTITIONER

## 2023-03-30 PROCEDURE — 71046 X-RAY EXAM CHEST 2 VIEWS: CPT | Mod: TC

## 2023-03-30 PROCEDURE — 73700 CT LOWER EXTREMITY W/O DYE: CPT | Mod: TC,RT

## 2023-03-30 RX ORDER — SCOLOPAMINE TRANSDERMAL SYSTEM 1 MG/1
1 PATCH, EXTENDED RELEASE TRANSDERMAL ONCE AS NEEDED
Status: CANCELLED | OUTPATIENT
Start: 2023-03-30 | End: 2034-08-25

## 2023-03-30 RX ORDER — SULFAMETHOXAZOLE AND TRIMETHOPRIM 800; 160 MG/1; MG/1
1 TABLET ORAL 2 TIMES DAILY
Qty: 14 TABLET | Refills: 0 | Status: SHIPPED | OUTPATIENT
Start: 2023-03-30 | End: 2023-04-06

## 2023-03-30 RX ORDER — ACETAMINOPHEN 500 MG
1000 TABLET ORAL
Status: CANCELLED | OUTPATIENT
Start: 2023-03-30

## 2023-03-30 RX ORDER — GABAPENTIN 100 MG/1
300 CAPSULE ORAL
Status: CANCELLED | OUTPATIENT
Start: 2023-03-30

## 2023-03-30 RX ORDER — SODIUM CHLORIDE, SODIUM GLUCONATE, SODIUM ACETATE, POTASSIUM CHLORIDE AND MAGNESIUM CHLORIDE 30; 37; 368; 526; 502 MG/100ML; MG/100ML; MG/100ML; MG/100ML; MG/100ML
250 INJECTION, SOLUTION INTRAVENOUS CONTINUOUS
Status: CANCELLED | OUTPATIENT
Start: 2023-03-30

## 2023-03-30 RX ORDER — TRANEXAMIC ACID 650 MG/1
1950 TABLET ORAL
Status: CANCELLED | OUTPATIENT
Start: 2023-03-30 | End: 2023-03-30

## 2023-03-30 NOTE — TELEPHONE ENCOUNTER
----- Message from TORRI Mack sent at 3/30/2023 12:58 PM CDT -----  Positive for UTI will send rx for abx

## 2023-03-30 NOTE — H&P (VIEW-ONLY)
Chief Complaint:   Chief Complaint   Patient presents with    Right Knee - Pre-op Exam     Pre-op for right TKA 4/17/23       History of present illness:  69-year-old female presents for evaluation follow-up of right knee pain.  Patient has significant pain in the right knee. She continues have pain to the anterior aspect of the knee mainly.  Also with pain medially and laterally.  Worse with activity.  Improved by rest.  She is tried multiple injections with very minimal relief.  His pain has began to affect her daily living activities.  She feels as though she is reached a point of disability would like to proceed with a right total knee arthroplasty.    Past Medical History:   Diagnosis Date    Chronic GERD     Essential (primary) hypertension     H/O heart artery stent     HLD (hyperlipidemia)     Hypothyroidism, unspecified     IC (interstitial cystitis)     Osteoarthritis of right knee     Osteopenia     Stress incontinence (female) (male)     Type 2 diabetes mellitus without complications        Past Surgical History:   Procedure Laterality Date    ARTHROPLASTY Right     shoulder    Bone and/or joint imaging; whole body   06/24/2019    broken shoulder bone      BUNIONECTOMY      CATARACT EXTRACTION      CHOLECYSTECTOMY      COLONOSCOPY  03/11/2013    ESOPHAGEAL DILATION      ESOPHAGOGASTRODUODENOSCOPY  04/16/2019    ESOPHAGOGASTRODUODENOSCOPY  03/04/2013    EYE SURGERY      HYSTERECTOMY      LUMBAR SPINE SURGERY  07/24/2019    SPINE SURGERY  July 2019    Lower back    STENT, AORTA      Suspension of bladder      TUBAL LIGATION  1975       Current Outpatient Medications   Medication Sig    aspirin (ECOTRIN) 81 MG EC tablet Take 81 mg by mouth once daily at 6am.    blood sugar diagnostic Strp To check BG 1 time daily, to use with insurance preferred meter    blood-glucose meter kit To check BG 1 time daily, to use with insurance preferred meter    carvediloL (COREG) 12.5 MG tablet Take 1 tablet (12.5 mg  total) by mouth 2 (two) times a day. (Patient taking differently: Take 12.5 mg by mouth Daily.)    clopidogreL (PLAVIX) 75 mg tablet Take 75 mg by mouth once daily.    FLAXSEED OIL-OMEGA 3,6,9 ORAL Take 1 capsule by mouth 3 (three) times daily.    glyBURIDE-metformin (GLUCOVANCE) 2.5-500 mg per tablet Take 1 tablet by mouth 2 (two) times daily with meals.    isosorbide mononitrate (IMDUR) 30 MG 24 hr tablet Take 1 tablet (30 mg total) by mouth once daily.    lancets Misc To check BG 1 time daily, to use with insurance preferred meter    nitroGLYCERIN (NITROSTAT) 0.4 MG SL tablet TAKE 1 TABLET (SUBLINGUAL) ONCE EVERY 5 MIN FOR 3 DOSES FOR CHEST PAIN IF NOT RELIEVED GO TO ER.    pantoprazole (PROTONIX) 40 MG tablet Take 1 tablet (40 mg total) by mouth once daily.    red yeast rice 600 mg Cap Take 1 tablet by mouth Daily.    TRUEPLUS LANCETS 28 gauge Misc Apply 1 lancet topically Daily.    zinc acetate 25 mg (zinc) Cap Take 25 mg by mouth once daily.     No current facility-administered medications for this visit.       Review of patient's allergies indicates:   Allergen Reactions    Adhesive Dermatitis    Niacin Shortness Of Breath     Other reaction(s): UNKNOWN  Redness and SOB    Lisinopril Other (See Comments)    Latex      Other reaction(s): UNKNOWN       Family History   Problem Relation Age of Onset    Heart attack Mother     Heart disease Mother     Diabetes Father     Cancer Sister     Idiopathic pulmonary fibrosis Brother     Cancer Brother         I P F       Social History     Socioeconomic History    Marital status:    Tobacco Use    Smoking status: Never     Passive exposure: Never    Smokeless tobacco: Never   Substance and Sexual Activity    Alcohol use: Not Currently    Drug use: Never    Sexual activity: Yes     Partners: Female           Review of Systems:    Constitution: Negative for chills, fever, and sweats.  Negative for unexplained weight loss.    HENT:  Negative for headaches and blurry  vision.    Cardiovascular:Negative for chest pain or irregular heart beat. Negative for hypertension.    Respiratory:  Negative for cough and shortness of breath.    Gastrointestinal: Negative for abdominal pain, heartburn, melena, nausea, and vomitting.    Genitourinary:  Negative bladder incontinence and dysuria.    Musculoskeletal:  See HPI    Neurological: Negative for numbness.    Psychiatric/Behavioral: Negative for depression.  The patient is not nervous/anxious.      Endocrine: Negative for polyuria    Hematologic/Lymphatic: Negative for bleeding problem.  Does not bruise/bleed easily.    Skin: Negative for poor would healing and rash      Physical Examination:    Vital Signs:    Vitals:    03/30/23 0747   BP: (!) 151/77   Pulse: 86       Body mass index is 33.55 kg/m².    General: No acute distress, alert and oriented, healthy appearing    HEENT: Head is atraumatic, mucous membranes are moist    Neck: Supples, no JVD    Cardiovascular: Palpable dorsalis pedis and posterior tibial pulses, regular rate and rhythm to those pulses    Lungs: Breathing non-labored    Skin: no rashes appreciated    Neurologic: Can flex and extend knees, ankles, and toes. Sensation is grossly intact    Right knee:  Patient is having crepitus range of motion right knee.  She is brisk cap refill distally.  Sensation intact distally.  patient gets full extension.  Flexion of 110°.    X-rays:      Assessment::  Right knee osteoarthritis    Plan:  At this point the patient is tried and failed all conservative management with regards to their right knee. They have tried and failed nonoperative management including: Anti-inflammatories, activity modification, injections. All of these have failed to completely remove their pain. They have pain going up and down stairs as well as walking on level ground. The knee pain is affecting activities of daily living They feel that they've reached a point of disability with regards to their knee.  X-rays reveal advanced, end-stage degenerative osteoarthritis as noted by subchondral sclerosis, joint space narrowing in the medial, patellofemoral, lateral compartment, and periarticular osteophytes. The patient would like to proceed with surgical intervention and would be a good candidate for total knee replacement.    Total knee arthroplasty procedure, alternatives, risks, and benefits were discussed in detail. The risks including but not limited to: infection, need for revision surgery, pain, swelling, loosening, injury to surrounding neurovascular structures, stiffness, incomplete resolution of pain, DVT, PE, and death were discussed in detail. Despite these risks, the patient would like to proceed with surgical intervention. All questions were answered, no guarantees made. Will plan for right TKA on 04/17/2023.    The patient has a history of cardiac disease and is at high risk for adverse effects of surgery and anesthesia. We will admit as inpatient and expect the patient to stay 2 nights in the hospital. We will closely monitor fluid intake and output, vital signs, neurological assessments and will use telemetry if needed. We plan for discharge once patient is stable and at baseline    Considering the patient's history of diabetes, the patient is at higher risk for uncontrolled blood sugars. We will admit as inpatient and expect the patient to stay two midnight's in the hospital to monitor CBG'S AC/HS with an insulin sliding scale and monitored dietary intake and maintain tight glycemic control. We will discharge once the patient is stable and blood sugars are well controlled    The above findings, diagnostics, and treatment plan were discussed with Dr. Yung Allen who is in agreement with the plan of care.      This note was created using [employer] voice recognition software that occasionally misinterpreted phrases or words.    Consult note is delivered via Epic messaging service.

## 2023-03-30 NOTE — PROGRESS NOTES
Chief Complaint:   Chief Complaint   Patient presents with    Right Knee - Pre-op Exam     Pre-op for right TKA 4/17/23       History of present illness:  69-year-old female presents for evaluation follow-up of right knee pain.  Patient has significant pain in the right knee. She continues have pain to the anterior aspect of the knee mainly.  Also with pain medially and laterally.  Worse with activity.  Improved by rest.  She is tried multiple injections with very minimal relief.  His pain has began to affect her daily living activities.  She feels as though she is reached a point of disability would like to proceed with a right total knee arthroplasty.    Past Medical History:   Diagnosis Date    Chronic GERD     Essential (primary) hypertension     H/O heart artery stent     HLD (hyperlipidemia)     Hypothyroidism, unspecified     IC (interstitial cystitis)     Osteoarthritis of right knee     Osteopenia     Stress incontinence (female) (male)     Type 2 diabetes mellitus without complications        Past Surgical History:   Procedure Laterality Date    ARTHROPLASTY Right     shoulder    Bone and/or joint imaging; whole body   06/24/2019    broken shoulder bone      BUNIONECTOMY      CATARACT EXTRACTION      CHOLECYSTECTOMY      COLONOSCOPY  03/11/2013    ESOPHAGEAL DILATION      ESOPHAGOGASTRODUODENOSCOPY  04/16/2019    ESOPHAGOGASTRODUODENOSCOPY  03/04/2013    EYE SURGERY      HYSTERECTOMY      LUMBAR SPINE SURGERY  07/24/2019    SPINE SURGERY  July 2019    Lower back    STENT, AORTA      Suspension of bladder      TUBAL LIGATION  1975       Current Outpatient Medications   Medication Sig    aspirin (ECOTRIN) 81 MG EC tablet Take 81 mg by mouth once daily at 6am.    blood sugar diagnostic Strp To check BG 1 time daily, to use with insurance preferred meter    blood-glucose meter kit To check BG 1 time daily, to use with insurance preferred meter    carvediloL (COREG) 12.5 MG tablet Take 1 tablet (12.5 mg  total) by mouth 2 (two) times a day. (Patient taking differently: Take 12.5 mg by mouth Daily.)    clopidogreL (PLAVIX) 75 mg tablet Take 75 mg by mouth once daily.    FLAXSEED OIL-OMEGA 3,6,9 ORAL Take 1 capsule by mouth 3 (three) times daily.    glyBURIDE-metformin (GLUCOVANCE) 2.5-500 mg per tablet Take 1 tablet by mouth 2 (two) times daily with meals.    isosorbide mononitrate (IMDUR) 30 MG 24 hr tablet Take 1 tablet (30 mg total) by mouth once daily.    lancets Misc To check BG 1 time daily, to use with insurance preferred meter    nitroGLYCERIN (NITROSTAT) 0.4 MG SL tablet TAKE 1 TABLET (SUBLINGUAL) ONCE EVERY 5 MIN FOR 3 DOSES FOR CHEST PAIN IF NOT RELIEVED GO TO ER.    pantoprazole (PROTONIX) 40 MG tablet Take 1 tablet (40 mg total) by mouth once daily.    red yeast rice 600 mg Cap Take 1 tablet by mouth Daily.    TRUEPLUS LANCETS 28 gauge Misc Apply 1 lancet topically Daily.    zinc acetate 25 mg (zinc) Cap Take 25 mg by mouth once daily.     No current facility-administered medications for this visit.       Review of patient's allergies indicates:   Allergen Reactions    Adhesive Dermatitis    Niacin Shortness Of Breath     Other reaction(s): UNKNOWN  Redness and SOB    Lisinopril Other (See Comments)    Latex      Other reaction(s): UNKNOWN       Family History   Problem Relation Age of Onset    Heart attack Mother     Heart disease Mother     Diabetes Father     Cancer Sister     Idiopathic pulmonary fibrosis Brother     Cancer Brother         I P F       Social History     Socioeconomic History    Marital status:    Tobacco Use    Smoking status: Never     Passive exposure: Never    Smokeless tobacco: Never   Substance and Sexual Activity    Alcohol use: Not Currently    Drug use: Never    Sexual activity: Yes     Partners: Female           Review of Systems:    Constitution: Negative for chills, fever, and sweats.  Negative for unexplained weight loss.    HENT:  Negative for headaches and blurry  vision.    Cardiovascular:Negative for chest pain or irregular heart beat. Negative for hypertension.    Respiratory:  Negative for cough and shortness of breath.    Gastrointestinal: Negative for abdominal pain, heartburn, melena, nausea, and vomitting.    Genitourinary:  Negative bladder incontinence and dysuria.    Musculoskeletal:  See HPI    Neurological: Negative for numbness.    Psychiatric/Behavioral: Negative for depression.  The patient is not nervous/anxious.      Endocrine: Negative for polyuria    Hematologic/Lymphatic: Negative for bleeding problem.  Does not bruise/bleed easily.    Skin: Negative for poor would healing and rash      Physical Examination:    Vital Signs:    Vitals:    03/30/23 0747   BP: (!) 151/77   Pulse: 86       Body mass index is 33.55 kg/m².    General: No acute distress, alert and oriented, healthy appearing    HEENT: Head is atraumatic, mucous membranes are moist    Neck: Supples, no JVD    Cardiovascular: Palpable dorsalis pedis and posterior tibial pulses, regular rate and rhythm to those pulses    Lungs: Breathing non-labored    Skin: no rashes appreciated    Neurologic: Can flex and extend knees, ankles, and toes. Sensation is grossly intact    Right knee:  Patient is having crepitus range of motion right knee.  She is brisk cap refill distally.  Sensation intact distally.  patient gets full extension.  Flexion of 110°.    X-rays:      Assessment::  Right knee osteoarthritis    Plan:  At this point the patient is tried and failed all conservative management with regards to their right knee. They have tried and failed nonoperative management including: Anti-inflammatories, activity modification, injections. All of these have failed to completely remove their pain. They have pain going up and down stairs as well as walking on level ground. The knee pain is affecting activities of daily living They feel that they've reached a point of disability with regards to their knee.  X-rays reveal advanced, end-stage degenerative osteoarthritis as noted by subchondral sclerosis, joint space narrowing in the medial, patellofemoral, lateral compartment, and periarticular osteophytes. The patient would like to proceed with surgical intervention and would be a good candidate for total knee replacement.    Total knee arthroplasty procedure, alternatives, risks, and benefits were discussed in detail. The risks including but not limited to: infection, need for revision surgery, pain, swelling, loosening, injury to surrounding neurovascular structures, stiffness, incomplete resolution of pain, DVT, PE, and death were discussed in detail. Despite these risks, the patient would like to proceed with surgical intervention. All questions were answered, no guarantees made. Will plan for right TKA on 04/17/2023.    The patient has a history of cardiac disease and is at high risk for adverse effects of surgery and anesthesia. We will admit as inpatient and expect the patient to stay 2 nights in the hospital. We will closely monitor fluid intake and output, vital signs, neurological assessments and will use telemetry if needed. We plan for discharge once patient is stable and at baseline    Considering the patient's history of diabetes, the patient is at higher risk for uncontrolled blood sugars. We will admit as inpatient and expect the patient to stay two midnight's in the hospital to monitor CBG'S AC/HS with an insulin sliding scale and monitored dietary intake and maintain tight glycemic control. We will discharge once the patient is stable and blood sugars are well controlled    The above findings, diagnostics, and treatment plan were discussed with Dr. Yung Allen who is in agreement with the plan of care.      This note was created using [employer] voice recognition software that occasionally misinterpreted phrases or words.    Consult note is delivered via Epic messaging service.

## 2023-04-02 LAB — BACTERIA UR CULT: ABNORMAL

## 2023-04-12 ENCOUNTER — ANESTHESIA EVENT (OUTPATIENT)
Dept: SURGERY | Facility: HOSPITAL | Age: 78
DRG: 470 | End: 2023-04-12
Payer: MEDICARE

## 2023-04-14 ENCOUNTER — PATIENT MESSAGE (OUTPATIENT)
Dept: ADMINISTRATIVE | Facility: OTHER | Age: 78
End: 2023-04-14
Payer: MEDICARE

## 2023-04-15 ENCOUNTER — PATIENT MESSAGE (OUTPATIENT)
Dept: ADMINISTRATIVE | Facility: OTHER | Age: 78
End: 2023-04-15
Payer: MEDICARE

## 2023-04-16 ENCOUNTER — PATIENT MESSAGE (OUTPATIENT)
Dept: ADMINISTRATIVE | Facility: OTHER | Age: 78
End: 2023-04-16
Payer: MEDICARE

## 2023-04-17 ENCOUNTER — ANESTHESIA (OUTPATIENT)
Dept: SURGERY | Facility: HOSPITAL | Age: 78
DRG: 470 | End: 2023-04-17
Payer: MEDICARE

## 2023-04-17 ENCOUNTER — HOSPITAL ENCOUNTER (INPATIENT)
Facility: HOSPITAL | Age: 78
LOS: 1 days | Discharge: HOME | DRG: 470 | End: 2023-04-18
Attending: ORTHOPAEDIC SURGERY | Admitting: ORTHOPAEDIC SURGERY
Payer: MEDICARE

## 2023-04-17 DIAGNOSIS — I25.10 CORONARY ARTERY DISEASE INVOLVING NATIVE CORONARY ARTERY OF NATIVE HEART WITHOUT ANGINA PECTORIS: Chronic | ICD-10-CM

## 2023-04-17 DIAGNOSIS — E11.9 TYPE 2 DIABETES MELLITUS WITHOUT COMPLICATION, WITHOUT LONG-TERM CURRENT USE OF INSULIN: ICD-10-CM

## 2023-04-17 DIAGNOSIS — Z01.818 PREOPERATIVE TESTING: ICD-10-CM

## 2023-04-17 DIAGNOSIS — M17.11 PRIMARY OSTEOARTHRITIS OF RIGHT KNEE: ICD-10-CM

## 2023-04-17 DIAGNOSIS — M19.90 OSTEOARTHRITIS: ICD-10-CM

## 2023-04-17 LAB
HCT VFR BLD AUTO: 33.3 % (ref 37–47)
HGB BLD-MCNC: 9.9 G/DL (ref 12–16)
POCT GLUCOSE: 144 MG/DL (ref 70–110)
POCT GLUCOSE: 184 MG/DL (ref 70–110)
POCT GLUCOSE: 201 MG/DL (ref 70–110)
POCT GLUCOSE: 229 MG/DL (ref 70–110)

## 2023-04-17 PROCEDURE — 51798 US URINE CAPACITY MEASURE: CPT | Performed by: ORTHOPAEDIC SURGERY

## 2023-04-17 PROCEDURE — 25000003 PHARM REV CODE 250: Performed by: ANESTHESIOLOGY

## 2023-04-17 PROCEDURE — 88311 DECALCIFY TISSUE: CPT

## 2023-04-17 PROCEDURE — 37000008 HC ANESTHESIA 1ST 15 MINUTES: Performed by: ORTHOPAEDIC SURGERY

## 2023-04-17 PROCEDURE — 27447 TOTAL KNEE ARTHROPLASTY: CPT | Mod: RT,,, | Performed by: ORTHOPAEDIC SURGERY

## 2023-04-17 PROCEDURE — 0055T PR COMPUTER-ASSIST MUSCSKEL NAVIG, ORTHO PROC, CT/MRI: ICD-10-PCS | Mod: ,,, | Performed by: ORTHOPAEDIC SURGERY

## 2023-04-17 PROCEDURE — 25000003 PHARM REV CODE 250: Performed by: NURSE PRACTITIONER

## 2023-04-17 PROCEDURE — 63600175 PHARM REV CODE 636 W HCPCS: Performed by: STUDENT IN AN ORGANIZED HEALTH CARE EDUCATION/TRAINING PROGRAM

## 2023-04-17 PROCEDURE — 37000009 HC ANESTHESIA EA ADD 15 MINS: Performed by: ORTHOPAEDIC SURGERY

## 2023-04-17 PROCEDURE — 94799 UNLISTED PULMONARY SVC/PX: CPT

## 2023-04-17 PROCEDURE — 36000712 HC OR TIME LEV V 1ST 15 MIN: Performed by: ORTHOPAEDIC SURGERY

## 2023-04-17 PROCEDURE — 11000001 HC ACUTE MED/SURG PRIVATE ROOM

## 2023-04-17 PROCEDURE — 64447 NJX AA&/STRD FEMORAL NRV IMG: CPT | Performed by: ANESTHESIOLOGY

## 2023-04-17 PROCEDURE — 63600175 PHARM REV CODE 636 W HCPCS: Performed by: ORTHOPAEDIC SURGERY

## 2023-04-17 PROCEDURE — 85014 HEMATOCRIT: CPT | Performed by: ORTHOPAEDIC SURGERY

## 2023-04-17 PROCEDURE — D9220A PRA ANESTHESIA: Mod: ANES,,, | Performed by: ANESTHESIOLOGY

## 2023-04-17 PROCEDURE — 25000003 PHARM REV CODE 250: Performed by: ORTHOPAEDIC SURGERY

## 2023-04-17 PROCEDURE — 27447 PR TOTAL KNEE ARTHROPLASTY: ICD-10-PCS | Mod: RT,,, | Performed by: ORTHOPAEDIC SURGERY

## 2023-04-17 PROCEDURE — 71000033 HC RECOVERY, INTIAL HOUR: Performed by: ORTHOPAEDIC SURGERY

## 2023-04-17 PROCEDURE — 27447 PR TOTAL KNEE ARTHROPLASTY: ICD-10-PCS | Mod: AS,RT,, | Performed by: NURSE PRACTITIONER

## 2023-04-17 PROCEDURE — 82962 GLUCOSE BLOOD TEST: CPT | Performed by: ORTHOPAEDIC SURGERY

## 2023-04-17 PROCEDURE — 36000713 HC OR TIME LEV V EA ADD 15 MIN: Performed by: ORTHOPAEDIC SURGERY

## 2023-04-17 PROCEDURE — D9220A PRA ANESTHESIA: Mod: CRNA,,, | Performed by: STUDENT IN AN ORGANIZED HEALTH CARE EDUCATION/TRAINING PROGRAM

## 2023-04-17 PROCEDURE — 0055T BONE SRGRY CMPTR CT/MRI IMAG: CPT | Mod: ,,, | Performed by: ORTHOPAEDIC SURGERY

## 2023-04-17 PROCEDURE — 27447 TOTAL KNEE ARTHROPLASTY: CPT | Mod: AS,RT,, | Performed by: NURSE PRACTITIONER

## 2023-04-17 PROCEDURE — 25000003 PHARM REV CODE 250: Performed by: STUDENT IN AN ORGANIZED HEALTH CARE EDUCATION/TRAINING PROGRAM

## 2023-04-17 PROCEDURE — 64447 NJX AA&/STRD FEMORAL NRV IMG: CPT | Mod: 59,RT,, | Performed by: ANESTHESIOLOGY

## 2023-04-17 PROCEDURE — C1776 JOINT DEVICE (IMPLANTABLE): HCPCS | Performed by: ORTHOPAEDIC SURGERY

## 2023-04-17 PROCEDURE — 63600175 PHARM REV CODE 636 W HCPCS: Performed by: ANESTHESIOLOGY

## 2023-04-17 PROCEDURE — 97162 PT EVAL MOD COMPLEX 30 MIN: CPT

## 2023-04-17 PROCEDURE — 64447 PERIPHERAL BLOCK: ICD-10-PCS | Mod: 59,RT,, | Performed by: ANESTHESIOLOGY

## 2023-04-17 PROCEDURE — 27201423 OPTIME MED/SURG SUP & DEVICES STERILE SUPPLY: Performed by: ORTHOPAEDIC SURGERY

## 2023-04-17 PROCEDURE — D9220A PRA ANESTHESIA: ICD-10-PCS | Mod: CRNA,,, | Performed by: STUDENT IN AN ORGANIZED HEALTH CARE EDUCATION/TRAINING PROGRAM

## 2023-04-17 PROCEDURE — 88305 TISSUE EXAM BY PATHOLOGIST: CPT | Performed by: ORTHOPAEDIC SURGERY

## 2023-04-17 PROCEDURE — 51798 US URINE CAPACITY MEASURE: CPT

## 2023-04-17 PROCEDURE — D9220A PRA ANESTHESIA: ICD-10-PCS | Mod: ANES,,, | Performed by: ANESTHESIOLOGY

## 2023-04-17 PROCEDURE — C1713 ANCHOR/SCREW BN/BN,TIS/BN: HCPCS | Performed by: ORTHOPAEDIC SURGERY

## 2023-04-17 PROCEDURE — A4216 STERILE WATER/SALINE, 10 ML: HCPCS | Performed by: ORTHOPAEDIC SURGERY

## 2023-04-17 DEVICE — TIBIAL BEARING INSERT - CR
Type: IMPLANTABLE DEVICE | Site: KNEE | Status: FUNCTIONAL
Brand: TRIATHLON

## 2023-04-17 DEVICE — CRUCIATE RETAINING FEMORAL
Type: IMPLANTABLE DEVICE | Site: KNEE | Status: FUNCTIONAL
Brand: TRIATHLON

## 2023-04-17 DEVICE — TIBIAL COMPONENT
Type: IMPLANTABLE DEVICE | Site: KNEE | Status: FUNCTIONAL
Brand: TRIATHLON

## 2023-04-17 RX ORDER — CARVEDILOL 6.25 MG/1
12.5 TABLET ORAL DAILY
Status: DISCONTINUED | OUTPATIENT
Start: 2023-04-17 | End: 2023-04-17

## 2023-04-17 RX ORDER — GLUCAGON 1 MG
1 KIT INJECTION
Status: DISCONTINUED | OUTPATIENT
Start: 2023-04-17 | End: 2023-04-18 | Stop reason: HOSPADM

## 2023-04-17 RX ORDER — ACETAMINOPHEN 500 MG
1000 TABLET ORAL
Status: COMPLETED | OUTPATIENT
Start: 2023-04-17 | End: 2023-04-17

## 2023-04-17 RX ORDER — HYDROMORPHONE HYDROCHLORIDE 2 MG/ML
0.2 INJECTION, SOLUTION INTRAMUSCULAR; INTRAVENOUS; SUBCUTANEOUS EVERY 5 MIN PRN
Status: DISCONTINUED | OUTPATIENT
Start: 2023-04-17 | End: 2023-04-17

## 2023-04-17 RX ORDER — EPINEPHRINE 1 MG/ML
INJECTION, SOLUTION, CONCENTRATE INTRAVENOUS
Status: DISCONTINUED | OUTPATIENT
Start: 2023-04-17 | End: 2023-04-17 | Stop reason: HOSPADM

## 2023-04-17 RX ORDER — IBUPROFEN 200 MG
15 TABLET ORAL
Status: DISCONTINUED | OUTPATIENT
Start: 2023-04-17 | End: 2023-04-18 | Stop reason: HOSPADM

## 2023-04-17 RX ORDER — METHOCARBAMOL 750 MG/1
750 TABLET, FILM COATED ORAL EVERY 8 HOURS PRN
Status: DISCONTINUED | OUTPATIENT
Start: 2023-04-17 | End: 2023-04-18

## 2023-04-17 RX ORDER — CARVEDILOL 6.25 MG/1
12.5 TABLET ORAL NIGHTLY
Status: DISCONTINUED | OUTPATIENT
Start: 2023-04-17 | End: 2023-04-18 | Stop reason: HOSPADM

## 2023-04-17 RX ORDER — SODIUM CHLORIDE 9 MG/ML
INJECTION, SOLUTION INTRAMUSCULAR; INTRAVENOUS; SUBCUTANEOUS
Status: DISCONTINUED | OUTPATIENT
Start: 2023-04-17 | End: 2023-04-17 | Stop reason: HOSPADM

## 2023-04-17 RX ORDER — MAG HYDROX/ALUMINUM HYD/SIMETH 200-200-20
30 SUSPENSION, ORAL (FINAL DOSE FORM) ORAL EVERY 6 HOURS PRN
Status: DISCONTINUED | OUTPATIENT
Start: 2023-04-17 | End: 2023-04-18 | Stop reason: HOSPADM

## 2023-04-17 RX ORDER — CLOPIDOGREL BISULFATE 75 MG/1
75 TABLET ORAL NIGHTLY
Status: DISCONTINUED | OUTPATIENT
Start: 2023-04-18 | End: 2023-04-18 | Stop reason: HOSPADM

## 2023-04-17 RX ORDER — METOCLOPRAMIDE HYDROCHLORIDE 5 MG/ML
10 INJECTION INTRAMUSCULAR; INTRAVENOUS
Status: DISCONTINUED | OUTPATIENT
Start: 2023-04-17 | End: 2023-04-18

## 2023-04-17 RX ORDER — TRAMADOL HYDROCHLORIDE 50 MG/1
50 TABLET ORAL EVERY 4 HOURS PRN
Status: DISCONTINUED | OUTPATIENT
Start: 2023-04-17 | End: 2023-04-18 | Stop reason: HOSPADM

## 2023-04-17 RX ORDER — LACTULOSE 10 G/15ML
20 SOLUTION ORAL EVERY 6 HOURS PRN
Status: DISCONTINUED | OUTPATIENT
Start: 2023-04-17 | End: 2023-04-18 | Stop reason: HOSPADM

## 2023-04-17 RX ORDER — FAMOTIDINE 20 MG/1
20 TABLET, FILM COATED ORAL 2 TIMES DAILY
Status: DISCONTINUED | OUTPATIENT
Start: 2023-04-17 | End: 2023-04-18

## 2023-04-17 RX ORDER — PROPOFOL 10 MG/ML
VIAL (ML) INTRAVENOUS
Status: DISCONTINUED | OUTPATIENT
Start: 2023-04-17 | End: 2023-04-17

## 2023-04-17 RX ORDER — HYDROCODONE BITARTRATE AND ACETAMINOPHEN 5; 325 MG/1; MG/1
1 TABLET ORAL EVERY 4 HOURS PRN
Status: DISCONTINUED | OUTPATIENT
Start: 2023-04-17 | End: 2023-04-18 | Stop reason: HOSPADM

## 2023-04-17 RX ORDER — EPINEPHRINE 1 MG/ML
INJECTION, SOLUTION, CONCENTRATE INTRAVENOUS
Status: DISPENSED
Start: 2023-04-17 | End: 2023-04-17

## 2023-04-17 RX ORDER — IBUPROFEN 200 MG
24 TABLET ORAL
Status: DISCONTINUED | OUTPATIENT
Start: 2023-04-17 | End: 2023-04-18 | Stop reason: HOSPADM

## 2023-04-17 RX ORDER — PROCHLORPERAZINE EDISYLATE 5 MG/ML
5 INJECTION INTRAMUSCULAR; INTRAVENOUS EVERY 30 MIN PRN
Status: DISCONTINUED | OUTPATIENT
Start: 2023-04-17 | End: 2023-04-17 | Stop reason: HOSPADM

## 2023-04-17 RX ORDER — DIPHENHYDRAMINE HYDROCHLORIDE 50 MG/ML
25 INJECTION INTRAMUSCULAR; INTRAVENOUS EVERY 6 HOURS PRN
Status: DISCONTINUED | OUTPATIENT
Start: 2023-04-17 | End: 2023-04-17 | Stop reason: HOSPADM

## 2023-04-17 RX ORDER — SODIUM CHLORIDE, SODIUM GLUCONATE, SODIUM ACETATE, POTASSIUM CHLORIDE AND MAGNESIUM CHLORIDE 30; 37; 368; 526; 502 MG/100ML; MG/100ML; MG/100ML; MG/100ML; MG/100ML
250 INJECTION, SOLUTION INTRAVENOUS CONTINUOUS
Status: DISCONTINUED | OUTPATIENT
Start: 2023-04-17 | End: 2023-04-17

## 2023-04-17 RX ORDER — GLYBURIDE-METFORMIN HYDROCHLORIDE 2.5; 5 MG/1; MG/1
1 TABLET ORAL 2 TIMES DAILY WITH MEALS
Status: DISCONTINUED | OUTPATIENT
Start: 2023-04-17 | End: 2023-04-17

## 2023-04-17 RX ORDER — SODIUM CHLORIDE, SODIUM GLUCONATE, SODIUM ACETATE, POTASSIUM CHLORIDE AND MAGNESIUM CHLORIDE 30; 37; 368; 526; 502 MG/100ML; MG/100ML; MG/100ML; MG/100ML; MG/100ML
INJECTION, SOLUTION INTRAVENOUS CONTINUOUS
Status: DISCONTINUED | OUTPATIENT
Start: 2023-04-17 | End: 2023-04-17

## 2023-04-17 RX ORDER — ISOSORBIDE MONONITRATE 30 MG/1
30 TABLET, EXTENDED RELEASE ORAL NIGHTLY
Status: DISCONTINUED | OUTPATIENT
Start: 2023-04-17 | End: 2023-04-18 | Stop reason: HOSPADM

## 2023-04-17 RX ORDER — MIDAZOLAM HYDROCHLORIDE 1 MG/ML
2 INJECTION INTRAMUSCULAR; INTRAVENOUS ONCE
Status: COMPLETED | OUTPATIENT
Start: 2023-04-17 | End: 2023-04-17

## 2023-04-17 RX ORDER — ROCURONIUM BROMIDE 10 MG/ML
INJECTION, SOLUTION INTRAVENOUS
Status: DISCONTINUED | OUTPATIENT
Start: 2023-04-17 | End: 2023-04-17

## 2023-04-17 RX ORDER — PHENYLEPHRINE HCL IN 0.9% NACL 1 MG/10 ML
SYRINGE (ML) INTRAVENOUS
Status: DISCONTINUED | OUTPATIENT
Start: 2023-04-17 | End: 2023-04-17

## 2023-04-17 RX ORDER — CLOPIDOGREL BISULFATE 75 MG/1
75 TABLET ORAL DAILY
Status: DISCONTINUED | OUTPATIENT
Start: 2023-04-17 | End: 2023-04-17

## 2023-04-17 RX ORDER — MEPERIDINE HYDROCHLORIDE 25 MG/ML
12.5 INJECTION INTRAMUSCULAR; INTRAVENOUS; SUBCUTANEOUS EVERY 10 MIN PRN
Status: DISCONTINUED | OUTPATIENT
Start: 2023-04-17 | End: 2023-04-17 | Stop reason: HOSPADM

## 2023-04-17 RX ORDER — ACETAMINOPHEN 10 MG/ML
1000 INJECTION, SOLUTION INTRAVENOUS ONCE
Status: COMPLETED | OUTPATIENT
Start: 2023-04-17 | End: 2023-04-17

## 2023-04-17 RX ORDER — METFORMIN HYDROCHLORIDE 500 MG/1
500 TABLET ORAL 2 TIMES DAILY WITH MEALS
Status: DISCONTINUED | OUTPATIENT
Start: 2023-04-17 | End: 2023-04-18 | Stop reason: HOSPADM

## 2023-04-17 RX ORDER — SCOLOPAMINE TRANSDERMAL SYSTEM 1 MG/1
1 PATCH, EXTENDED RELEASE TRANSDERMAL ONCE AS NEEDED
Status: DISCONTINUED | OUTPATIENT
Start: 2023-04-17 | End: 2023-04-17

## 2023-04-17 RX ORDER — ONDANSETRON 2 MG/ML
4 INJECTION INTRAMUSCULAR; INTRAVENOUS EVERY 6 HOURS PRN
Status: DISCONTINUED | OUTPATIENT
Start: 2023-04-17 | End: 2023-04-18 | Stop reason: HOSPADM

## 2023-04-17 RX ORDER — BUPIVACAINE HYDROCHLORIDE AND EPINEPHRINE 2.5; 5 MG/ML; UG/ML
INJECTION, SOLUTION EPIDURAL; INFILTRATION; INTRACAUDAL; PERINEURAL
Status: COMPLETED
Start: 2023-04-17 | End: 2023-04-17

## 2023-04-17 RX ORDER — LIDOCAINE HYDROCHLORIDE 10 MG/ML
1 INJECTION, SOLUTION EPIDURAL; INFILTRATION; INTRACAUDAL; PERINEURAL ONCE
Status: DISCONTINUED | OUTPATIENT
Start: 2023-04-17 | End: 2023-04-17

## 2023-04-17 RX ORDER — KETOROLAC TROMETHAMINE 10 MG/1
10 TABLET, FILM COATED ORAL EVERY 6 HOURS
Status: DISCONTINUED | OUTPATIENT
Start: 2023-04-17 | End: 2023-04-18

## 2023-04-17 RX ORDER — MORPHINE SULFATE 10 MG/ML
INJECTION INTRAMUSCULAR; INTRAVENOUS; SUBCUTANEOUS
Status: DISPENSED
Start: 2023-04-17 | End: 2023-04-17

## 2023-04-17 RX ORDER — BISACODYL 10 MG
10 SUPPOSITORY, RECTAL RECTAL DAILY
Status: DISCONTINUED | OUTPATIENT
Start: 2023-04-20 | End: 2023-04-18 | Stop reason: HOSPADM

## 2023-04-17 RX ORDER — TRANEXAMIC ACID 650 MG/1
1950 TABLET ORAL
Status: COMPLETED | OUTPATIENT
Start: 2023-04-17 | End: 2023-04-17

## 2023-04-17 RX ORDER — SODIUM CHLORIDE 0.9 % (FLUSH) 0.9 %
SYRINGE (ML) INJECTION
Status: DISPENSED
Start: 2023-04-17 | End: 2023-04-17

## 2023-04-17 RX ORDER — GABAPENTIN 300 MG/1
300 CAPSULE ORAL
Status: COMPLETED | OUTPATIENT
Start: 2023-04-17 | End: 2023-04-17

## 2023-04-17 RX ORDER — LIDOCAINE HYDROCHLORIDE 10 MG/ML
INJECTION, SOLUTION EPIDURAL; INFILTRATION; INTRACAUDAL; PERINEURAL
Status: DISCONTINUED | OUTPATIENT
Start: 2023-04-17 | End: 2023-04-17

## 2023-04-17 RX ORDER — INSULIN ASPART 100 [IU]/ML
1-10 INJECTION, SOLUTION INTRAVENOUS; SUBCUTANEOUS
Status: DISCONTINUED | OUTPATIENT
Start: 2023-04-17 | End: 2023-04-18 | Stop reason: HOSPADM

## 2023-04-17 RX ORDER — GLYBURIDE 1.25 MG/1
2.5 TABLET ORAL 2 TIMES DAILY WITH MEALS
Status: DISCONTINUED | OUTPATIENT
Start: 2023-04-17 | End: 2023-04-18 | Stop reason: HOSPADM

## 2023-04-17 RX ORDER — GABAPENTIN 300 MG/1
300 CAPSULE ORAL NIGHTLY
Status: DISCONTINUED | OUTPATIENT
Start: 2023-04-17 | End: 2023-04-17

## 2023-04-17 RX ORDER — KETOROLAC TROMETHAMINE 30 MG/ML
INJECTION, SOLUTION INTRAMUSCULAR; INTRAVENOUS
Status: DISCONTINUED | OUTPATIENT
Start: 2023-04-17 | End: 2023-04-17 | Stop reason: HOSPADM

## 2023-04-17 RX ORDER — ONDANSETRON 2 MG/ML
4 INJECTION INTRAMUSCULAR; INTRAVENOUS DAILY PRN
Status: DISCONTINUED | OUTPATIENT
Start: 2023-04-17 | End: 2023-04-17 | Stop reason: HOSPADM

## 2023-04-17 RX ORDER — MIDAZOLAM HYDROCHLORIDE 1 MG/ML
INJECTION INTRAMUSCULAR; INTRAVENOUS
Status: DISCONTINUED
Start: 2023-04-17 | End: 2023-04-17 | Stop reason: WASHOUT

## 2023-04-17 RX ORDER — GLYCOPYRROLATE 0.2 MG/ML
INJECTION INTRAMUSCULAR; INTRAVENOUS
Status: DISCONTINUED | OUTPATIENT
Start: 2023-04-17 | End: 2023-04-17

## 2023-04-17 RX ORDER — ROPIVACAINE HYDROCHLORIDE 5 MG/ML
INJECTION, SOLUTION EPIDURAL; INFILTRATION; PERINEURAL
Status: DISPENSED
Start: 2023-04-17 | End: 2023-04-17

## 2023-04-17 RX ORDER — POLYETHYLENE GLYCOL 3350 17 G/17G
17 POWDER, FOR SOLUTION ORAL NIGHTLY
Status: DISCONTINUED | OUTPATIENT
Start: 2023-04-17 | End: 2023-04-18 | Stop reason: HOSPADM

## 2023-04-17 RX ORDER — ROPIVACAINE HYDROCHLORIDE 5 MG/ML
INJECTION, SOLUTION EPIDURAL; INFILTRATION; PERINEURAL
Status: DISCONTINUED | OUTPATIENT
Start: 2023-04-17 | End: 2023-04-17 | Stop reason: HOSPADM

## 2023-04-17 RX ORDER — ONDANSETRON 2 MG/ML
INJECTION INTRAMUSCULAR; INTRAVENOUS
Status: DISCONTINUED | OUTPATIENT
Start: 2023-04-17 | End: 2023-04-17

## 2023-04-17 RX ORDER — DOCUSATE SODIUM 100 MG/1
200 CAPSULE, LIQUID FILLED ORAL DAILY
Status: DISCONTINUED | OUTPATIENT
Start: 2023-04-18 | End: 2023-04-18 | Stop reason: HOSPADM

## 2023-04-17 RX ORDER — SODIUM CHLORIDE 9 MG/ML
INJECTION, SOLUTION INTRAVENOUS CONTINUOUS
Status: DISCONTINUED | OUTPATIENT
Start: 2023-04-17 | End: 2023-04-18 | Stop reason: HOSPADM

## 2023-04-17 RX ORDER — MORPHINE SULFATE 10 MG/ML
INJECTION INTRAMUSCULAR; INTRAVENOUS; SUBCUTANEOUS
Status: DISCONTINUED | OUTPATIENT
Start: 2023-04-17 | End: 2023-04-17 | Stop reason: HOSPADM

## 2023-04-17 RX ORDER — KETOROLAC TROMETHAMINE 30 MG/ML
INJECTION, SOLUTION INTRAMUSCULAR; INTRAVENOUS
Status: DISPENSED
Start: 2023-04-17 | End: 2023-04-17

## 2023-04-17 RX ORDER — AMOXICILLIN 250 MG
2 CAPSULE ORAL 2 TIMES DAILY
Status: DISCONTINUED | OUTPATIENT
Start: 2023-04-17 | End: 2023-04-18 | Stop reason: HOSPADM

## 2023-04-17 RX ORDER — ACETAMINOPHEN 500 MG
500 TABLET ORAL
Status: DISCONTINUED | OUTPATIENT
Start: 2023-04-17 | End: 2023-04-18 | Stop reason: HOSPADM

## 2023-04-17 RX ORDER — SODIUM CITRATE AND CITRIC ACID MONOHYDRATE 334; 500 MG/5ML; MG/5ML
30 SOLUTION ORAL ONCE
Status: COMPLETED | OUTPATIENT
Start: 2023-04-17 | End: 2023-04-17

## 2023-04-17 RX ORDER — BUPIVACAINE HYDROCHLORIDE AND EPINEPHRINE 2.5; 5 MG/ML; UG/ML
INJECTION, SOLUTION EPIDURAL; INFILTRATION; INTRACAUDAL; PERINEURAL
Status: DISCONTINUED | OUTPATIENT
Start: 2023-04-17 | End: 2023-04-17

## 2023-04-17 RX ORDER — ISOSORBIDE MONONITRATE 30 MG/1
30 TABLET, EXTENDED RELEASE ORAL DAILY
Status: DISCONTINUED | OUTPATIENT
Start: 2023-04-17 | End: 2023-04-17

## 2023-04-17 RX ORDER — FENTANYL CITRATE 50 UG/ML
INJECTION, SOLUTION INTRAMUSCULAR; INTRAVENOUS
Status: DISCONTINUED | OUTPATIENT
Start: 2023-04-17 | End: 2023-04-17

## 2023-04-17 RX ORDER — MORPHINE SULFATE 4 MG/ML
4 INJECTION, SOLUTION INTRAMUSCULAR; INTRAVENOUS
Status: ACTIVE | OUTPATIENT
Start: 2023-04-17 | End: 2023-04-18

## 2023-04-17 RX ORDER — HYDROMORPHONE HYDROCHLORIDE 2 MG/ML
0.2 INJECTION, SOLUTION INTRAMUSCULAR; INTRAVENOUS; SUBCUTANEOUS EVERY 5 MIN PRN
Status: DISCONTINUED | OUTPATIENT
Start: 2023-04-17 | End: 2023-04-17 | Stop reason: HOSPADM

## 2023-04-17 RX ORDER — TALC
6 POWDER (GRAM) TOPICAL NIGHTLY PRN
Status: DISCONTINUED | OUTPATIENT
Start: 2023-04-17 | End: 2023-04-18 | Stop reason: HOSPADM

## 2023-04-17 RX ORDER — NAPROXEN SODIUM 220 MG/1
81 TABLET, FILM COATED ORAL 2 TIMES DAILY
Status: DISCONTINUED | OUTPATIENT
Start: 2023-04-18 | End: 2023-04-18 | Stop reason: HOSPADM

## 2023-04-17 RX ADMIN — METFORMIN HYDROCHLORIDE 500 MG: 500 TABLET, FILM COATED ORAL at 04:04

## 2023-04-17 RX ADMIN — BUPIVACAINE HYDROCHLORIDE AND EPINEPHRINE BITARTRATE 30 ML: 2.5; .005 INJECTION, SOLUTION EPIDURAL; INFILTRATION; INTRACAUDAL; PERINEURAL at 08:04

## 2023-04-17 RX ADMIN — Medication 100 MCG: at 09:04

## 2023-04-17 RX ADMIN — DEXTROSE 2 G: 50 INJECTION, SOLUTION INTRAVENOUS at 09:04

## 2023-04-17 RX ADMIN — FAMOTIDINE 20 MG: 20 TABLET, FILM COATED ORAL at 08:04

## 2023-04-17 RX ADMIN — TRANEXAMIC ACID 1950 MG: 650 TABLET ORAL at 07:04

## 2023-04-17 RX ADMIN — ROCURONIUM BROMIDE 15 MG: 10 SOLUTION INTRAVENOUS at 09:04

## 2023-04-17 RX ADMIN — ACETAMINOPHEN 1000 MG: 500 TABLET, FILM COATED ORAL at 07:04

## 2023-04-17 RX ADMIN — CARVEDILOL 12.5 MG: 6.25 TABLET, FILM COATED ORAL at 08:04

## 2023-04-17 RX ADMIN — Medication 50 MCG: at 09:04

## 2023-04-17 RX ADMIN — PROPOFOL 125 MG: 10 INJECTION, EMULSION INTRAVENOUS at 08:04

## 2023-04-17 RX ADMIN — INSULIN ASPART 2 UNITS: 100 INJECTION, SOLUTION INTRAVENOUS; SUBCUTANEOUS at 08:04

## 2023-04-17 RX ADMIN — KETOROLAC TROMETHAMINE 10 MG: 10 TABLET, FILM COATED ORAL at 04:04

## 2023-04-17 RX ADMIN — SENNOSIDES AND DOCUSATE SODIUM 2 TABLET: 8.6; 5 TABLET ORAL at 08:04

## 2023-04-17 RX ADMIN — SODIUM CITRATE AND CITRIC ACID MONOHYDRATE 30 ML: 500; 334 SOLUTION ORAL at 07:04

## 2023-04-17 RX ADMIN — ACETAMINOPHEN 1000 MG: 10 INJECTION INTRAVENOUS at 06:04

## 2023-04-17 RX ADMIN — ISOSORBIDE MONONITRATE 30 MG: 30 TABLET, EXTENDED RELEASE ORAL at 08:04

## 2023-04-17 RX ADMIN — PHENYLEPHRINE HYDROCHLORIDE 25 MCG/MIN: 10 INJECTION INTRAVENOUS at 09:04

## 2023-04-17 RX ADMIN — GABAPENTIN 300 MG: 300 CAPSULE ORAL at 07:04

## 2023-04-17 RX ADMIN — ROCURONIUM BROMIDE 50 MG: 10 SOLUTION INTRAVENOUS at 08:04

## 2023-04-17 RX ADMIN — SUGAMMADEX 200 MG: 100 INJECTION, SOLUTION INTRAVENOUS at 09:04

## 2023-04-17 RX ADMIN — LIDOCAINE HYDROCHLORIDE 50 MG: 10 INJECTION, SOLUTION EPIDURAL; INFILTRATION; INTRACAUDAL; PERINEURAL at 08:04

## 2023-04-17 RX ADMIN — FENTANYL CITRATE 50 MCG: 50 INJECTION, SOLUTION INTRAMUSCULAR; INTRAVENOUS at 08:04

## 2023-04-17 RX ADMIN — ONDANSETRON HYDROCHLORIDE 4 MG: 2 SOLUTION INTRAMUSCULAR; INTRAVENOUS at 09:04

## 2023-04-17 RX ADMIN — GLYCOPYRROLATE 0.2 MG: 0.2 INJECTION INTRAMUSCULAR; INTRAVENOUS at 08:04

## 2023-04-17 RX ADMIN — MIDAZOLAM HYDROCHLORIDE 2 MG: 1 INJECTION, SOLUTION INTRAMUSCULAR; INTRAVENOUS at 08:04

## 2023-04-17 RX ADMIN — INSULIN ASPART 4 UNITS: 100 INJECTION, SOLUTION INTRAVENOUS; SUBCUTANEOUS at 04:04

## 2023-04-17 RX ADMIN — METOCLOPRAMIDE 10 MG: 5 INJECTION, SOLUTION INTRAMUSCULAR; INTRAVENOUS at 02:04

## 2023-04-17 RX ADMIN — KETOROLAC TROMETHAMINE 10 MG: 10 TABLET, FILM COATED ORAL at 11:04

## 2023-04-17 RX ADMIN — CEFAZOLIN 2 G: 2 INJECTION, POWDER, FOR SOLUTION INTRAMUSCULAR; INTRAVENOUS at 10:04

## 2023-04-17 RX ADMIN — GLYBURIDE 2.5 MG: 1.25 TABLET ORAL at 04:04

## 2023-04-17 RX ADMIN — ACETAMINOPHEN 500 MG: 500 TABLET, FILM COATED ORAL at 10:04

## 2023-04-17 RX ADMIN — FENTANYL CITRATE 50 MCG: 50 INJECTION, SOLUTION INTRAMUSCULAR; INTRAVENOUS at 09:04

## 2023-04-17 RX ADMIN — Medication 100 MCG: at 08:04

## 2023-04-17 RX ADMIN — SODIUM CHLORIDE, SODIUM GLUCONATE, SODIUM ACETATE, POTASSIUM CHLORIDE AND MAGNESIUM CHLORIDE: 526; 502; 368; 37; 30 INJECTION, SOLUTION INTRAVENOUS at 08:04

## 2023-04-17 RX ADMIN — SODIUM CHLORIDE, SODIUM GLUCONATE, SODIUM ACETATE, POTASSIUM CHLORIDE AND MAGNESIUM CHLORIDE 250 ML/HR: 526; 502; 368; 37; 30 INJECTION, SOLUTION INTRAVENOUS at 07:04

## 2023-04-17 RX ADMIN — CEFAZOLIN 2 G: 2 INJECTION, POWDER, FOR SOLUTION INTRAMUSCULAR; INTRAVENOUS at 04:04

## 2023-04-17 RX ADMIN — METOCLOPRAMIDE 10 MG: 5 INJECTION, SOLUTION INTRAMUSCULAR; INTRAVENOUS at 08:04

## 2023-04-17 RX ADMIN — POLYETHYLENE GLYCOL 3350 17 G: 17 POWDER, FOR SOLUTION ORAL at 08:04

## 2023-04-17 RX ADMIN — SODIUM CHLORIDE: 9 INJECTION, SOLUTION INTRAVENOUS at 11:04

## 2023-04-17 NOTE — ANESTHESIA PREPROCEDURE EVALUATION
04/17/2023  Eve Altman is a 78 y.o., female.    Eve Altman    Pre-op Diagnosis: Primary osteoarthritis of right knee [M17.11]    Procedure(s):  ROBOTIC ARTHROPLASTY, KNEE, TOTAL     Review of patient's allergies indicates:   Allergen Reactions    Adhesive Dermatitis    Niacin Shortness Of Breath     Other reaction(s): UNKNOWN  Redness and SOB    Lisinopril Other (See Comments)    Latex      Other reaction(s): UNKNOWN       Current Outpatient Medications   Medication Instructions    aspirin (ECOTRIN) 81 mg, Oral, Daily    blood sugar diagnostic Strp To check BG 1 time daily, to use with insurance preferred meter    blood-glucose meter kit To check BG 1 time daily, to use with insurance preferred meter    carvediloL (COREG) 12.5 mg, Oral, 2 times daily    clopidogreL (PLAVIX) 75 mg, Oral, Daily    FLAXSEED OIL-OMEGA 3,6,9 ORAL 1 capsule, Oral, 3 times daily    glyBURIDE-metformin (GLUCOVANCE) 2.5-500 mg per tablet 1 tablet, Oral, 2 times daily with meals    isosorbide mononitrate (IMDUR) 30 mg, Oral, Daily    lancets Misc To check BG 1 time daily, to use with insurance preferred meter    nitroGLYCERIN (NITROSTAT) 0.4 MG SL tablet TAKE 1 TABLET (SUBLINGUAL) ONCE EVERY 5 MIN FOR 3 DOSES FOR CHEST PAIN IF NOT RELIEVED GO TO ER.    pantoprazole (PROTONIX) 40 mg, Oral, Daily    red yeast rice 600 mg Cap 1 tablet, Oral, Daily    TRUEPLUS LANCETS 28 gauge Misc 1 lancet, Topical (Top), Daily    zinc acetate 25 mg, Oral, Daily   LAST PLAVIX 4/12/2023    HI TOTAL KNEE ARTHROPLASTY [41814] (ROBOTIC ARTHROPLASTY, K*    Past Medical History:   Diagnosis Date    Chronic GERD     Coronary artery disease     Essential (primary) hypertension     H/O heart artery stent     HLD (hyperlipidemia)     Hypothyroidism, unspecified     IC (interstitial cystitis)     Osteoarthritis of right knee      Osteopenia     Stress incontinence (female) (male)     Type 2 diabetes mellitus without complications        Past Surgical History:   Procedure Laterality Date    Bone and/or joint imaging; whole body   06/24/2019    BUNIONECTOMY      CATARACT EXTRACTION W/  INTRAOCULAR LENS IMPLANT Bilateral     CHOLECYSTECTOMY      COLONOSCOPY  03/11/2013    CORONARY ANGIOPLASTY WITH STENT PLACEMENT      x 3    ESOPHAGEAL DILATION      ESOPHAGOGASTRODUODENOSCOPY  04/16/2019    ESOPHAGOGASTRODUODENOSCOPY  03/04/2013    HYSTERECTOMY      LUMBAR SPINE SURGERY  07/24/2019    OPEN REDUCTION AND INTERNAL FIXATION (ORIF) OF INJURY OF SHOULDER Right     Suspension of bladder      x2    TUBAL LIGATION  1975     Lab Results   Component Value Date    WBC 7.0 03/30/2023    HGB 11.0 (L) 03/30/2023    HCT 37.5 03/30/2023    MCV 75.3 (L) 03/30/2023     03/30/2023   BMP  Lab Results   Component Value Date     03/30/2023    K 4.4 03/30/2023    CO2 27 03/30/2023    BUN 15.8 03/30/2023    CREATININE 1.02 03/30/2023    CALCIUM 9.8 03/30/2023    EGFRNONAA >60 04/11/2022    CXR - NACPD          Pre-op Assessment    I have reviewed the Patient Summary Reports.    I have reviewed the NPO Status.   I have reviewed the Medications.     Review of Systems  Anesthesia Hx:  No problems with previous Anesthesia  Denies Family Hx of Anesthesia complications.   Denies Personal Hx of Anesthesia complications.   Social:  Non-Smoker    Cardiovascular:   Exercise tolerance: good Hypertension CAD  CABG/stent   Denies Angina.  Denies Orthopnea.  Denies PND.  Denies DOAN.  Functional Capacity good / => 4 METS    Hepatic/GI:   GERD    Musculoskeletal:   Arthritis     Neurological:   Denies TIA. Denies CVA.    Endocrine:   Diabetes Hypothyroidism    Psych:  Psychiatric Normal           Physical Exam  General: Well nourished, Alert and Oriented    Airway:  Mallampati: III   Mouth Opening: Normal  TM Distance: Normal  Tongue: Normal  Neck ROM:  Normal ROM    Dental:  Intact    Chest/Lungs:  Clear to auscultation    Heart:  Rate: Normal  Rhythm: Regular Rhythm  No pretibial edema  No carotid bruits      Anesthesia Plan  Type of Anesthesia, risks & benefits discussed:    Anesthesia Type: Gen ETT  Intra-op Monitoring Plan: Standard ASA Monitors  Post Op Pain Control Plan: multimodal analgesia  Induction:  IV  Airway Plan: Direct, Post-Induction  Informed Consent: Informed consent signed with the Patient and all parties understand the risks and agree with anesthesia plan.  All questions answered. Patient consented to blood products? Yes  ASA Score: 3  Day of Surgery Review of History & Physical: H&P Update referred to the surgeon/provider.  Anesthesia Plan Notes: Adductor Canal Block for Postop Pain Relief at surgeon's request  Risks including sensory neuropathy, failed block, & seizure explained (pt accepts)    Ready For Surgery From Anesthesia Perspective.     .

## 2023-04-17 NOTE — ANESTHESIA POSTPROCEDURE EVALUATION
Anesthesia Post Evaluation    Patient: Eve Altman    Procedure(s) Performed: Procedure(s) (LRB):  ROBOTIC ARTHROPLASTY, KNEE, TOTAL (Right)    Final Anesthesia Type: regional      Patient location during evaluation: OPS  Patient participation: Yes- Able to Participate  Level of consciousness: awake and alert  Post-procedure vital signs: reviewed and stable  Pain management: adequate  Airway patency: patent  NOEMI mitigation strategies: Use of major conduction anesthesia (spinal/epidural) or peripheral nerve block and Multimodal analgesia  PONV status at discharge: No PONV  Anesthetic complications: no      Cardiovascular status: hemodynamically stable  Respiratory status: unassisted, spontaneous ventilation and room air  Hydration status: euvolemic  Follow-up not needed.  Comments: Stable peripheral blockade           Vitals Value Taken Time   /79 04/17/23 1118   Temp 36.3 °C (97.4 °F) 04/17/23 1118   Pulse 86 04/17/23 1118   Resp 17 04/17/23 1111   SpO2 100 % 04/17/23 1118   Vitals shown include unvalidated device data.      Event Time   Out of Recovery 11:13:12         Pain/Osiris Score: Pain Rating Prior to Med Admin: 5 (4/17/2023  7:50 AM)  Osiris Score: 9 (4/17/2023 11:10 AM)

## 2023-04-17 NOTE — ANESTHESIA PROCEDURE NOTES
Peripheral Block    Patient location during procedure: holding area   Block not for primary anesthetic.  Reason for block: at surgeon's request and post-op pain management   Post-op Pain Location: RIGHT KNEE PAIN   Start time: 4/17/2023 8:34 AM  Timeout: 4/17/2023 8:34 AM   End time: 4/17/2023 8:38 AM    Staffing  Authorizing Provider: Agapito Merino MD  Performing Provider: Agapito Merino MD    Preanesthetic Checklist  Completed: patient identified, IV checked, site marked, risks and benefits discussed, surgical consent, monitors and equipment checked, pre-op evaluation and timeout performed  Peripheral Block  Patient position: supine  Prep: ChloraPrep  Patient monitoring: heart rate, continuous pulse ox and frequent blood pressure checks  Block type: adductor canal  Laterality: right  Injection technique: single shot  Needle  Needle type: Stimuplex   Needle gauge: 20 G  Needle length: 4 in  Needle localization: ultrasound guidance and nerve stimulator   -ultrasound image captured on disc.  Assessment  Injection assessment: negative aspiration, negative parasthesia and local visualized surrounding nerve  Paresthesia pain: none  Heart rate change: no  Slow fractionated injection: yes  Pain Tolerance: comfortable throughout block  Medications:    Medications: bupivacaine 0.25%-EPINEPHrine (PF) 1:200,000 injection - Intrathecal   30 mL - 4/17/2023 8:38:00 AM    Additional Notes  Block requested by Dr. Allen in his preop orders (anesthesia to evaluate for block for postoperative pain relief) / on surgery schedule    Site Prepped: RightThigh   Prep:  Chloraprep allowed to dry completely as to  guidelines  Local to Skin & Subq: 1.0 ml with 27 ga 1.5 inch needle     BLOCK NEEDLE:  ROJO [id number] (20 ga 4 inches)    Ultrasound was utilized to visualize the nerve bundle or sheath, as well as, to confirm placement of the needle and deposition of the local anesthetic    Local Anesthetic: 0.25% Bupivacaine  with epi 1/200,000  DOSING:  incremental dosing (1+4+1+4+5+5 ml) with aspiration between each incremental dose (transient paresthesia, needle repositioned)                   at Saphenous N in adductor canal  Pressure held over injection site X 1 min  Complications noted:  none apparent.    Narrative  Under ultrasound guidance a ROJO [id number] stimulating needle was inserted & placed in close proximity to the Saphenous N in the Right Adductor Canal  Injections were made in close proximity  to the R  saphenous Nerve  Ultrasound was used to visualize the spread of the anesthetic around the nerve    The nerve appeared anatomically  normal.    There were no apparent pathological findings

## 2023-04-17 NOTE — NURSING
Nurses Note -- 4 Eyes      4/17/2023   1:08 PM      Skin assessed during: Admit      [x] No Pressure Injuries Present    []Prevention Measures Documented      [] Yes- Altered Skin Integrity Present or Discovered   [] LDA Added if Not in Epic (Describe Wound)   [] New Altered Skin Integrity was Present on Admit and Documented in LDA   [] Wound Image Taken    Wound Care Consulted? No    Attending Nurse:  Nazia Bloom RN     Second RN/Staff Member:  SWATHI Chi

## 2023-04-17 NOTE — OP NOTE
OPERATIVE REPORT    Patient: Eve Altman   : 1945    MRN: [id number]  Date: 2023      Surgeon: Yung Allen MD  Assistant: Tami Altamn NP  Assistant was essential, part of the procedure including positioning, holding multiple retractors, deep hardware placement and deep closure.    Preoperative Diagnosis:  R knee primary osteoarthritis  Postoperative Diagnosis: Same  Procedure:  R WALESKA TKA (61765)  Anesthesiologist: Agapito Merino MD  OR Staff: Circulator: Abimael Garcia RN  Nurse Practitioner: TORRI Mack  Scrub Person: Juan Carlos Santanaenter  Implants:   Implant Name Type Inv. Item Serial No.  Lot No. LRB No. Used Action   PIN BONE [id number] - [id number]  PIN BONE [id number]  [employer].  Right 1 Implanted and Explanted   COMP FEM CR BEAD SZ 3 RIGHT - [id number]  COMP FEM CR BEAD SZ 3 RIGHT  [employer] [id number] Right 1 Implanted   INSERT TIBIAL CR X3 9MM SZ 3 - [id number]  INSERT TIBIAL CR X3 9MM SZ 3  [employer] [id number] Right 1 Implanted   BASEPLATE TRITANIUM 44MM SZ 3 - [id number]  BASEPLATE TRITANIUM 44MM SZ 3  [employer] [id number] Right 1 Implanted     EBL: 50 cc  Complications: None  Disposition: To PACU, stable    Indications: Eve Altman is a 78 y.o. female presenting with R knee pain.  Patient had tried and failed all conservative measures including injections, activity modification, NSAIDs, and home exercise program.  Risks, benefits, and alternatives discussed with regards to right total knee arthroplasty.  All questions answered to patients satisfaction, no guarantees made.  Despite these risks, the patient agreed to proceed with surgical intervention.     Procedure Note:  The patient was brought back to the OR and placed supine on the OR table. After successful induction of anesthesia by anesthesia staff, all bony prominences were padded appropriately.  Tourniquet placed to the right upper thigh.  Right knee prepped and draped in  normal sterile fashion. At this time, a time-out was performed, with the correct patient, site, and procedure identified.  Preoperative antibiotics were verified as administered.     Leg was exsanguinated using Esmarch tourniquet and tourniquet was inflated to 250 mmHg.  Standard midline parapatellar incision was performed taken through the skin and subcutaneous tissue.  A fresh knife was used to make an arthrotomy midline parapatellar approach.  Proximal medial tibia retinaculam was released from the proximal medial tibial plateau.  Small resection of prepatellar fat pad was performed.  Knee flexed up.  Schanz pins attached to the distal femur as well as distal tibia.  Datumate robotic arrays attached to both sets of pins.  Checkpoints attached to distal femur and proximal tibia.  Registration performed initially with hip center followed by ankle center.  And registration of both the distal femur and proximal tibia performed using Marino protocol.  We then checked joint balance and ligamentous tension in both extension as well as 90° of flexion.  Adjusted femoral and tibial implants to get appropriate gaps in both extension as well as flexion.  After we were satisfied with implant position as well as volume resection, Datumate robot was brought in.  Femoral cuts performed followed by tibial cuts.  Care performed to protect all necessary soft tissue structures during bony resection.  Bony fragments and resected and removed from the knee.  Medial and lateral meniscus resected.  Tibial tray placed in the position and pinned.  Trial liner placed in position.  Trial femoral component placed in position and adjusted medially and laterally to the appropriate position.  Knee taken to full range of motion.  Came to full extension.  Excellent flexion greater than 120°.  Stable throughout his range of motion with no extension instability or flexion instability.  Patella tracked well.    Patella was everted.  Good cartilage covering  noted.  It was not resected. Femoral lugs drilled and the femoral trial removed.  We then punched the tibia with a tibial tower.  Tibial trial then removed.  Implants opened.  Tibia component implanted, followed by the polyethylene liner.  Femoral component then implanted.  At this point brought the knee to full range of motion yet again.  Again excellent range of motion from full extension greater than 120° of flexion was noted with no instability at full flexion, mid flexion and extension.  Checkpoints removed as well as pins.    The tourniquet was dropped.  All bleeders were coagulated.  Injection of joint cocktail periarticularly.  Copious irrigation with normal saline performed along with Betadine lavage followed by more normal saline.  We then turned our attention towards closure.  Arthrotomy closed with #1 Vicryl as well as #1 Stratafix suture.  Subcutaneous tissue closed with 2-0 Monocryl.  Followed by skin closure.    Patient arose from anesthesia without any issue and was then subsequently transferred to to PACU in a stable condition.    All sponge and needle counts were correct at the end of the case.  I was present and participated in all aspects of the procedure.    Prognosis:  The patient will be weight-bearing as tolerated to the right lower extremity with range of motion with physical therapy.  Patient will receive DVT prophylaxis .   plan discharge home versus a facility once the patient is stable with physical therapy guidelines.      This note/OR report was created with the assistance of  voice recognition software or phone  dictation.  There may be transcription errors as a result of using this technology however minimal. Effort has been made to assure accuracy of transcription but any obvious errors or omissions should be clarified with the author of the document.

## 2023-04-17 NOTE — PLAN OF CARE
Problem: Adult Inpatient Plan of Care  Goal: Plan of Care Review  Outcome: Ongoing, Progressing  Goal: Patient-Specific Goal (Individualized)  Outcome: Ongoing, Progressing  Goal: Absence of Hospital-Acquired Illness or Injury  Outcome: Ongoing, Progressing  Goal: Optimal Comfort and Wellbeing  Outcome: Ongoing, Progressing  Goal: Readiness for Transition of Care  Outcome: Ongoing, Progressing     Problem: Diabetes Comorbidity  Goal: Blood Glucose Level Within Targeted Range  Outcome: Ongoing, Progressing     Problem: Infection  Goal: Absence of Infection Signs and Symptoms  Outcome: Ongoing, Progressing     Problem: Adjustment to Surgery (Knee Arthroplasty)  Goal: Optimal Coping  Outcome: Ongoing, Progressing     Problem: Bleeding (Knee Arthroplasty)  Goal: Absence of Bleeding  Outcome: Ongoing, Progressing     Problem: Bowel Motility Impaired (Knee Arthroplasty)  Goal: Effective Bowel Elimination  Outcome: Ongoing, Progressing     Problem: Fluid and Electrolyte Imbalance (Knee Arthroplasty)  Goal: Fluid and Electrolyte Balance  Outcome: Ongoing, Progressing     Problem: Functional Ability Impaired (Knee Arthroplasty)  Goal: Optimal Functional Ability  Outcome: Ongoing, Progressing     Problem: Infection (Knee Arthroplasty)  Goal: Absence of Infection Signs and Symptoms  Outcome: Ongoing, Progressing     Problem: Neurovascular Compromise (Knee Arthroplasty)  Goal: Intact Neurovascular Status  Outcome: Ongoing, Progressing     Problem: Ongoing Anesthesia Effects (Knee Arthroplasty)  Goal: Anesthesia/Sedation Recovery  Outcome: Ongoing, Progressing     Problem: Pain (Knee Arthroplasty)  Goal: Acceptable Pain Control  Outcome: Ongoing, Progressing     Problem: Postoperative Nausea and Vomiting (Knee Arthroplasty)  Goal: Nausea and Vomiting Relief  Outcome: Ongoing, Progressing     Problem: Postoperative Urinary Retention (Knee Arthroplasty)  Goal: Effective Urinary Elimination  Outcome: Ongoing, Progressing      Problem: Respiratory Compromise (Knee Arthroplasty)  Goal: Effective Oxygenation and Ventilation  Outcome: Ongoing, Progressing     Problem: Hypertension Comorbidity  Goal: Blood Pressure in Desired Range  Outcome: Ongoing, Progressing

## 2023-04-17 NOTE — PT/OT/SLP EVAL
Physical Therapy Evaluation    Patient Name:  Eve Altman   MRN:  [id number]    Recommendations:     Discharge Recommendations: outpatient OT, home health PT   Discharge Equipment Recommendations: none   Barriers to discharge: None    Assessment:     Eve Altman is a 78 y.o. female admitted with a medical diagnosis of Primary osteoarthritis of right knee.  She presents with the following impairments/functional limitations: impaired functional mobility, decreased lower extremity function, pain .    Rehab Prognosis: Good; patient would benefit from acute skilled PT services to address these deficits and reach maximum level of function.    Recent Surgery: Procedure(s) (LRB):  ROBOTIC ARTHROPLASTY, KNEE, TOTAL (Right) Day of Surgery    Plan:     During this hospitalization, patient to be seen BID to address the identified rehab impairments via gait training, therapeutic activities, therapeutic exercises and progress toward the following goals:    Plan of Care Expires:  04/24/23    Subjective     Chief Complaint:   Patient/Family Comments/goals:   Pain/Comfort:  Pain Rating 1: 0/10    Patients cultural, spiritual, Judaism conflicts given the current situation:      Living Environment:pt performed a prehab program  Pt lives with her  in [facility] with 5 steps and rail   Prior to admission, patients level of function was ind  Equipment used at home: walker, rolling, cane, straight.  DME owned (not currently used):   Upon discharge, patient will have assistance from spouse    Objective:     Communicated with nu rse prior to session.  Patient found supine with peripheral IV  upon PT entry to room.    General Precautions: Standard, fall  Orthopedic Precautions:RLE weight bearing as tolerated   Braces: N/A  Respiratory Status: Room air    Exams:  LLE ROM: WFL  LLE Strength: WFL    (In degrees) AROM PROM   R knee flexion 90    R knee extension 0       Functional Mobility:  Transfers:     Sit to Stand:  stand by  assistance with rolling walker  Bed to Chair: stand by assistance with  rolling walker  using  Step Transfer  Gait: amb 150ft with a rw sba/cga       AM-PAC 6 CLICK MOBILITY  Total Score:        Treatment & Education:      Patient left supine with all lines intact and call button in reach.    GOALS:   Multidisciplinary Problems       Physical Therapy Goals          Problem: Physical Therapy    Goal Priority Disciplines Outcome Goal Variances Interventions   Physical Therapy Goal     PT, PT/OT Ongoing, Progressing     Description: Pt will improve functional independence by performing:    Bed mobility: SBA  Sit to stand: SBA  Car Transfer: SBA with rolling walker  Ambulation x 200'  feet with SBA and rolling walker  1 Step (Curb): Min A  and rolling walker  3 Steps: Min A  and R HR  right knee AROM flexion (in degrees): 90  right knee AROM extension (in degrees): 0   Independent with total knee HEP                          History:     Past Medical History:   Diagnosis Date    Chronic GERD     Coronary artery disease     Essential (primary) hypertension     H/O heart artery stent     HLD (hyperlipidemia)     Hypothyroidism, unspecified     IC (interstitial cystitis)     Osteoarthritis of right knee     Osteopenia     Stress incontinence (female) (male)     Type 2 diabetes mellitus without complications        Past Surgical History:   Procedure Laterality Date    Bone and/or joint imaging; whole body   06/24/2019    BUNIONECTOMY      CATARACT EXTRACTION W/  INTRAOCULAR LENS IMPLANT Bilateral     CHOLECYSTECTOMY      COLONOSCOPY  03/11/2013    CORONARY ANGIOPLASTY WITH STENT PLACEMENT      x 3    ESOPHAGEAL DILATION      ESOPHAGOGASTRODUODENOSCOPY  04/16/2019    ESOPHAGOGASTRODUODENOSCOPY  03/04/2013    HYSTERECTOMY      LUMBAR SPINE SURGERY  07/24/2019    OPEN REDUCTION AND INTERNAL FIXATION (ORIF) OF INJURY OF SHOULDER Right     Suspension of bladder      x2    TUBAL LIGATION  1975       Time Tracking:     PT Received  On:    PT Start Time: 1500     PT Stop Time: 1535  PT Total Time (min): 35 min     Billable Minutes: Evaluation 35      04/17/2023

## 2023-04-17 NOTE — ANESTHESIA PROCEDURE NOTES
Intubation    Date/Time: 4/17/2023 8:51 AM  Performed by: Kathleen Hoover CRNA  Authorized by: Agapito Merino MD     Intubation:     Induction:  Intravenous    Intubated:  Postinduction    Mask Ventilation:  Easy mask    Attempts:  1    Attempted By:  CRNA    Method of Intubation:  Direct    Blade:  Beard 2    Laryngeal View Grade: Grade I - full view of cords      Difficult Airway Encountered?: No      Complications:  None    Airway Device:  Oral endotracheal tube    Airway Device Size:  7.0    Style/Cuff Inflation:  Cuffed (inflated to minimal occlusive pressure)    Inflation Amount (mL):  6    Tube secured:  21    Secured at:  The lips    Placement Verified By:  Capnometry    Complicating Factors:  None    Findings Post-Intubation:  BS equal bilateral and atraumatic/condition of teeth unchanged

## 2023-04-17 NOTE — PLAN OF CARE
Problem: Physical Therapy  Goal: Physical Therapy Goal  Description: Pt will improve functional independence by performing:    Bed mobility: SBA  Sit to stand: SBA  Car Transfer: SBA with rolling walker  Ambulation x 200'  feet with SBA and rolling walker  1 Step (Curb): Min A  and rolling walker  3 Steps: Min A  and R HR  right knee AROM flexion (in degrees): 90  right knee AROM extension (in degrees): 0   Independent with total knee HEP     Outcome: Ongoing, Progressing

## 2023-04-17 NOTE — TRANSFER OF CARE
"Anesthesia Transfer of Care Note    Patient: Eve Altman    Procedure(s) Performed: Procedure(s) (LRB):  ROBOTIC ARTHROPLASTY, KNEE, TOTAL (Right)    Patient location: PACU    Anesthesia Type: general    Transport from OR: Transported from OR on 6-10 L/min O2 by face mask with adequate spontaneous ventilation    Post pain: adequate analgesia    Post assessment: no apparent anesthetic complications    Post vital signs: stable    Level of consciousness: sedated    Nausea/Vomiting: no nausea/vomiting    Complications: none    Transfer of care protocol was followed      Last vitals:   Visit Vitals  /69   Pulse 89   Temp 36.1 °C (96.9 °F) (Tympanic)   Resp 20   Ht 5' 1" (1.549 m)   Wt 78.4 kg (172 lb 13.5 oz)   SpO2 96%   Breastfeeding No   BMI 32.66 kg/m²     "

## 2023-04-17 NOTE — OR NURSING
0826  procedure time out performed for rt adductor canal block, all agree  0834  started  0838  u/s guided rt adductor canal block completed, pt emi well, vss, resp full and regular, continuous monitoring.

## 2023-04-18 VITALS
RESPIRATION RATE: 18 BRPM | WEIGHT: 172.81 LBS | BODY MASS INDEX: 32.63 KG/M2 | OXYGEN SATURATION: 98 % | HEART RATE: 87 BPM | DIASTOLIC BLOOD PRESSURE: 66 MMHG | SYSTOLIC BLOOD PRESSURE: 119 MMHG | TEMPERATURE: 98 F | HEIGHT: 61 IN

## 2023-04-18 LAB
ANION GAP SERPL CALC-SCNC: 8 MEQ/L
BUN SERPL-MCNC: 14.5 MG/DL (ref 9.8–20.1)
CALCIUM SERPL-MCNC: 8.9 MG/DL (ref 8.4–10.2)
CHLORIDE SERPL-SCNC: 101 MMOL/L (ref 98–107)
CO2 SERPL-SCNC: 27 MMOL/L (ref 23–31)
CREAT SERPL-MCNC: 1.09 MG/DL (ref 0.55–1.02)
CREAT/UREA NIT SERPL: 13
ERYTHROCYTE [DISTWIDTH] IN BLOOD BY AUTOMATED COUNT: 15.8 % (ref 11.5–17)
GFR SERPLBLD CREATININE-BSD FMLA CKD-EPI: 52 MLS/MIN/1.73/M2
GLUCOSE SERPL-MCNC: 176 MG/DL (ref 82–115)
HCT VFR BLD AUTO: 28.3 % (ref 37–47)
HGB BLD-MCNC: 8.6 G/DL (ref 12–16)
MCH RBC QN AUTO: 22.3 PG (ref 27–31)
MCHC RBC AUTO-ENTMCNC: 30.4 G/DL (ref 33–36)
MCV RBC AUTO: 73.5 FL (ref 80–94)
NRBC BLD AUTO-RTO: 0 %
PLATELET # BLD AUTO: 271 X10(3)/MCL (ref 130–400)
PMV BLD AUTO: 10.2 FL (ref 7.4–10.4)
POCT GLUCOSE: 131 MG/DL (ref 70–110)
POCT GLUCOSE: 177 MG/DL (ref 70–110)
POTASSIUM SERPL-SCNC: 3.7 MMOL/L (ref 3.5–5.1)
RBC # BLD AUTO: 3.85 X10(6)/MCL (ref 4.2–5.4)
SODIUM SERPL-SCNC: 136 MMOL/L (ref 136–145)
WBC # SPEC AUTO: 15.1 X10(3)/MCL (ref 4.5–11.5)

## 2023-04-18 PROCEDURE — 80048 BASIC METABOLIC PNL TOTAL CA: CPT | Performed by: ORTHOPAEDIC SURGERY

## 2023-04-18 PROCEDURE — 97530 THERAPEUTIC ACTIVITIES: CPT | Mod: CQ

## 2023-04-18 PROCEDURE — 94761 N-INVAS EAR/PLS OXIMETRY MLT: CPT

## 2023-04-18 PROCEDURE — 97116 GAIT TRAINING THERAPY: CPT | Mod: CQ

## 2023-04-18 PROCEDURE — 25000003 PHARM REV CODE 250: Performed by: NURSE PRACTITIONER

## 2023-04-18 PROCEDURE — 94799 UNLISTED PULMONARY SVC/PX: CPT

## 2023-04-18 PROCEDURE — 63600175 PHARM REV CODE 636 W HCPCS: Performed by: ORTHOPAEDIC SURGERY

## 2023-04-18 PROCEDURE — 85027 COMPLETE CBC AUTOMATED: CPT | Performed by: ORTHOPAEDIC SURGERY

## 2023-04-18 PROCEDURE — 25000003 PHARM REV CODE 250: Performed by: ORTHOPAEDIC SURGERY

## 2023-04-18 PROCEDURE — 97110 THERAPEUTIC EXERCISES: CPT | Mod: CQ

## 2023-04-18 RX ORDER — FAMOTIDINE 20 MG/1
20 TABLET, FILM COATED ORAL DAILY
Status: DISCONTINUED | OUTPATIENT
Start: 2023-04-18 | End: 2023-04-18 | Stop reason: HOSPADM

## 2023-04-18 RX ORDER — TRAMADOL HYDROCHLORIDE 50 MG/1
25 TABLET ORAL EVERY 4 HOURS PRN
Qty: 21 TABLET | Refills: 0 | Status: SHIPPED | OUTPATIENT
Start: 2023-04-18 | End: 2023-04-25

## 2023-04-18 RX ORDER — POLYETHYLENE GLYCOL 3350 17 G/17G
17 POWDER, FOR SOLUTION ORAL DAILY
Qty: 14 EACH
Start: 2023-04-18 | End: 2023-05-02

## 2023-04-18 RX ORDER — CEFADROXIL 500 MG/1
500 CAPSULE ORAL EVERY 12 HOURS
Status: DISCONTINUED | OUTPATIENT
Start: 2023-04-18 | End: 2023-04-18 | Stop reason: HOSPADM

## 2023-04-18 RX ORDER — ASPIRIN 81 MG/1
81 TABLET ORAL DAILY
Refills: 0
Start: 2023-04-18 | End: 2023-10-02

## 2023-04-18 RX ORDER — METHOCARBAMOL 750 MG/1
750 TABLET, FILM COATED ORAL EVERY 6 HOURS PRN
Status: DISCONTINUED | OUTPATIENT
Start: 2023-04-18 | End: 2023-04-18 | Stop reason: HOSPADM

## 2023-04-18 RX ORDER — METHOCARBAMOL 750 MG/1
750 TABLET, FILM COATED ORAL EVERY 6 HOURS PRN
Qty: 56 TABLET | Refills: 0 | Status: SHIPPED | OUTPATIENT
Start: 2023-04-18 | End: 2023-05-02

## 2023-04-18 RX ORDER — METOCLOPRAMIDE HYDROCHLORIDE 5 MG/ML
5 INJECTION INTRAMUSCULAR; INTRAVENOUS
Status: DISCONTINUED | OUTPATIENT
Start: 2023-04-18 | End: 2023-04-18 | Stop reason: HOSPADM

## 2023-04-18 RX ORDER — CEFADROXIL 500 MG/1
500 CAPSULE ORAL EVERY 12 HOURS
Qty: 14 CAPSULE | Refills: 0 | Status: SHIPPED | OUTPATIENT
Start: 2023-04-18 | End: 2023-04-25

## 2023-04-18 RX ORDER — ACETAMINOPHEN 500 MG
500 TABLET ORAL EVERY 4 HOURS
Qty: 84 TABLET | Refills: 0 | Status: SHIPPED | OUTPATIENT
Start: 2023-04-18 | End: 2023-05-02

## 2023-04-18 RX ADMIN — INSULIN ASPART 2 UNITS: 100 INJECTION, SOLUTION INTRAVENOUS; SUBCUTANEOUS at 05:04

## 2023-04-18 RX ADMIN — ACETAMINOPHEN 500 MG: 500 TABLET, FILM COATED ORAL at 02:04

## 2023-04-18 RX ADMIN — DOCUSATE SODIUM 200 MG: 100 CAPSULE, LIQUID FILLED ORAL at 05:04

## 2023-04-18 RX ADMIN — METHOCARBAMOL 750 MG: 750 TABLET ORAL at 08:04

## 2023-04-18 RX ADMIN — METOCLOPRAMIDE 10 MG: 5 INJECTION, SOLUTION INTRAMUSCULAR; INTRAVENOUS at 02:04

## 2023-04-18 RX ADMIN — ASPIRIN 81 MG CHEWABLE TABLET 81 MG: 81 TABLET CHEWABLE at 08:04

## 2023-04-18 RX ADMIN — ACETAMINOPHEN 500 MG: 500 TABLET, FILM COATED ORAL at 05:04

## 2023-04-18 RX ADMIN — SENNOSIDES AND DOCUSATE SODIUM 2 TABLET: 8.6; 5 TABLET ORAL at 08:04

## 2023-04-18 RX ADMIN — CEFAZOLIN 2 G: 2 INJECTION, POWDER, FOR SOLUTION INTRAMUSCULAR; INTRAVENOUS at 04:04

## 2023-04-18 RX ADMIN — METHOCARBAMOL 750 MG: 750 TABLET ORAL at 02:04

## 2023-04-18 RX ADMIN — KETOROLAC TROMETHAMINE 10 MG: 10 TABLET, FILM COATED ORAL at 11:04

## 2023-04-18 RX ADMIN — GLYBURIDE 2.5 MG: 1.25 TABLET ORAL at 08:04

## 2023-04-18 RX ADMIN — ACETAMINOPHEN 500 MG: 500 TABLET, FILM COATED ORAL at 11:04

## 2023-04-18 RX ADMIN — METOCLOPRAMIDE 5 MG: 5 INJECTION, SOLUTION INTRAMUSCULAR; INTRAVENOUS at 08:04

## 2023-04-18 RX ADMIN — KETOROLAC TROMETHAMINE 10 MG: 10 TABLET, FILM COATED ORAL at 05:04

## 2023-04-18 RX ADMIN — METFORMIN HYDROCHLORIDE 500 MG: 500 TABLET, FILM COATED ORAL at 08:04

## 2023-04-18 NOTE — PT/OT/SLP PROGRESS
Physical Therapy Treatment    Patient Name:  Eve Altman   MRN:  [id number]    Recommendations:     Discharge Recommendations: outpatient OT, home health PT  Discharge Equipment Recommendations: none  Barriers to discharge: None    Assessment:     Eve Altman is a 78 y.o. female admitted with a medical diagnosis of Primary osteoarthritis of right knee.  She presents with the following impairments/functional limitations: weakness, decreased lower extremity function, pain, decreased ROM, orthopedic precautions, impaired muscle length, impaired functional mobility .    Rehab Prognosis: Good; patient would benefit from acute skilled PT services to address these deficits and reach maximum level of function.    Recent Surgery: Procedure(s) (LRB):  ROBOTIC ARTHROPLASTY, KNEE, TOTAL (Right) 1 Day Post-Op    Plan:     During this hospitalization, patient to be seen BID to address the identified rehab impairments via gait training, therapeutic activities, therapeutic exercises and progress toward the following goals:    Plan of Care Expires:  04/24/23    Subjective     Chief Complaint: none   Patient/Family Comments/goals: to go home today   Pain/Comfort:  Pain Rating 1: 2/10  Location - Side 1: Right  Location 1: knee  Pain Addressed 1: Pre-medicate for activity      Objective:     Communicated with nursing  prior to session.  Patient found HOB elevated with Other (comments) (in bed  present) upon PT entry to room.     General Precautions: Standard, fall  Orthopedic Precautions: RLE weight bearing as tolerated  Braces: N/A  Respiratory Status: Room air     Functional Mobility:  Bed Mobility:     Supine to Sit: independence  Transfers:     Sit to Stand:  modified independence with rolling walker  Bed to Chair: modified independence with  rolling walker  using  Step Transfer  Toilet Transfer: modified independence with  rolling walker  using  Step Transfer and post void while in restroom assisted with dressing set  "up   Gait: pt amb use of rw rtle wbat 450ft mod I step through gt pattern good rhona   Stairs:  Pt ascended/descended 6 stair(s) and 4" curb step with Rolling Walker with some with use of left rail and some use of right rail as per home environment pt has to change rail side with steps  with Independent.     Treatment & Education:  Pt performed supine HEP per TKA one set 10reps with handout in room and reviewed  present no assist required     (In degrees) AROM PROM   R knee flexion 85 110   R knee extension 0 0   Pt measure in supine position no over pressure applied       Patient left up in chair with call button in reach,   present, and pt emi tx well  ..    GOALS:   Multidisciplinary Problems       Physical Therapy Goals          Problem: Physical Therapy    Goal Priority Disciplines Outcome Goal Variances Interventions   Physical Therapy Goal     PT, PT/OT Ongoing, Progressing     Description: Pt will improve functional independence by performing:    Bed mobility: SBA--met  Sit to stand: SBA--met  Car Transfer: SBA with rolling walker--met  Ambulation x 200'  feet with SBA and rolling walker--met  1 Step (Curb): Min A  and rolling walker--met  3 Steps: Min A  and R HR--met  right knee AROM flexion (in degrees): 90  right knee AROM extension (in degrees): 0   Independent with total knee HEP                          Time Tracking:     PT Received On: 04/18/23  PT Start Time: 0825     PT Stop Time: 0910  PT Total Time (min): 45 min     Billable Minutes: Gait Training 15min, Therapeutic Activity 15min, and Therapeutic Exercise 15min    Treatment Type: Treatment  PT/PTA: PTA     Number of PTA visits since last PT visit: 1 04/18/2023  "

## 2023-04-18 NOTE — PROGRESS NOTES
"No acute events overnight.  Pain controlled.  Resting in bed. Feeling well this AM.    Vital Signs  Temp: 98.2 °F (36.8 °C)  Temp Source: Oral  Pulse: 87  Heart Rate Source: Monitor  Resp: 18  SpO2: 98 %  Pulse Oximetry Type: Intermittent  Flow (L/min): 10  Oxygen Concentration (%): 80  Device (Oxygen Therapy): room air  BP: 119/66  BP Location: Left arm  BP Method: Automatic  Patient Position: Lying  Height and Weight  Height: 5' 1" (154.9 cm)  Weight: 78.4 kg (172 lb 13.5 oz)  Weight Method: Stated  BSA (Calculated - sq m): 1.84 sq meters  BMI (Calculated): 32.7  Weight in (lb) to have BMI = 25: 132]    +FHL/EHL  BCR distally  Dressing c/d/i  SILT distally    Recent Lab Results  (Last 5 results in the past 24 hours)        04/18/23  1126   04/18/23  0523   04/18/23  0512   04/17/23  1945   04/17/23  1611        Anion Gap     8.0           BUN     14.5           BUN/CREAT RATIO     13           Calcium     8.9           Chloride     101           CO2     27           Creatinine     1.09           eGFR     52           Glucose     176           Hematocrit     28.3           Hemoglobin     8.6           MCH     22.3           MCHC     30.4           MCV     73.5           MPV     10.2           nRBC     0.0           Platelets     271           POCT Glucose 131   177     229   201       Potassium     3.7           RBC     3.85           RDW     15.8           Sodium     136           WBC     15.1                                  A/P:  Status post TKA  Pain controlled  Overall patient doing well.  Therapy for mobility and ambulation.  ASA for DVT PPx  Home when stable/pain controlled  "

## 2023-04-18 NOTE — PT/OT/SLP PROGRESS
Physical Therapy Treatment    Patient Name:  Eve Altman   MRN:  [id number]    Recommendations:     Discharge Recommendations: outpatient OT, home health PT  Discharge Equipment Recommendations: none  Barriers to discharge: None    Assessment:     Eve Altman is a 78 y.o. female admitted with a medical diagnosis of Primary osteoarthritis of right knee.  She presents with the following impairments/functional limitations: weakness, decreased lower extremity function, decreased ROM, pain, orthopedic precautions .    Rehab Prognosis: Good; patient would benefit from acute skilled PT services to address these deficits and reach maximum level of function.    Recent Surgery: Procedure(s) (LRB):  ROBOTIC ARTHROPLASTY, KNEE, TOTAL (Right) 1 Day Post-Op    Plan:     During this hospitalization, patient to be seen BID to address the identified rehab impairments via gait training, therapeutic activities, therapeutic exercises and progress toward the following goals:    Plan of Care Expires:  04/24/23    Subjective     Chief Complaint: none  Patient/Family Comments/goals: to go home   Pain/Comfort:  Pain Rating 1: 6/10  Location - Side 1: Right  Location 1: knee  Pain Addressed 1: Nurse notified      Objective:     Communicated with nursing  prior to session.  Patient found sitting edge of bed with peripheral IV, Other (comments) (sitting EOB family present) upon PT entry to room.     General Precautions: Standard, fall  Orthopedic Precautions: RLE weight bearing as tolerated  Braces: N/A  Respiratory Status: Room air     Functional Mobility:  Bed Mobility:     Sit to Supine: independence  Transfers:     Sit to Stand:  modified independence with rolling walker  Gait: pt amb use of rw 400ft mod I rtle wbat     Treatment & Education:  Pt performed supine HEP one set 10reps with no assist from therapist during exercises noted drainage to incision nursing called to reinforce incision     Patient left HOB elevated with call  button in reach, family  present, and pt emi tx well all dc concerns addressed  ..    GOALS:   Multidisciplinary Problems       Physical Therapy Goals       Not on file              Multidisciplinary Problems (Resolved)          Problem: Physical Therapy    Goal Priority Disciplines Outcome Goal Variances Interventions   Physical Therapy Goal   (Resolved)     PT, PT/OT Met     Description: Pt will improve functional independence by performing:    Bed mobility: SBA--met  Sit to stand: SBA--met  Car Transfer: SBA with rolling walker--met  Ambulation x 200'  feet with SBA and rolling walker--met  1 Step (Curb): Min A  and rolling walker--met  3 Steps: Min A  and R HR--met  right knee AROM flexion (in degrees): 90  right knee AROM extension (in degrees): 0   Independent with total knee HEP                          Time Tracking:     PT Received On: 04/18/23  PT Start Time: 1330     PT Stop Time: 1400  PT Total Time (min): 30 min     Billable Minutes: Gait Training 15min and Therapeutic Exercise 15min    Treatment Type: Treatment  PT/PTA: PTA     Number of PTA visits since last PT visit: 1 04/18/2023

## 2023-04-18 NOTE — NURSING
Pt left in [place] with staff member. Daughter at bedside for all discharge instructions. Pt stable,no distress. Wound care supplies given to patient and daughter states she is comfortable with all discharge instructions. Pt has rolling walker at home and is set up with OUTpatient PT. Pain controlled. Pt aware of all meds ready at pharmacy.

## 2023-04-18 NOTE — PLAN OF CARE
S/p TKR. Spk w pt & , Sean...  to asst w homecare. Pt has RW. Provider list given. Foc obtained.   Called referral for outpatient therapy to [facility]. They will contact pt with appt date & time.   PCP: Dr Jerome    Contact # Sean [phone]      Patient DID participate in a pre-op exercise regimen  -- home exercise

## 2023-04-18 NOTE — PLAN OF CARE
Problem: Physical Therapy  Goal: Physical Therapy Goal  Description: Pt will improve functional independence by performing:    Bed mobility: SBA--met  Sit to stand: SBA--met  Car Transfer: SBA with rolling walker--met  Ambulation x 200'  feet with SBA and rolling walker--met  1 Step (Curb): Min A  and rolling walker--met  3 Steps: Min A  and R HR--met  right knee AROM flexion (in degrees): 90  right knee AROM extension (in degrees): 0   Independent with total knee HEP     Outcome: Ongoing, Progressing

## 2023-04-18 NOTE — DISCHARGE INSTRUCTIONS
[facility]  [address] [place] [address] [id number]  [place] [address]  Phone [phone]       /      Fax [phone]  SURGEON: Dr. Allen    After discharge, all questions or concerns should be handled at your surgeon's office ([phone]). If it is a weekend or after hours, you will get the surgeon on call.     Discharge Medications:    PAIN MANAGEMENT: Next Dose Available   Tylenol/Acetaminophen 500mg- every 4 hours, around the clock (WHILE AWAKE) 4:00pm   Robaxin/Methocarbamol 750mg (Muscle Relaxer) - Every 6-8 hours AS NEEDED for muscle spasms, thigh pain or additional pain control 8:30pm   Ultram/Tramadol 50mg (Pain Med) - 1/2 tablet every 4-6 hours AS NEEDED for BREAKTHROUGH pain anytime   COMPLICATION PREVENTION MEDS: Next Dose DUE   Plavix 75mg at bedtime - ok to restart home dose  Aspirin 81mg twice a day for 6 weeks post-op for blood clot prevention PM on 4/19/23  PM on 4/18/23   MiraLAX 17gm - once or twice a day while on narcotics and muscle relaxers for constipation prevention PM on 4/18/23   Duracef/Cefadroxil (antibiotic) - twice a day for 7 days for infection prevention.  Contact MD if still draining next week.  PM on 4/18/23       Total Knee Replacement      PAIN MEDICATIONS/PAIN MANAGEMENT: (Use the medication log in your discharge packet to keep track of your medications)  For best wound healing and to preserve your kidney function, NO anti-inflammatories (Ibuprofen, Aleve, Motrin, Naproxen, Mobic/Meloxicam, Toradol/Ketorolac, Celebrex, Diclofenac/Voltaren...etc) for 2 weeks or until the wound is healed.        Tylenol/Acetaminophen 500mg every 4 hours, around the clock (WHILE AWAKE).   Take Ultram (Tramadol) 50mg (pain pill) 1/2 tablet every 4-6 hours as needed for BREAKTHROUGH pain ONLY.    **NO MORE THAN 3000mg OF TYLENOL IN 24 HOURS**.     Robaxin/Methocarbamol 750mg (muscle relaxer)- you can take every 6-8 hours as needed for muscle spasms, thigh pain and stiffness,  additional pain control or breakthrough pain medications. This medication is helpful for pain control while lessening your need for narcotics. Please reduce the use gradually as the pain and spasms lessen. DO NOT TAKE AT THE SAME TIME AS A PAIN PILL. YOU WILL BE BETTER SERVED WITH 2 HOURS BETWEEN PAIN PILL AND MUSCLE RELAXER.   **Other things that help with pain control is WALKING, COMPRESSION WRAP, ICE and ELEVATION!!**    BLOOD CLOT PREVENTION:   Ok to restart Plavix 75mg at bedtime. Start on the evening of 4/19/23.  In addition, increase Aspirin 81mg (blood thinner) - to TWICE A DAY twice a day for 6 weeks for blood clot prevention. Start on the evening of 4/18/23. Stop on 5/30/23.  If you were taking Baby Aspirin 81mg prior to surgery, may return to daily dose AFTER 6 weeks - 6/1/23.  You need to continuing wearing your compression stocking (GM Hose - ThromboEmbolic Disease Prevention Device) for the next 2-6 weeks post-op. It is ok to remove them for hygiene and at bedtime.   Hand wash and Dry. **If the swelling persists in the legs after you stop wearing the Gm hose, continue to wear them until the swelling decreases.**  REMOVE STOCKINGS AT LEAST DAILY FOR SKIN ASSESSMENT.   Do NOT let the stockings roll down, creating a tourniquet around the back of your knee. If you need to, leave the excess at the bottom of the stocking.   The best thing you can do to prevent blood clots is to walk around as much as possible, AT LEAST EVERY 1-2 HOURS.       CONSTIPATION PREVENTION:   Miralax or Senokot S/Renee-Colace and Stool softeners EVERY DAY while on pain meds.  Use other more aggressive over the counter LAXATIVES as needed for constipation (Examples: Milk of Magnesia, Dulcolax tabs or suppository, Magnesium Citrate, Fleet's Enema...etc.)   Drink lots of water.  Increase Fiber in diet.  Increase walking distance each day  DO NOT GO MORE THAN 2 DAYS WITHOUT HAVING A BOWEL MOVEMENT!    ACTIVITY:   Weight bearing  precautions as follows:  FULL weight bearing to operative leg with walker.   DO NOT TAKE YOURSELF OFF OF THE WALKER TOO SOON. ALLOW YOUR OUTPATIENT THERAPIST or SURGEON TO GUIDE YOU.   Range of motion as tolerated. Work on BENDING and STRAIGHTENING your knee. Change positions often throughout the day. DO NOT PUT ANYTHING BEHIND THE KNEE KEEPING IT IN A BENT POSITION.   Elevate affected extremity way ABOVE THE LEVEL OF THE HEART to reduce swelling.  Walk around at least every 1-2 hours while awake.   No heavy lifting, pulling, pushing or straining.  Ice the Knee, thigh and lower leg AS MUCH AS POSSIBLE  Outpatient Physical Therapy - Bring prescription to clinic of choice as soon as possible.     WOUND CARE:   Dry dressing changes every other day and as needed for soiling for 14 days. Start THURSDAY. You may need to change the dressing daily while the wound is draining. Switch to every other day as soon as possible. NOTIFY MD OF EXCESSIVE WOUND DRAINAGE. Call office to be seen next week if drainage is not improving.     DO NOT WET WOUND or apply any ointments, creams, lotions or antiseptics.  Ace wrap - apply your compression stocking and apply the ace wrap where the stocking stops for extra added compression to the knee.   May wet incision AFTER 2 weeks- (after you follow-up with your surgeon).   Ok to shower before then if able to keep wound from getting wet (plastic barrier, saran wrap or cling wrap and tape).   DO NOT TOUCH INCISION      URINARY RETENTION:  If you start having difficulty urinating, decrease the use of Pain pills and muscle relaxers and notify your primary care doctor.     PNEUMONIA PREVENTION:  Stay out of bed as much as possible and walk around every 1-2 hours.  Continue breathing exercises (Incentive Spirometry) every 1-2 hours while mobility is limited and while you are on pain pills.    INFECTION PREVENTION:  Proper handwashing before and after dressing changes. Do not wet the wound. Wound  care instructions as written above. NOTIFY MD OF EXCESSIVE WOUND DRAINAGE.  No alcohol, smoking or tobacco products  Pets should not be allowed around the wound or the dressing.   Treat UTI and skin infections as soon as possible.  Pre-medicate with antibiotics prior to dental or surgical procedures.   If you are diabetic, MAINTAIN GOOD BLOOD SUGAR CONTROL (Below 150) DURING YOUR RECOVERY. If you see high numbers, notify your primary care doctor.     Call your SURGEON'S OFFICE ([phone]) if you experience the following signs and symptoms of infection:   Unusual redness, swelling, excessive, cloudy or foul smelling drainage at the incision site.   Persistent low grade temp OR a temp greater than 102 F, unrelieved by Tylenol  Pain at surgical site, unrelieved by pain meds    Warning signs of a blood clot in your leg: (CALL YOUR SURGEON)  New onset or increasing pain in calf, new onset tenderness or redness above or below the knee or increasing swelling of your calf, ankle, or foot.  Warning signs that a blood clot has traveled to your lungs: (REPORT TO THE ER/CALL 390)  Sudden or increase in Shortness of breath, sudden onset of chest pains, or  Localized chest pain with coughing.       IF ANY ISSUES ARISE AND YOU FEEL THE NEED TO CALL YOUR PRIMARY CARE DOCTOR, PLEASE LET YOUR SURGEON KNOW AS WELL.     For emergencies, please report to OUR ([facility] or [facility] main campus) Emergency department and tell them to call YOUR SURGEON at [phone].     BEFORE MAKING ANY CHANGES TO THE MEDICAL CARE PLAN OR GOING TO THE EMERGENCY ROOM, PLEASE CONTACT THE SURGEON.    3rd floor nursing unit # [phone]  Use this number for questions about your discharge instructions or problems filling your discharge prescriptions.

## 2023-04-18 NOTE — PLAN OF CARE
Problem: Adult Inpatient Plan of Care  Goal: Plan of Care Review  Outcome: Met  Goal: Patient-Specific Goal (Individualized)  Outcome: Met  Goal: Absence of Hospital-Acquired Illness or Injury  Outcome: Met  Goal: Optimal Comfort and Wellbeing  Outcome: Met  Goal: Readiness for Transition of Care  Outcome: Met     Problem: Diabetes Comorbidity  Goal: Blood Glucose Level Within Targeted Range  Outcome: Met     Problem: Infection  Goal: Absence of Infection Signs and Symptoms  Outcome: Met     Problem: Adjustment to Surgery (Knee Arthroplasty)  Goal: Optimal Coping  Outcome: Met     Problem: Bleeding (Knee Arthroplasty)  Goal: Absence of Bleeding  Outcome: Met     Problem: Bowel Motility Impaired (Knee Arthroplasty)  Goal: Effective Bowel Elimination  Outcome: Met     Problem: Fluid and Electrolyte Imbalance (Knee Arthroplasty)  Goal: Fluid and Electrolyte Balance  Outcome: Met     Problem: Functional Ability Impaired (Knee Arthroplasty)  Goal: Optimal Functional Ability  Outcome: Met     Problem: Infection (Knee Arthroplasty)  Goal: Absence of Infection Signs and Symptoms  Outcome: Met     Problem: Neurovascular Compromise (Knee Arthroplasty)  Goal: Intact Neurovascular Status  Outcome: Met     Problem: Ongoing Anesthesia Effects (Knee Arthroplasty)  Goal: Anesthesia/Sedation Recovery  Outcome: Met     Problem: Pain (Knee Arthroplasty)  Goal: Acceptable Pain Control  Outcome: Met     Problem: Postoperative Nausea and Vomiting (Knee Arthroplasty)  Goal: Nausea and Vomiting Relief  Outcome: Met     Problem: Postoperative Urinary Retention (Knee Arthroplasty)  Goal: Effective Urinary Elimination  Outcome: Met     Problem: Respiratory Compromise (Knee Arthroplasty)  Goal: Effective Oxygenation and Ventilation  Outcome: Met     Problem: Hypertension Comorbidity  Goal: Blood Pressure in Desired Range  Outcome: Met     Problem: Physical Therapy  Goal: Physical Therapy Goal  Description: Pt will improve functional  independence by performing:    Bed mobility: SBA--met  Sit to stand: SBA--met  Car Transfer: SBA with rolling walker--met  Ambulation x 200'  feet with SBA and rolling walker--met  1 Step (Curb): Min A  and rolling walker--met  3 Steps: Min A  and R HR--met  right knee AROM flexion (in degrees): 90  right knee AROM extension (in degrees): 0   Independent with total knee HEP     Outcome: Met

## 2023-04-18 NOTE — PLAN OF CARE
Problem: Adult Inpatient Plan of Care  Goal: Plan of Care Review  Outcome: Ongoing, Progressing  Flowsheets (Taken 4/18/2023 0311)  Plan of Care Reviewed With:   patient   spouse     Problem: Diabetes Comorbidity  Goal: Blood Glucose Level Within Targeted Range  Outcome: Ongoing, Progressing  Intervention: Monitor and Manage Glycemia  Flowsheets (Taken 4/18/2023 0311)  Glycemic Management:   blood glucose monitored   oral hydration promoted   supplemental insulin given     Problem: Infection  Goal: Absence of Infection Signs and Symptoms  Outcome: Ongoing, Progressing  Intervention: Prevent or Manage Infection  Flowsheets (Taken 4/18/2023 0311)  Infection Management: aseptic technique maintained

## 2023-04-19 ENCOUNTER — TELEPHONE (OUTPATIENT)
Dept: ORTHOPEDICS | Facility: CLINIC | Age: 78
End: 2023-04-19
Payer: MEDICARE

## 2023-04-19 ENCOUNTER — PATIENT MESSAGE (OUTPATIENT)
Dept: ADMINISTRATIVE | Facility: OTHER | Age: 78
End: 2023-04-19
Payer: MEDICARE

## 2023-04-19 ENCOUNTER — PATIENT OUTREACH (OUTPATIENT)
Dept: ADMINISTRATIVE | Facility: CLINIC | Age: 78
End: 2023-04-19
Payer: MEDICARE

## 2023-04-19 NOTE — PROGRESS NOTES
C3 nurse spoke with Eve Altman  for a TCC post hospital discharge follow up call. The patient has a scheduled HOSFU appointment with Dr. Allen on 05/04/2023 @ 815 am.  The patient does not have a scheduled HOSFU appointment with Charity Jerome MD  within 5-7 days post hospital discharge date 04/18/2023. Appointment with Charity Jerome MD on 06/01/2023 @ 220 pm.     Message sent to PCP staff requesting they contact patient and schedule follow up appointment.

## 2023-04-19 NOTE — PROGRESS NOTES
C3 nurse spoke with Eve Altman  for a TCC post hospital discharge follow up call. Requested call back. Patient stated she's hurting and just lied down. Stated tylenol and ultram not working, daughter called  for [facility] pain medication prescription and awaiting a call back. The patient has a scheduled HOSFU appointment with Dr. Allen on 05/04/2023 @ 815 am.  The patient does not have a scheduled HOSFU appointment with Charity Jerome MD  within 5-7 days post hospital discharge date 04/18/2023. Appointment with Charity Jerome MD on 06/01/2023 @ 220 pm.    Message sent to PCP staff requesting they contact patient and schedule follow up appointment.

## 2023-04-19 NOTE — TELEPHONE ENCOUNTER
Patient's daughter called because her mother is in a lot of pain up to 12.  Tramadol is not working and would like a script for a low dose of Norco.  Will forward message to Tami.

## 2023-04-20 ENCOUNTER — PATIENT MESSAGE (OUTPATIENT)
Dept: ADMINISTRATIVE | Facility: OTHER | Age: 78
End: 2023-04-20
Payer: MEDICARE

## 2023-04-20 ENCOUNTER — TELEPHONE (OUTPATIENT)
Dept: INTERNAL MEDICINE | Facility: CLINIC | Age: 78
End: 2023-04-20
Payer: MEDICARE

## 2023-04-20 DIAGNOSIS — Z47.1 AFTERCARE FOLLOWING RIGHT KNEE JOINT REPLACEMENT SURGERY: Primary | ICD-10-CM

## 2023-04-20 DIAGNOSIS — Z96.651 AFTERCARE FOLLOWING RIGHT KNEE JOINT REPLACEMENT SURGERY: Primary | ICD-10-CM

## 2023-04-20 LAB — VIEW PATHOLOGY REPORT (RELIAPATH): NORMAL

## 2023-04-20 RX ORDER — HYDROCODONE BITARTRATE AND ACETAMINOPHEN 5; 325 MG/1; MG/1
1 TABLET ORAL EVERY 6 HOURS PRN
Qty: 28 TABLET | Refills: 0 | Status: SHIPPED | OUTPATIENT
Start: 2023-04-20 | End: 2023-04-27

## 2023-04-20 NOTE — DISCHARGE SUMMARY
[facility]  Discharge Summary      Admit Date: 4/17/2023    Discharge Date and Time: 4/18/2023  3:00 PM    Attending Physician: Kae att. providers found     Reason for Admission: primary osteoarthritis right knee    Procedures Performed: Procedure(s) (LRB):  ROBOTIC ARTHROPLASTY, KNEE, TOTAL (Right)    Hospital Course Patient seen in clinic with complaints of right knee pain. Tried and failed all conservative measures including activity modification, anti-inflammatories, and injections. Patient felt as though they have reached a point of disability with regards to right knee pain. Risk, benefits, and alternatives discussed for right total knee arthroplasty. Despite these risks, the patient wished to proceed with surgical intervention.    Patient admitted as an outpatient. Seen preoperatively by anesthesia and cleared for surgery. Patient was then taken to the OR and a right total knee arthroplasty was performed. For full details please see dictated operative note. Patient tolerated the procedure without any issues and was transferred to the floor postoperatively. Physical therapy began working with the patient on postop day 1 and patient was made weightbearing as tolerated to affected extremity. Patient progressed well with physical therapy and eventually cleared all physical therapy guidelines. Patient's pain and vitals remained stable throughout hospitalization. Wound was checked and found to be clean, dry, and intact. At this point the patient was deemed suitable to discharge home.      Goals of Care Treatment Preferences:  Code Status: Full Code      Consults: PT    Significant Diagnostic Studies:   Specimen (24h ago, onward)      None            Final Diagnoses:    Principal Problem: Primary osteoarthritis of right knee   Secondary Diagnoses:   Active Hospital Problems    Diagnosis  POA    *Primary osteoarthritis of right knee [M17.11]  Yes      Resolved Hospital Problems   No  resolved problems to display.       Discharged Condition: good    Disposition: Home or Self Care    Follow Up/Patient Instructions:     Medications:  Reconciled Home Medications:      Medication List        START taking these medications      acetaminophen 500 MG tablet  Commonly known as: TYLENOL  Take 1 tablet (500 mg total) by mouth every 4 (four) hours. for 14 days     cefadroxil 500 MG Cap  Commonly known as: DURICEF  Take 1 capsule (500 mg total) by mouth every 12 (twelve) hours. for 7 days     methocarbamoL 750 MG Tab  Commonly known as: ROBAXIN  Take 1 tablet (750 mg total) by mouth every 6 (six) hours as needed (muscle spasms).     polyethylene glycol 17 gram Pwpk  Commonly known as: GLYCOLAX  Take 17 g by mouth once daily. Constipation Prevention for 14 days     traMADoL 50 mg tablet  Commonly known as: ULTRAM  Take 0.5 tablets (25 mg total) by mouth every 4 (four) hours as needed for Pain.            CHANGE how you take these medications      aspirin 81 MG EC tablet  Commonly known as: ECOTRIN  Take 1 tablet (81 mg total) by mouth once daily. Increase to twice a day for 6 weeks post-op for blood clot prevention.  What changed:   when to take this  additional instructions            CONTINUE taking these medications      carvediloL 12.5 MG tablet  Commonly known as: COREG  Take 1 tablet (12.5 mg total) by mouth 2 (two) times a day.     clopidogreL 75 mg tablet  Commonly known as: PLAVIX  Take 75 mg by mouth once daily.     FLAXSEED OIL-OMEGA 3,6,9 ORAL  Take 1 capsule by mouth 3 (three) times daily.     glyBURIDE-metformin 2.5-500 mg per tablet  Commonly known as: GLUCOVANCE  Take 1 tablet by mouth 2 (two) times daily with meals.     isosorbide mononitrate 30 MG 24 hr tablet  Commonly known as: IMDUR  Take 1 tablet (30 mg total) by mouth once daily.     nitroGLYCERIN 0.4 MG SL tablet  Commonly known as: NITROSTAT  TAKE 1 TABLET (SUBLINGUAL) ONCE EVERY 5 MIN FOR 3 DOSES FOR CHEST PAIN IF NOT RELIEVED GO TO  ER.     pantoprazole 40 MG tablet  Commonly known as: PROTONIX  Take 1 tablet (40 mg total) by mouth once daily.     red yeast rice 600 mg Cap  Take 1 tablet by mouth Daily.     zinc acetate 25 mg (zinc) Cap  Take 25 mg by mouth once daily.            No discharge procedures on file.   Follow-up Information       Yung Allen MD. Go on 5/4/2023.    Specialty: Orthopedic Surgery  Why: Ortho follow up appt on Thursday 5/4/23 @ 8:15am.  Contact information:  [address]  [address]  [facility] [address]  [phone]               [facility] Follow up.    Specialty: Physical Therapy  Why: This is the outpatient therapy facility. They will contact pt with appt date & time. Call if you have questions or concerns.  Contact information:  [address]  [facility] [address]  [phone]

## 2023-04-20 NOTE — TELEPHONE ENCOUNTER
----- Message from Melissa Lynn sent at 4/20/2023  8:59 AM CDT -----  Regarding: hosp follow up  Called patient to make hospital follow up from surgery and she has follow up with surgeon on 5/4 and she comes see Dr GARRETT on 6/1 and she just wants to keep her appt on 6/1 and not come earlier

## 2023-04-21 ENCOUNTER — PATIENT MESSAGE (OUTPATIENT)
Dept: ADMINISTRATIVE | Facility: OTHER | Age: 78
End: 2023-04-21
Payer: MEDICARE

## 2023-04-24 ENCOUNTER — TELEPHONE (OUTPATIENT)
Dept: ORTHOPEDICS | Facility: CLINIC | Age: 78
End: 2023-04-24
Payer: MEDICARE

## 2023-04-24 NOTE — TELEPHONE ENCOUNTER
Lisa called because her mother is taking Tramadol and Tylenol and saving the Norco 5mg for physical therapy.  She wanted to know if she should continue to take the medication that way or get more Tramadol.  Informed her that she could not get both Tramadol and Norco refilled, she understood.

## 2023-04-26 ENCOUNTER — PATIENT MESSAGE (OUTPATIENT)
Dept: ADMINISTRATIVE | Facility: OTHER | Age: 78
End: 2023-04-26
Payer: MEDICARE

## 2023-05-01 DIAGNOSIS — Z47.1 AFTERCARE FOLLOWING RIGHT KNEE JOINT REPLACEMENT SURGERY: Primary | ICD-10-CM

## 2023-05-01 DIAGNOSIS — Z96.651 AFTERCARE FOLLOWING RIGHT KNEE JOINT REPLACEMENT SURGERY: Primary | ICD-10-CM

## 2023-05-01 RX ORDER — TRAMADOL HYDROCHLORIDE 50 MG/1
50 TABLET ORAL EVERY 6 HOURS
Qty: 28 TABLET | Refills: 0 | Status: SHIPPED | OUTPATIENT
Start: 2023-05-01 | End: 2023-06-01

## 2023-05-02 DIAGNOSIS — Z96.651 AFTERCARE FOLLOWING RIGHT KNEE JOINT REPLACEMENT SURGERY: Primary | ICD-10-CM

## 2023-05-02 DIAGNOSIS — Z47.1 AFTERCARE FOLLOWING RIGHT KNEE JOINT REPLACEMENT SURGERY: Primary | ICD-10-CM

## 2023-05-02 RX ORDER — TRAMADOL HYDROCHLORIDE 50 MG/1
50 TABLET ORAL EVERY 6 HOURS PRN
Qty: 28 TABLET | Refills: 0 | Status: SHIPPED | OUTPATIENT
Start: 2023-05-02 | End: 2023-05-09

## 2023-05-03 ENCOUNTER — PATIENT MESSAGE (OUTPATIENT)
Dept: ADMINISTRATIVE | Facility: OTHER | Age: 78
End: 2023-05-03
Payer: MEDICARE

## 2023-05-04 ENCOUNTER — HOSPITAL ENCOUNTER (OUTPATIENT)
Dept: RADIOLOGY | Facility: CLINIC | Age: 78
Discharge: HOME | End: 2023-05-04
Attending: ORTHOPAEDIC SURGERY
Payer: MEDICARE

## 2023-05-04 ENCOUNTER — OFFICE VISIT (OUTPATIENT)
Dept: ORTHOPEDICS | Facility: CLINIC | Age: 78
End: 2023-05-04
Payer: MEDICARE

## 2023-05-04 VITALS
DIASTOLIC BLOOD PRESSURE: 71 MMHG | WEIGHT: 169.81 LBS | BODY MASS INDEX: 32.06 KG/M2 | SYSTOLIC BLOOD PRESSURE: 149 MMHG | HEART RATE: 106 BPM | HEIGHT: 61 IN

## 2023-05-04 DIAGNOSIS — Z96.651 AFTERCARE FOLLOWING RIGHT KNEE JOINT REPLACEMENT SURGERY: Primary | ICD-10-CM

## 2023-05-04 DIAGNOSIS — Z47.1 AFTERCARE FOLLOWING RIGHT KNEE JOINT REPLACEMENT SURGERY: Primary | ICD-10-CM

## 2023-05-04 DIAGNOSIS — Z96.651 AFTERCARE FOLLOWING RIGHT KNEE JOINT REPLACEMENT SURGERY: ICD-10-CM

## 2023-05-04 DIAGNOSIS — Z47.1 AFTERCARE FOLLOWING RIGHT KNEE JOINT REPLACEMENT SURGERY: ICD-10-CM

## 2023-05-04 PROCEDURE — 73562 XR KNEE 3 VIEW RIGHT: ICD-10-PCS | Mod: RT,,, | Performed by: ORTHOPAEDIC SURGERY

## 2023-05-04 PROCEDURE — 99024 POSTOP FOLLOW-UP VISIT: CPT | Mod: POP,,, | Performed by: ORTHOPAEDIC SURGERY

## 2023-05-04 PROCEDURE — 99024 PR POST-OP FOLLOW-UP VISIT: ICD-10-PCS | Mod: POP,,, | Performed by: ORTHOPAEDIC SURGERY

## 2023-05-04 PROCEDURE — 73562 X-RAY EXAM OF KNEE 3: CPT | Mod: RT,,, | Performed by: ORTHOPAEDIC SURGERY

## 2023-05-04 RX ORDER — METHOCARBAMOL 750 MG/1
500 TABLET, FILM COATED ORAL 4 TIMES DAILY
COMMUNITY
End: 2023-06-01

## 2023-05-04 NOTE — PROGRESS NOTES
Chief Complaint:   Chief Complaint   Patient presents with    Post-op Evaluation     Pt reports a mild soreness in her knee during the day and after PT. Pt is attending PT 3x a week and improving her ROM. Pt describes her discomfort as jabbing sensation at times.       History of present illness:  78-year-old female presents for evaluation follow-up after total knee arthroplasty.  Patient is 2 weeks out.  Overall she is doing well.  Pain is well-controlled.  Using a walker.  Working with physical therapy.    Past Medical History:   Diagnosis Date    Chronic GERD     Coronary artery disease     Essential (primary) hypertension     H/O heart artery stent     HLD (hyperlipidemia)     Hypothyroidism, unspecified     IC (interstitial cystitis)     Osteoarthritis of right knee     Osteopenia     Stress incontinence (female) (male)     Type 2 diabetes mellitus without complications        Past Surgical History:   Procedure Laterality Date    Bone and/or joint imaging; whole body   06/24/2019    BUNIONECTOMY      CATARACT EXTRACTION W/  INTRAOCULAR LENS IMPLANT Bilateral     CHOLECYSTECTOMY      COLONOSCOPY  03/11/2013    CORONARY ANGIOPLASTY WITH STENT PLACEMENT      x 3    ESOPHAGEAL DILATION      ESOPHAGOGASTRODUODENOSCOPY  04/16/2019    ESOPHAGOGASTRODUODENOSCOPY  03/04/2013    HYSTERECTOMY      LUMBAR SPINE SURGERY  07/24/2019    OPEN REDUCTION AND INTERNAL FIXATION (ORIF) OF INJURY OF SHOULDER Right     ROBOTIC ARTHROPLASTY, KNEE Right 4/17/2023    Procedure: ROBOTIC ARTHROPLASTY, KNEE, TOTAL;  Surgeon: Yung Allen MD;  Location: [facility];  Service: Orthopedics;  Laterality: Right;    Suspension of bladder      x2    TUBAL LIGATION  1975       Current Outpatient Medications   Medication Sig    aspirin (ECOTRIN) 81 MG EC tablet Take 1 tablet (81 mg total) by mouth once daily. Increase to twice a day for 6 weeks post-op for blood clot prevention.    carvediloL (COREG) 12.5 MG tablet Take 1 tablet (12.5 mg total)  by mouth 2 (two) times a day. (Patient taking differently: Take 12.5 mg by mouth Daily.)    clopidogreL (PLAVIX) 75 mg tablet Take 75 mg by mouth once daily.    FLAXSEED OIL-OMEGA 3,6,9 ORAL Take 1 capsule by mouth 3 (three) times daily.    glyBURIDE-metformin (GLUCOVANCE) 2.5-500 mg per tablet Take 1 tablet by mouth 2 (two) times daily with meals.    isosorbide mononitrate (IMDUR) 30 MG 24 hr tablet Take 1 tablet (30 mg total) by mouth once daily.    methocarbamoL (ROBAXIN) 750 MG Tab Take 500 mg by mouth 4 (four) times daily.    nitroGLYCERIN (NITROSTAT) 0.4 MG SL tablet TAKE 1 TABLET (SUBLINGUAL) ONCE EVERY 5 MIN FOR 3 DOSES FOR CHEST PAIN IF NOT RELIEVED GO TO ER.    pantoprazole (PROTONIX) 40 MG tablet Take 1 tablet (40 mg total) by mouth once daily.    red yeast rice 600 mg Cap Take 1 tablet by mouth Daily.    traMADoL (ULTRAM) 50 mg tablet Take 1 tablet (50 mg total) by mouth every 6 (six) hours as needed for Pain.    zinc acetate 25 mg (zinc) Cap Take 25 mg by mouth once daily.    traMADoL (ULTRAM) 50 mg tablet Take 1 tablet (50 mg total) by mouth every 6 (six) hours. (Patient not taking: Reported on 5/4/2023)     No current facility-administered medications for this visit.       Review of patient's allergies indicates:   Allergen Reactions    Adhesive Dermatitis    Niacin Shortness Of Breath     Other reaction(s): UNKNOWN  Redness and SOB    Lisinopril Other (See Comments)    Latex      Other reaction(s): UNKNOWN       Family History   Problem Relation Age of Onset    Heart attack Mother     Heart disease Mother     Diabetes Father     Cancer Sister     Idiopathic pulmonary fibrosis Brother     Cancer Brother         I P F       Social History     Socioeconomic History    Marital status:    Tobacco Use    Smoking status: Never     Passive exposure: Never    Smokeless tobacco: Never   Substance and Sexual Activity    Alcohol use: Not Currently    Drug use: Never    Sexual activity: Yes     Partners:  Female           Review of Systems:    Constitution: Negative for chills, fever, and sweats.  Negative for unexplained weight loss.    HENT:  Negative for headaches and blurry vision.    Cardiovascular:Negative for chest pain or irregular heart beat. Negative for hypertension.    Respiratory:  Negative for cough and shortness of breath.    Gastrointestinal: Negative for abdominal pain, heartburn, melena, nausea, and vomitting.    Genitourinary:  Negative bladder incontinence and dysuria.    Musculoskeletal:  See HPI    Neurological: Negative for numbness.    Psychiatric/Behavioral: Negative for depression.  The patient is not nervous/anxious.      Endocrine: Negative for polyuria    Hematologic/Lymphatic: Negative for bleeding problem.  Does not bruise/bleed easily.    Skin: Negative for poor would healing and rash      Physical Examination:    Vital Signs:    Vitals:    05/04/23 0813   BP: (!) 149/71   Pulse: 106       Body mass index is 32.08 kg/m².    General: No acute distress, alert and oriented, healthy appearing    HEENT: Head is atraumatic, mucous membranes are moist    Neck: Supples, no JVD    Cardiovascular: Palpable dorsalis pedis and posterior tibial pulses, regular rate and rhythm to those pulses    Lungs: Breathing non-labored    Skin: no rashes appreciated    Neurologic: Can flex and extend knees, ankles, and toes. Sensation is grossly intact    Right knee:  Patient gets full extension.  Flexion of 100°.  Incision is clean and dry.    X-rays:  Three views right knee reviewed.  Patient's implants appear well fixed.  No signs of loosening or subsidence noted.     Assessment::  Status post right total knee arthroplasty    Plan:  Overall patient is doing quite well.  Pain is well-controlled.  Plan to see her back in a month.  Continue physical therapy for gait training.  We will remove staples today    This note was created using [employer] voice recognition software that occasionally misinterpreted phrases  or words.    Consult note is delivered via Epic messaging service.

## 2023-05-07 ENCOUNTER — PATIENT MESSAGE (OUTPATIENT)
Dept: ADMINISTRATIVE | Facility: OTHER | Age: 78
End: 2023-05-07
Payer: MEDICARE

## 2023-05-11 ENCOUNTER — PATIENT MESSAGE (OUTPATIENT)
Dept: ADMINISTRATIVE | Facility: OTHER | Age: 78
End: 2023-05-11
Payer: MEDICARE

## 2023-05-24 ENCOUNTER — TELEPHONE (OUTPATIENT)
Dept: ADMINISTRATIVE | Facility: HOSPITAL | Age: 78
End: 2023-05-24
Payer: MEDICARE

## 2023-06-01 ENCOUNTER — OFFICE VISIT (OUTPATIENT)
Dept: INTERNAL MEDICINE | Facility: CLINIC | Age: 78
End: 2023-06-01
Payer: MEDICARE

## 2023-06-01 VITALS
HEART RATE: 88 BPM | BODY MASS INDEX: 31.72 KG/M2 | WEIGHT: 168 LBS | HEIGHT: 61 IN | SYSTOLIC BLOOD PRESSURE: 132 MMHG | OXYGEN SATURATION: 98 % | DIASTOLIC BLOOD PRESSURE: 78 MMHG

## 2023-06-01 DIAGNOSIS — I25.10 CORONARY ARTERY DISEASE INVOLVING NATIVE CORONARY ARTERY OF NATIVE HEART WITHOUT ANGINA PECTORIS: Chronic | ICD-10-CM

## 2023-06-01 DIAGNOSIS — E11.9 TYPE 2 DIABETES MELLITUS WITHOUT COMPLICATION, WITH LONG-TERM CURRENT USE OF INSULIN: Primary | ICD-10-CM

## 2023-06-01 DIAGNOSIS — T46.6X5A MYALGIA DUE TO STATIN: ICD-10-CM

## 2023-06-01 DIAGNOSIS — I10 ESSENTIAL (PRIMARY) HYPERTENSION: ICD-10-CM

## 2023-06-01 DIAGNOSIS — E78.2 MIXED HYPERLIPIDEMIA: Chronic | ICD-10-CM

## 2023-06-01 DIAGNOSIS — Z79.4 TYPE 2 DIABETES MELLITUS WITHOUT COMPLICATION, WITH LONG-TERM CURRENT USE OF INSULIN: Primary | ICD-10-CM

## 2023-06-01 DIAGNOSIS — M79.10 MYALGIA DUE TO STATIN: ICD-10-CM

## 2023-06-01 PROCEDURE — 99214 OFFICE O/P EST MOD 30 MIN: CPT | Mod: ,,, | Performed by: INTERNAL MEDICINE

## 2023-06-01 PROCEDURE — 99214 PR OFFICE/OUTPT VISIT, EST, LEVL IV, 30-39 MIN: ICD-10-PCS | Mod: ,,, | Performed by: INTERNAL MEDICINE

## 2023-06-01 PROCEDURE — 83036 HEMOGLOBIN GLYCOSYLATED A1C: CPT | Performed by: INTERNAL MEDICINE

## 2023-06-01 RX ORDER — CARVEDILOL 6.25 MG/1
6.25 TABLET ORAL 2 TIMES DAILY WITH MEALS
Qty: 180 TABLET | Refills: 3 | Status: SHIPPED | OUTPATIENT
Start: 2023-06-01 | End: 2024-02-08 | Stop reason: SDUPTHER

## 2023-06-01 RX ORDER — PANTOPRAZOLE SODIUM 40 MG/1
40 TABLET, DELAYED RELEASE ORAL DAILY
Qty: 90 TABLET | Refills: 1 | Status: SHIPPED | OUTPATIENT
Start: 2023-06-01 | End: 2024-01-05

## 2023-06-01 RX ORDER — ISOSORBIDE MONONITRATE 30 MG/1
30 TABLET, EXTENDED RELEASE ORAL DAILY
Qty: 90 TABLET | Refills: 3 | Status: SHIPPED | OUTPATIENT
Start: 2023-06-01 | End: 2024-05-31

## 2023-06-01 NOTE — ASSESSMENT & PLAN NOTE
-on red yeast rice extract, intolerant to statins   Recorded Pressure: LV, Ao, HR=52, Condition=Condition 1 (Left Ventricle) /3/10, (Aorta) Ao 98/38/61

## 2023-06-01 NOTE — PROGRESS NOTES
Subjective:      Patient ID: Eve Altman is a 78 y.o. female.    Chief Complaint: Follow-up    Ms. Prado is a 78-year-old female who is here today for follow-up.  Her medical comorbidities are significant for hypertension, hyperlipidemia, CAD status post PTCA to LAD and obtuse marginal, diabetes mellitus type 2, interstitial cystitis.    Currently doing well with no acute needs or complaints .  Recovering well after her right TKA.       Social history:  Negative   Family history:  Sister with esophageal cancer questionable, brother with pulmonary fibrosis   Surgical history: Noted in detail     Cardiology: Dr Ivan  GI: Dr. Kang     DEXA:  2022, request records  MM2022  CRS:  2013  Pneumonia: Up-to-date  Diabetic eye exam: Dr. Cherry    The patient's Health Maintenance was reviewed and the following appears to be due at this time:   Health Maintenance Due   Topic Date Due    Hepatitis C Screening  Never done        Past Medical History:  Past Medical History:   Diagnosis Date    Chronic GERD     Coronary artery disease     Essential (primary) hypertension     H/O heart artery stent     HLD (hyperlipidemia)     Hypothyroidism, unspecified     IC (interstitial cystitis)     Osteoarthritis of right knee     Osteopenia     Stress incontinence (female) (male)     Type 2 diabetes mellitus without complications      Past Surgical History:   Procedure Laterality Date    Bone and/or joint imaging; whole body   2019    BUNIONECTOMY      CATARACT EXTRACTION W/  INTRAOCULAR LENS IMPLANT Bilateral     CHOLECYSTECTOMY      COLONOSCOPY  2013    CORONARY ANGIOPLASTY WITH STENT PLACEMENT      x 3    ESOPHAGEAL DILATION      ESOPHAGOGASTRODUODENOSCOPY  2019    ESOPHAGOGASTRODUODENOSCOPY  2013    HYSTERECTOMY      LUMBAR SPINE SURGERY  2019    OPEN REDUCTION AND INTERNAL FIXATION (ORIF) OF INJURY OF SHOULDER Right     ROBOTIC ARTHROPLASTY, KNEE Right 2023    Procedure: ROBOTIC  "ARTHROPLASTY, KNEE, TOTAL;  Surgeon: Yung Allen MD;  [facility];  Service: Orthopedics;  Laterality: Right;    Suspension of bladder      x2    TUBAL LIGATION  1975     Review of patient's allergies indicates:   Allergen Reactions    Adhesive Dermatitis    Niacin Shortness Of Breath     Other reaction(s): UNKNOWN  Redness and SOB    Lisinopril Other (See Comments)    Latex      Other reaction(s): UNKNOWN     Social History     Socioeconomic History    Marital status:    Tobacco Use    Smoking status: Never     Passive exposure: Never    Smokeless tobacco: Never   Substance and Sexual Activity    Alcohol use: Not Currently    Drug use: Never    Sexual activity: Yes     Partners: Female     Family History   Problem Relation Age of Onset    Heart attack Mother     Heart disease Mother     Diabetes Father     Cancer Sister     Idiopathic pulmonary fibrosis Brother     Cancer Brother         I P F       Review of Systems    A comprehensive review of systems was performed and is negative except for that stated above  Objective:   /78   Pulse 88   Ht 5' 1" (1.549 m)   Wt 76.2 kg (168 lb)   SpO2 98%   BMI 31.74 kg/m²     Physical Exam  Constitutional:       Appearance: Normal appearance.   HENT:      Head: Normocephalic and atraumatic.      Nose: Nose normal.      Mouth/Throat:      Mouth: Mucous membranes are moist.      Pharynx: Oropharynx is clear.   Eyes:      Extraocular Movements: Extraocular movements intact.      Pupils: Pupils are equal, round, and reactive to light.   Cardiovascular:      Rate and Rhythm: Normal rate and regular rhythm.      Pulses: Normal pulses.   Pulmonary:      Effort: Pulmonary effort is normal.      Breath sounds: Normal breath sounds.   Abdominal:      General: Bowel sounds are normal.      Palpations: Abdomen is soft.   Musculoskeletal:         General: Normal range of motion.      Cervical back: Normal range of motion and neck supple.   Skin:     General: " Skin is warm.   Neurological:      General: No focal deficit present.      Mental Status: She is alert and oriented to person, place, and time. Mental status is at baseline.   Psychiatric:         Mood and Affect: Mood normal.     Assessment/ Plan:   1. Type 2 diabetes mellitus without complication, with long-term current use of insulin  Assessment & Plan:  -patient advised on the importance of avoiding excessive carbohydrates and sugary food intake and having some form of routine aerobic exercise on a regular basis.  -on glyburide metformin 2.5-500 mg p.o. b.i.d.    Orders:  -     Cancel: Hemoglobin A1C; Future; Expected date: 06/01/2023  -     Hemoglobin A1C; Future; Expected date: 06/01/2023    2. Coronary artery disease involving native coronary artery of native heart without angina pectoris  Assessment & Plan:  -status post PTCA with YUDY   -medically optimized with aspirin, Plavix, beta-blocker   -not on a statin secondary to intolerance  -on isosorbide, no complaints of angina   -followed by [facility]      3. Essential (primary) hypertension  Assessment & Plan:  -well controlled, reducing Coreg to 6.25 mg p.o. b.i.d. since patient was taking the 12.5 mg only daily rather than b.i.d. dosing   -low-sodium diet         4. Mixed hyperlipidemia  Assessment & Plan:  -patient is intolerant to statins      5. Myalgia due to statin  Assessment & Plan:  -on red yeast rice extract, intolerant to statins      Other orders  -     carvediloL (COREG) 6.25 MG tablet; Take 1 tablet (6.25 mg total) by mouth 2 (two) times daily with meals.  Dispense: 180 tablet; Refill: 3  -     pantoprazole (PROTONIX) 40 MG tablet; Take 1 tablet (40 mg total) by mouth once daily.  Dispense: 90 tablet; Refill: 1  -     isosorbide mononitrate (IMDUR) 30 MG 24 hr tablet; Take 1 tablet (30 mg total) by mouth once daily.  Dispense: 90 tablet; Refill: 3

## 2023-06-01 NOTE — ASSESSMENT & PLAN NOTE
-patient advised on the importance of avoiding excessive carbohydrates and sugary food intake and having some form of routine aerobic exercise on a regular basis.  -on glyburide metformin 2.5-500 mg p.o. b.i.d.

## 2023-06-01 NOTE — ASSESSMENT & PLAN NOTE
-well controlled, reducing Coreg to 6.25 mg p.o. b.i.d. since patient was taking the 12.5 mg only daily rather than b.i.d. dosing   -low-sodium diet

## 2023-06-01 NOTE — ASSESSMENT & PLAN NOTE
-status post PTCA with YUDY   -medically optimized with aspirin, Plavix, beta-blocker   -not on a statin secondary to intolerance  -on isosorbide, no complaints of angina   -followed by [facility]

## 2023-06-08 ENCOUNTER — HOSPITAL ENCOUNTER (OUTPATIENT)
Dept: RADIOLOGY | Facility: CLINIC | Age: 78
Discharge: HOME | End: 2023-06-08
Attending: ORTHOPAEDIC SURGERY
Payer: MEDICARE

## 2023-06-08 ENCOUNTER — OFFICE VISIT (OUTPATIENT)
Dept: ORTHOPEDICS | Facility: CLINIC | Age: 78
End: 2023-06-08
Payer: MEDICARE

## 2023-06-08 VITALS
BODY MASS INDEX: 31.72 KG/M2 | WEIGHT: 168 LBS | HEIGHT: 61 IN | SYSTOLIC BLOOD PRESSURE: 143 MMHG | DIASTOLIC BLOOD PRESSURE: 74 MMHG | HEART RATE: 92 BPM

## 2023-06-08 DIAGNOSIS — Z47.1 AFTERCARE FOLLOWING RIGHT KNEE JOINT REPLACEMENT SURGERY: ICD-10-CM

## 2023-06-08 DIAGNOSIS — Z47.1 AFTERCARE FOLLOWING RIGHT KNEE JOINT REPLACEMENT SURGERY: Primary | ICD-10-CM

## 2023-06-08 DIAGNOSIS — Z96.651 AFTERCARE FOLLOWING RIGHT KNEE JOINT REPLACEMENT SURGERY: Primary | ICD-10-CM

## 2023-06-08 DIAGNOSIS — Z96.651 AFTERCARE FOLLOWING RIGHT KNEE JOINT REPLACEMENT SURGERY: ICD-10-CM

## 2023-06-08 PROCEDURE — 99024 PR POST-OP FOLLOW-UP VISIT: ICD-10-PCS | Mod: POP,,, | Performed by: ORTHOPAEDIC SURGERY

## 2023-06-08 PROCEDURE — 73562 XR KNEE 3 VIEW RIGHT: ICD-10-PCS | Mod: RT,,, | Performed by: ORTHOPAEDIC SURGERY

## 2023-06-08 PROCEDURE — 73562 X-RAY EXAM OF KNEE 3: CPT | Mod: RT,,, | Performed by: ORTHOPAEDIC SURGERY

## 2023-06-08 PROCEDURE — 99024 POSTOP FOLLOW-UP VISIT: CPT | Mod: POP,,, | Performed by: ORTHOPAEDIC SURGERY

## 2023-06-08 NOTE — PROGRESS NOTES
Chief Complaint:   Chief Complaint   Patient presents with    Right Knee - Follow-up     F/U for right TKA 4/17/23. Knee is doing good. Not having any pain. Has some sensitive spots. No swelling. No difficulty with mobility. Pt states no complaints.       History of present illness: Eve Altman is a 78 y.o. female, in the clinic today about 6 weeks status post right total knee.  Overall the patient is doing very well.  Ambulating without any assistance.  Has completed physical therapy.  States it is gotten her flexion up to 130° with therapy.  Denies any significant pain or discomfort.  Happy with the progress thus far.      Past Medical History:   Diagnosis Date    Chronic GERD     Coronary artery disease     Essential (primary) hypertension     H/O heart artery stent     HLD (hyperlipidemia)     Hypothyroidism, unspecified     IC (interstitial cystitis)     Osteoarthritis of right knee     Osteopenia     Stress incontinence (female) (male)     Type 2 diabetes mellitus without complications        Past Surgical History:   Procedure Laterality Date    Bone and/or joint imaging; whole body   06/24/2019    BUNIONECTOMY      CATARACT EXTRACTION W/  INTRAOCULAR LENS IMPLANT Bilateral     CHOLECYSTECTOMY      COLONOSCOPY  03/11/2013    CORONARY ANGIOPLASTY WITH STENT PLACEMENT      x 3    ESOPHAGEAL DILATION      ESOPHAGOGASTRODUODENOSCOPY  04/16/2019    ESOPHAGOGASTRODUODENOSCOPY  03/04/2013    HYSTERECTOMY      LUMBAR SPINE SURGERY  07/24/2019    OPEN REDUCTION AND INTERNAL FIXATION (ORIF) OF INJURY OF SHOULDER Right     ROBOTIC ARTHROPLASTY, KNEE Right 4/17/2023    Procedure: ROBOTIC ARTHROPLASTY, KNEE, TOTAL;  Surgeon: Yung Allen MD;  Location: [facility];  Service: Orthopedics;  Laterality: Right;    Suspension of bladder      x2    TUBAL LIGATION  1975       Current Outpatient Medications   Medication Sig    aspirin (ECOTRIN) 81 MG EC tablet Take 1 tablet (81 mg total) by mouth once daily. Increase to  twice a day for 6 weeks post-op for blood clot prevention.    carvediloL (COREG) 6.25 MG tablet Take 1 tablet (6.25 mg total) by mouth 2 (two) times daily with meals.    clopidogreL (PLAVIX) 75 mg tablet Take 75 mg by mouth once daily.    FLAXSEED OIL-OMEGA 3,6,9 ORAL Take 1 capsule by mouth 3 (three) times daily.    glyBURIDE-metformin (GLUCOVANCE) 2.5-500 mg per tablet Take 1 tablet by mouth 2 (two) times daily with meals.    isosorbide mononitrate (IMDUR) 30 MG 24 hr tablet Take 1 tablet (30 mg total) by mouth once daily.    nitroGLYCERIN (NITROSTAT) 0.4 MG SL tablet TAKE 1 TABLET (SUBLINGUAL) ONCE EVERY 5 MIN FOR 3 DOSES FOR CHEST PAIN IF NOT RELIEVED GO TO ER.    pantoprazole (PROTONIX) 40 MG tablet Take 1 tablet (40 mg total) by mouth once daily.    red yeast rice 600 mg Cap Take 1 tablet by mouth Daily.    zinc acetate 25 mg (zinc) Cap Take 25 mg by mouth once daily.     No current facility-administered medications for this visit.       Review of patient's allergies indicates:   Allergen Reactions    Adhesive Dermatitis    Niacin Shortness Of Breath     Other reaction(s): UNKNOWN  Redness and SOB    Lisinopril Other (See Comments)    Latex      Other reaction(s): UNKNOWN       Family History   Problem Relation Age of Onset    Heart attack Mother     Heart disease Mother     Diabetes Father     Cancer Sister     Idiopathic pulmonary fibrosis Brother     Cancer Brother         I P F       Social History     Socioeconomic History    Marital status:    Tobacco Use    Smoking status: Never     Passive exposure: Never    Smokeless tobacco: Never   Substance and Sexual Activity    Alcohol use: Not Currently    Drug use: Never    Sexual activity: Yes     Partners: Female         Review of Systems:    Denies fevers, chills, chest pain, shortness of breath. Comprehensive review of systems performed and otherwise negative except as noted in HPI      Physical Examination:    General: awake and alert, no acute  distress, healthy appearing  Head and Neck: Head atraumatic/normocephalic. Moist MM  CV: brisk cap refill  Lungs: non-labored breathing, w/o cough or SOB  Skin: no rashes present, warm to touch  Neuro: sensation grossly intact distall     Vital Signs:    Vitals:    06/08/23 1251   BP: (!) 143/74   Pulse: 92       Body mass index is 31.74 kg/m².    Examination right knee:    Incision clean dry and intact. No erythema or drainage or signs of infection.  Sensation intact distally to right foot  Positive FHL/EHL/gastrocsoleus/tib ant  Brisk capillary refill to right foot  No swelling or signs of DVT  Range of motion: extension at 0 and flexion at 125  Stable to varus valgus  Stable to anterior and posterior drawer    X-rays: 3 views of the right knee reviewed. Patient's implants appear well fixed. No signs of loosening or subsidence noted.     Assessment::post op status post R TKA    Plan:  Patient is 6 weeks status post right total knee.  Overall the patient is doing well.  Her knee is stable on examination x-rays today here in clinic.  No current restrictions on her knee.  Educated that she can stop taking aspirin twice a day and go down to once a day.  She does state understanding. We did discuss the current recommendations regarding antibiotic prophylaxis prior to any dental work or colonoscopies. Patient is aware of this and will contact her office should they need any prescriptions for antibiotics called in. Follow-up in 2 months for repeat evaluation. All questions and concerns were addressed. The patient understands and agrees with the plan of care.      This note was created using [employer] voice recognition software that occasionally misinterpreted phrases or words.    Consult note is delivered via Epic messaging service.

## 2023-07-31 ENCOUNTER — TELEPHONE (OUTPATIENT)
Dept: ORTHOPEDICS | Facility: CLINIC | Age: 78
End: 2023-07-31
Payer: MEDICARE

## 2023-09-05 ENCOUNTER — OFFICE VISIT (OUTPATIENT)
Dept: ORTHOPEDICS | Facility: CLINIC | Age: 78
End: 2023-09-05
Payer: MEDICARE

## 2023-09-05 VITALS
BODY MASS INDEX: 31.91 KG/M2 | TEMPERATURE: 98 F | HEART RATE: 93 BPM | HEIGHT: 61 IN | WEIGHT: 169 LBS | SYSTOLIC BLOOD PRESSURE: 147 MMHG | DIASTOLIC BLOOD PRESSURE: 72 MMHG

## 2023-09-05 DIAGNOSIS — Z96.651 AFTERCARE FOLLOWING RIGHT KNEE JOINT REPLACEMENT SURGERY: Primary | ICD-10-CM

## 2023-09-05 DIAGNOSIS — Z47.1 AFTERCARE FOLLOWING RIGHT KNEE JOINT REPLACEMENT SURGERY: Primary | ICD-10-CM

## 2023-09-05 PROCEDURE — 99213 OFFICE O/P EST LOW 20 MIN: CPT | Mod: ,,, | Performed by: ORTHOPAEDIC SURGERY

## 2023-09-05 PROCEDURE — 99213 PR OFFICE/OUTPT VISIT, EST, LEVL III, 20-29 MIN: ICD-10-PCS | Mod: ,,, | Performed by: ORTHOPAEDIC SURGERY

## 2023-09-05 NOTE — PROGRESS NOTES
Chief Complaint: No chief complaint on file.      History of present illness:  68-year-old female presents today for and a half months out from total knee arthroplasty.  Overall patient is doing well.  Her knee is improving.  She sees good relief of her symptoms from preoperative levels.  She is happy with her recovery thus far.    Past Medical History:   Diagnosis Date    Chronic GERD     Coronary artery disease     Essential (primary) hypertension     H/O heart artery stent     HLD (hyperlipidemia)     Hypothyroidism, unspecified     IC (interstitial cystitis)     Osteoarthritis of right knee     Osteopenia     Stress incontinence (female) (male)     Type 2 diabetes mellitus without complications        Past Surgical History:   Procedure Laterality Date    Bone and/or joint imaging; whole body   06/24/2019    BUNIONECTOMY      CATARACT EXTRACTION W/  INTRAOCULAR LENS IMPLANT Bilateral     CHOLECYSTECTOMY      COLONOSCOPY  03/11/2013    CORONARY ANGIOPLASTY WITH STENT PLACEMENT      x 3    ESOPHAGEAL DILATION      ESOPHAGOGASTRODUODENOSCOPY  04/16/2019    ESOPHAGOGASTRODUODENOSCOPY  03/04/2013    HYSTERECTOMY      LUMBAR SPINE SURGERY  07/24/2019    OPEN REDUCTION AND INTERNAL FIXATION (ORIF) OF INJURY OF SHOULDER Right     ROBOTIC ARTHROPLASTY, KNEE Right 4/17/2023    Procedure: ROBOTIC ARTHROPLASTY, KNEE, TOTAL;  Surgeon: Yung Allen MD;  Location: [facility];  Service: Orthopedics;  Laterality: Right;    Suspension of bladder      x2    TUBAL LIGATION  1975       Current Outpatient Medications   Medication Sig    blood-glucose meter (TRUE METRIX GLUCOSE METER) Misc TEST ONCE DAILY    carvediloL (COREG) 6.25 MG tablet Take 1 tablet (6.25 mg total) by mouth 2 (two) times daily with meals.    clopidogreL (PLAVIX) 75 mg tablet Take 75 mg by mouth once daily.    FLAXSEED OIL-OMEGA 3,6,9 ORAL Take 1 capsule by mouth 3 (three) times daily.    glyBURIDE-metformin (GLUCOVANCE) 2.5-500 mg per tablet Take 1 tablet by  mouth 2 (two) times daily with meals.    isosorbide mononitrate (IMDUR) 30 MG 24 hr tablet Take 1 tablet (30 mg total) by mouth once daily.    nitroGLYCERIN (NITROSTAT) 0.4 MG SL tablet TAKE 1 TABLET (SUBLINGUAL) ONCE EVERY 5 MIN FOR 3 DOSES FOR CHEST PAIN IF NOT RELIEVED GO TO ER.    pantoprazole (PROTONIX) 40 MG tablet Take 1 tablet (40 mg total) by mouth once daily.    red yeast rice 600 mg Cap Take 1 tablet by mouth Daily.    TRUE METRIX GLUCOSE TEST STRIP Strp CHECK BLOOD GLUCOSE ONCE DAILY    TRUEPLUS LANCETS 28 gauge Misc CHECK ONCE DAILY    zinc acetate 25 mg (zinc) Cap Take 25 mg by mouth once daily.    aspirin (ECOTRIN) 81 MG EC tablet Take 1 tablet (81 mg total) by mouth once daily. Increase to twice a day for 6 weeks post-op for blood clot prevention.     No current facility-administered medications for this visit.       Review of patient's allergies indicates:   Allergen Reactions    Adhesive Dermatitis    Niacin Shortness Of Breath     Other reaction(s): UNKNOWN  Redness and SOB    Lisinopril Other (See Comments)    Latex      Other reaction(s): UNKNOWN       Family History   Problem Relation Age of Onset    Heart attack Mother     Heart disease Mother     Diabetes Father     Cancer Sister     Idiopathic pulmonary fibrosis Brother     Cancer Brother         I P F       Social History     Socioeconomic History    Marital status:    Tobacco Use    Smoking status: Never     Passive exposure: Never    Smokeless tobacco: Never   Substance and Sexual Activity    Alcohol use: Not Currently    Drug use: Never    Sexual activity: Yes     Partners: Female           Review of Systems:    Constitution: Negative for chills, fever, and sweats.  Negative for unexplained weight loss.    HENT:  Negative for headaches and blurry vision.    Cardiovascular:Negative for chest pain or irregular heart beat. Negative for hypertension.    Respiratory:  Negative for cough and shortness of breath.    Gastrointestinal:  Negative for abdominal pain, heartburn, melena, nausea, and vomitting.    Genitourinary:  Negative bladder incontinence and dysuria.    Musculoskeletal:  See HPI    Neurological: Negative for numbness.    Psychiatric/Behavioral: Negative for depression.  The patient is not nervous/anxious.      Endocrine: Negative for polyuria    Hematologic/Lymphatic: Negative for bleeding problem.  Does not bruise/bleed easily.    Skin: Negative for poor would healing and rash      Physical Examination:    Vital Signs:    Vitals:    09/05/23 1522   BP: (!) 147/72   Pulse: 93   Temp: 97.6 °F (36.4 °C)       Body mass index is 31.93 kg/m².    General: No acute distress, alert and oriented, healthy appearing    HEENT: Head is atraumatic, mucous membranes are moist    Neck: Supples, no JVD    Cardiovascular: Palpable dorsalis pedis and posterior tibial pulses, regular rate and rhythm to those pulses    Lungs: Breathing non-labored    Skin: no rashes appreciated    Neurologic: Can flex and extend knees, ankles, and toes. Sensation is grossly intact    Right knee:  Incision clean and dry.  Brisk cap refill distally.  Sensation intact distally.  Range of motion of the knee is from 0-120 degrees.    X-rays:      Assessment::  Status post right total knee arthroplasty    Plan:  Overall patient is doing well.  She is happy with her recovery and pain relief.  Plan to see her back in 9 months or sooner should there be any issues.  We did discuss antibiotic prophylaxis and she is aware of this.    This note was created using [employer] voice recognition software that occasionally misinterpreted phrases or words.    Consult note is delivered via Epic messaging service.

## 2023-09-19 DIAGNOSIS — Z79.4 TYPE 2 DIABETES MELLITUS WITHOUT COMPLICATION, WITH LONG-TERM CURRENT USE OF INSULIN: Primary | ICD-10-CM

## 2023-09-19 DIAGNOSIS — E11.9 TYPE 2 DIABETES MELLITUS WITHOUT COMPLICATION, WITH LONG-TERM CURRENT USE OF INSULIN: Primary | ICD-10-CM

## 2023-09-25 ENCOUNTER — TELEPHONE (OUTPATIENT)
Dept: INTERNAL MEDICINE | Facility: CLINIC | Age: 78
End: 2023-09-25
Payer: MEDICARE

## 2023-09-26 ENCOUNTER — LAB VISIT (OUTPATIENT)
Dept: LAB | Facility: HOSPITAL | Age: 78
End: 2023-09-26
Attending: INTERNAL MEDICINE
Payer: MEDICARE

## 2023-09-26 DIAGNOSIS — Z79.4 TYPE 2 DIABETES MELLITUS WITHOUT COMPLICATION, WITH LONG-TERM CURRENT USE OF INSULIN: ICD-10-CM

## 2023-09-26 DIAGNOSIS — E11.9 TYPE 2 DIABETES MELLITUS WITHOUT COMPLICATION, WITH LONG-TERM CURRENT USE OF INSULIN: ICD-10-CM

## 2023-09-26 LAB
EST. AVERAGE GLUCOSE BLD GHB EST-MCNC: 157.1 MG/DL
HBA1C MFR BLD: 7.1 %

## 2023-09-26 PROCEDURE — 83036 HEMOGLOBIN GLYCOSYLATED A1C: CPT

## 2023-09-26 PROCEDURE — 36415 COLL VENOUS BLD VENIPUNCTURE: CPT

## 2023-10-02 ENCOUNTER — OFFICE VISIT (OUTPATIENT)
Dept: INTERNAL MEDICINE | Facility: CLINIC | Age: 78
End: 2023-10-02
Payer: MEDICARE

## 2023-10-02 VITALS
HEIGHT: 61 IN | DIASTOLIC BLOOD PRESSURE: 82 MMHG | OXYGEN SATURATION: 96 % | HEART RATE: 89 BPM | SYSTOLIC BLOOD PRESSURE: 158 MMHG | TEMPERATURE: 98 F | WEIGHT: 171 LBS | BODY MASS INDEX: 32.28 KG/M2

## 2023-10-02 DIAGNOSIS — I10 ESSENTIAL (PRIMARY) HYPERTENSION: Chronic | ICD-10-CM

## 2023-10-02 DIAGNOSIS — E11.9 TYPE 2 DIABETES MELLITUS WITHOUT COMPLICATION, WITHOUT LONG-TERM CURRENT USE OF INSULIN: ICD-10-CM

## 2023-10-02 DIAGNOSIS — K21.9 CHRONIC GERD: ICD-10-CM

## 2023-10-02 DIAGNOSIS — I25.10 CORONARY ARTERY DISEASE INVOLVING NATIVE CORONARY ARTERY OF NATIVE HEART WITHOUT ANGINA PECTORIS: Chronic | ICD-10-CM

## 2023-10-02 DIAGNOSIS — E11.29 MICROALBUMINURIA DUE TO TYPE 2 DIABETES MELLITUS: ICD-10-CM

## 2023-10-02 DIAGNOSIS — Z12.31 BREAST CANCER SCREENING BY MAMMOGRAM: Primary | ICD-10-CM

## 2023-10-02 DIAGNOSIS — E78.2 MIXED HYPERLIPIDEMIA: Chronic | ICD-10-CM

## 2023-10-02 DIAGNOSIS — R80.9 MICROALBUMINURIA DUE TO TYPE 2 DIABETES MELLITUS: ICD-10-CM

## 2023-10-02 PROCEDURE — 99214 PR OFFICE/OUTPT VISIT, EST, LEVL IV, 30-39 MIN: ICD-10-PCS | Mod: ,,, | Performed by: INTERNAL MEDICINE

## 2023-10-02 PROCEDURE — 99214 OFFICE O/P EST MOD 30 MIN: CPT | Mod: ,,, | Performed by: INTERNAL MEDICINE

## 2023-10-02 RX ORDER — LOSARTAN POTASSIUM 25 MG/1
25 TABLET ORAL DAILY
Qty: 90 TABLET | Refills: 3 | Status: SHIPPED | OUTPATIENT
Start: 2023-10-02 | End: 2024-02-08

## 2023-10-02 NOTE — ASSESSMENT & PLAN NOTE
-HGB A1c noted to be at 7.1   -on glyburide metformin 2.5-500 mg p.o. b.i.d.  -patient advised on the importance of avoiding excessive carbohydrates and sugary food intake and having some form of routine aerobic exercise on a regular basis.  -adding Ozempic, will follow-up on insurance approval

## 2023-10-02 NOTE — ASSESSMENT & PLAN NOTE
Suboptimally controlled.   Continue Coreg 6.25 mg p.o. b.i.d., add losartan 25 mg p.o. daily, also on isosorbide which will be continued  Low Sodium Diet (DASH Diet - Less than 2 grams of sodium per day).  Monitor blood pressure daily and log. Report consistent numbers greater than 140/90.  Maintain healthy weight with goal BMI <30. Exercise 30 minutes per day, 5 days per week.

## 2023-10-02 NOTE — ASSESSMENT & PLAN NOTE
-currently on beta blocker, aspirin and Plavix, continue  -adding losartan, follow-up   -patient allergic to statins secondary to myalgias, on red yeast rice extract

## 2023-10-02 NOTE — PROGRESS NOTES
Subjective:      Patient ID: Eve Altman is a 78 y.o. female.    Chief Complaint: Follow-up (4 month diabetes)    Ms. Prado is a 78-year-old female who is here today for follow-up.  Her medical comorbidities are significant for hypertension, hyperlipidemia, CAD status post PTCA to LAD and obtuse marginal, diabetes mellitus type 2, interstitial cystitis.    Currently doing well with no acute needs or complaints .  Recovering well after her right TKA.    Blood pressure was noted to be elevated today.  We discussed starting an ARB since patient currently only on a beta blocker.    Also diabetes is not very well controlled with hemoglobin A1c of 7.1.  Currently on metformin glyburide, trying to get her started on Ozempic.  If co-pay is too high, may have to consider another medication       Social history:  Negative   Family history:  Sister with esophageal cancer questionable, brother with pulmonary fibrosis   Surgical history: Noted in detail     Cardiology: Dr Ivan  GI: Dr. Kang     DEXA:  2022, request records  MM2022  CRS:  2013  Pneumonia: Up-to-date  Diabetic eye exam: Dr. Cherry    The patient's Health Maintenance was reviewed and the following appears to be due at this time:   Health Maintenance Due   Topic Date Due    Hepatitis C Screening  Never done    Eye Exam  2023        Past Medical History:  Past Medical History:   Diagnosis Date    Chronic GERD     Coronary artery disease     Essential (primary) hypertension     H/O heart artery stent     HLD (hyperlipidemia)     Hypothyroidism, unspecified     IC (interstitial cystitis)     Osteoarthritis of right knee     Osteopenia     Stress incontinence (female) (male)     Type 2 diabetes mellitus without complications      Past Surgical History:   Procedure Laterality Date    Bone and/or joint imaging; whole body   2019    BUNIONECTOMY      CATARACT EXTRACTION W/  INTRAOCULAR LENS IMPLANT Bilateral     CHOLECYSTECTOMY       COLONOSCOPY  03/11/2013    CORONARY ANGIOPLASTY WITH STENT PLACEMENT      x 3    ESOPHAGEAL DILATION      ESOPHAGOGASTRODUODENOSCOPY  04/16/2019    ESOPHAGOGASTRODUODENOSCOPY  03/04/2013    HYSTERECTOMY      LUMBAR SPINE SURGERY  07/24/2019    OPEN REDUCTION AND INTERNAL FIXATION (ORIF) OF INJURY OF SHOULDER Right     ROBOTIC ARTHROPLASTY, KNEE Right 4/17/2023    Procedure: ROBOTIC ARTHROPLASTY, KNEE, TOTAL;  Surgeon: Yung Allen MD;  Location: [facility];  Service: Orthopedics;  Laterality: Right;    Suspension of bladder      x2    TUBAL LIGATION  1975     Review of patient's allergies indicates:   Allergen Reactions    Adhesive Dermatitis    Niacin Shortness Of Breath     Other reaction(s): UNKNOWN  Redness and SOB  Other reaction(s): Not available    Lisinopril Other (See Comments)    Latex Other (See Comments)     Other reaction(s): UNKNOWN    Statins-hmg-coa reductase inhibitors Other (See Comments)     Social History     Socioeconomic History    Marital status:    Tobacco Use    Smoking status: Never     Passive exposure: Never    Smokeless tobacco: Never   Substance and Sexual Activity    Alcohol use: Not Currently    Drug use: Never    Sexual activity: Yes     Partners: Female     Social Determinants of Health     Financial Resource Strain: Low Risk  (10/2/2023)    Overall Financial Resource Strain (CARDIA)     Difficulty of Paying Living Expenses: Not hard at all   Food Insecurity: No Food Insecurity (10/2/2023)    Hunger Vital Sign     Worried About Running Out of Food in the Last Year: Never true     Ran Out of Food in the Last Year: Never true   Transportation Needs: No Transportation Needs (10/2/2023)    PRAPARE - Transportation     Lack of Transportation (Medical): No     Lack of Transportation (Non-Medical): No   Physical Activity: Insufficiently Active (10/2/2023)    Exercise Vital Sign     Days of Exercise per Week: 3 days     Minutes of Exercise per Session: 20 min   Stress: No Stress  "Concern Present (10/2/2023)    [employer] - Occupational Stress Questionnaire     Feeling of Stress : Not at all   Social Connections: Socially Integrated (10/2/2023)    Social Connection and Isolation Panel [NHANES]     Frequency of Communication with Friends and Family: More than three times a week     Frequency of Social Gatherings with Friends and Family: More than three times a week     Attends Jain Services: More than 4 times per year     Active Member of Clubs or Organizations: Yes     Attends Club or Organization Meetings: More than 4 times per year     Marital Status:    Housing Stability: Low Risk  (10/2/2023)    Housing Stability Vital Sign     Unable to Pay for Housing in the Last Year: No     Number of Places Lived in the Last Year: 1     Unstable Housing in the Last Year: No     Family History   Problem Relation Age of Onset    Heart attack Mother     Heart disease Mother     Diabetes Father     Cancer Sister     Idiopathic pulmonary fibrosis Brother     Cancer Brother         I P F       Review of Systems    A comprehensive review of systems was performed and is negative except for that stated above  Objective:   BP (!) 158/82 (BP Location: Left arm, Patient Position: Sitting, BP Method: Small (Manual))   Pulse 89   Temp 98 °F (36.7 °C) (Temporal)   Ht 5' 1" (1.549 m)   Wt 77.6 kg (171 lb)   SpO2 96%   BMI 32.31 kg/m²     Physical Exam  Constitutional:       Appearance: Normal appearance.   HENT:      Head: Normocephalic and atraumatic.      Nose: Nose normal.      Mouth/Throat:      Mouth: Mucous membranes are moist.      Pharynx: Oropharynx is clear.   Eyes:      Extraocular Movements: Extraocular movements intact.      Pupils: Pupils are equal, round, and reactive to light.   Cardiovascular:      Rate and Rhythm: Normal rate and regular rhythm.      Pulses: Normal pulses.   Pulmonary:      Effort: Pulmonary effort is normal.      Breath sounds: Normal " breath sounds.   Abdominal:      General: Bowel sounds are normal.      Palpations: Abdomen is soft.   Musculoskeletal:         General: Normal range of motion.      Cervical back: Normal range of motion and neck supple.   Skin:     General: Skin is warm.   Neurological:      General: No focal deficit present.      Mental Status: She is alert and oriented to person, place, and time. Mental status is at baseline.   Psychiatric:         Mood and Affect: Mood normal.       Assessment/ Plan:   1. Breast cancer screening by mammogram  -     Mammo Digital Screening Bilat; Future; Expected date: 10/02/2023    2. Mixed hyperlipidemia  Assessment & Plan:  -patient currently not on a statin, check labs at next visit and follow-up on results      3. Essential (primary) hypertension  Assessment & Plan:  Suboptimally controlled.   Continue Coreg 6.25 mg p.o. b.i.d., add losartan 25 mg p.o. daily, also on isosorbide which will be continued  Low Sodium Diet (DASH Diet - Less than 2 grams of sodium per day).  Monitor blood pressure daily and log. Report consistent numbers greater than 140/90.  Maintain healthy weight with goal BMI <30. Exercise 30 minutes per day, 5 days per week.        4. Coronary artery disease involving native coronary artery of native heart without angina pectoris  Assessment & Plan:  -currently on beta blocker, aspirin and Plavix, continue  -adding losartan, follow-up   -patient allergic to statins secondary to myalgias, on red yeast rice extract      5. Type 2 diabetes mellitus without complication, without long-term current use of insulin  Assessment & Plan:  -HGB A1c noted to be at 7.1   -on glyburide metformin 2.5-500 mg p.o. b.i.d.  -patient advised on the importance of avoiding excessive carbohydrates and sugary food intake and having some form of routine aerobic exercise on a regular basis.  -adding Ozempic, will follow-up on insurance approval    Orders:  -     semaglutide (OZEMPIC) 0.25 mg or 0.5 mg  (2 mg/3 mL) pen injector; Inject 0.25 mg into the skin every 7 days.  Dispense: 3 mL; Refill: 5    6. Chronic GERD  Assessment & Plan:  -patient advised to avoid foods that trigger acid reflux and he had at least 2 hours before bedtime.      7. Microalbuminuria due to type 2 diabetes mellitus  Assessment & Plan:  -adding ARB      Other orders  -     losartan (COZAAR) 25 MG tablet; Take 1 tablet (25 mg total) by mouth once daily.  Dispense: 90 tablet; Refill: 3

## 2023-10-03 ENCOUNTER — HOSPITAL ENCOUNTER (OUTPATIENT)
Dept: RADIOLOGY | Facility: HOSPITAL | Age: 78
Discharge: HOME | End: 2023-10-03
Attending: INTERNAL MEDICINE
Payer: MEDICARE

## 2023-10-03 DIAGNOSIS — Z12.31 BREAST CANCER SCREENING BY MAMMOGRAM: ICD-10-CM

## 2023-10-03 PROCEDURE — 77063 BREAST TOMOSYNTHESIS BI: CPT | Mod: 26,,, | Performed by: RADIOLOGY

## 2023-10-03 PROCEDURE — 77067 SCR MAMMO BI INCL CAD: CPT | Mod: 26,,, | Performed by: RADIOLOGY

## 2023-10-03 PROCEDURE — 77067 SCR MAMMO BI INCL CAD: CPT | Mod: TC

## 2023-10-03 PROCEDURE — 77063 MAMMO DIGITAL SCREENING BILAT WITH TOMO: ICD-10-PCS | Mod: 26,,, | Performed by: RADIOLOGY

## 2023-10-03 PROCEDURE — 77067 MAMMO DIGITAL SCREENING BILAT WITH TOMO: ICD-10-PCS | Mod: 26,,, | Performed by: RADIOLOGY

## 2023-10-09 ENCOUNTER — PATIENT MESSAGE (OUTPATIENT)
Dept: ADMINISTRATIVE | Facility: HOSPITAL | Age: 78
End: 2023-10-09
Payer: MEDICARE

## 2023-10-18 ENCOUNTER — HOSPITAL ENCOUNTER (OUTPATIENT)
Dept: RADIOLOGY | Facility: HOSPITAL | Age: 78
Discharge: HOME | End: 2023-10-18
Attending: INTERNAL MEDICINE
Payer: MEDICARE

## 2023-10-18 DIAGNOSIS — R92.8 ABNORMAL MAMMOGRAM: ICD-10-CM

## 2023-10-18 PROCEDURE — 77061 BREAST TOMOSYNTHESIS UNI: CPT | Mod: 26,LT,, | Performed by: STUDENT IN AN ORGANIZED HEALTH CARE EDUCATION/TRAINING PROGRAM

## 2023-10-18 PROCEDURE — 77065 DX MAMMO INCL CAD UNI: CPT | Mod: 26,LT,, | Performed by: STUDENT IN AN ORGANIZED HEALTH CARE EDUCATION/TRAINING PROGRAM

## 2023-10-18 PROCEDURE — 77061 MAMMO DIGITAL DIAGNOSTIC LEFT WITH TOMO: ICD-10-PCS | Mod: 26,LT,, | Performed by: STUDENT IN AN ORGANIZED HEALTH CARE EDUCATION/TRAINING PROGRAM

## 2023-10-18 PROCEDURE — 77061 BREAST TOMOSYNTHESIS UNI: CPT | Mod: TC,LT

## 2023-10-18 PROCEDURE — 77065 MAMMO DIGITAL DIAGNOSTIC LEFT WITH TOMO: ICD-10-PCS | Mod: 26,LT,, | Performed by: STUDENT IN AN ORGANIZED HEALTH CARE EDUCATION/TRAINING PROGRAM

## 2023-11-28 ENCOUNTER — HOSPITAL ENCOUNTER (OUTPATIENT)
Dept: RADIOLOGY | Facility: HOSPITAL | Age: 78
Discharge: HOME | End: 2023-11-28
Attending: INTERNAL MEDICINE
Payer: MEDICARE

## 2023-11-28 DIAGNOSIS — R06.02 SHORTNESS OF BREATH: ICD-10-CM

## 2023-11-28 DIAGNOSIS — R06.02 SHORTNESS OF BREATH: Primary | ICD-10-CM

## 2023-11-28 PROCEDURE — 71046 X-RAY EXAM CHEST 2 VIEWS: CPT | Mod: TC

## 2023-12-06 DIAGNOSIS — E11.9 TYPE 2 DIABETES MELLITUS WITHOUT COMPLICATION, WITHOUT LONG-TERM CURRENT USE OF INSULIN: Primary | Chronic | ICD-10-CM

## 2023-12-06 RX ORDER — GLYBURIDE-METFORMIN HYDROCHLORIDE 2.5; 5 MG/1; MG/1
TABLET ORAL
Qty: 180 TABLET | Refills: 3 | Status: SHIPPED | OUTPATIENT
Start: 2023-12-06

## 2023-12-29 LAB
LEFT EYE DM RETINOPATHY: NEGATIVE
RIGHT EYE DM RETINOPATHY: NEGATIVE

## 2024-01-05 DIAGNOSIS — K21.9 CHRONIC GERD: Primary | ICD-10-CM

## 2024-01-05 RX ORDER — PANTOPRAZOLE SODIUM 40 MG/1
40 TABLET, DELAYED RELEASE ORAL
Qty: 30 TABLET | Refills: 6 | Status: SHIPPED | OUTPATIENT
Start: 2024-01-05

## 2024-01-08 ENCOUNTER — PATIENT MESSAGE (OUTPATIENT)
Dept: ADMINISTRATIVE | Facility: HOSPITAL | Age: 79
End: 2024-01-08
Payer: MEDICARE

## 2024-01-24 DIAGNOSIS — Z79.899 ENCOUNTER FOR LONG-TERM (CURRENT) USE OF MEDICATIONS: ICD-10-CM

## 2024-01-24 DIAGNOSIS — E11.9 TYPE 2 DIABETES MELLITUS WITHOUT COMPLICATION, WITHOUT LONG-TERM CURRENT USE OF INSULIN: ICD-10-CM

## 2024-01-24 DIAGNOSIS — Z13.21 SCREENING FOR ENDOCRINE, NUTRITIONAL, METABOLIC AND IMMUNITY DISORDER: ICD-10-CM

## 2024-01-24 DIAGNOSIS — Z13.228 SCREENING FOR ENDOCRINE, NUTRITIONAL, METABOLIC AND IMMUNITY DISORDER: ICD-10-CM

## 2024-01-24 DIAGNOSIS — Z13.29 SCREENING FOR ENDOCRINE, NUTRITIONAL, METABOLIC AND IMMUNITY DISORDER: ICD-10-CM

## 2024-01-24 DIAGNOSIS — Z13.89 SCREENING FOR CARDIOVASCULAR, RESPIRATORY, AND GENITOURINARY DISEASES: ICD-10-CM

## 2024-01-24 DIAGNOSIS — Z13.6 SCREENING FOR CARDIOVASCULAR, RESPIRATORY, AND GENITOURINARY DISEASES: ICD-10-CM

## 2024-01-24 DIAGNOSIS — Z13.0 SCREENING FOR ENDOCRINE, NUTRITIONAL, METABOLIC AND IMMUNITY DISORDER: ICD-10-CM

## 2024-01-24 DIAGNOSIS — E78.2 MIXED HYPERLIPIDEMIA: Primary | ICD-10-CM

## 2024-01-24 DIAGNOSIS — I10 HYPERTENSION, UNSPECIFIED TYPE: ICD-10-CM

## 2024-01-24 DIAGNOSIS — Z13.83 SCREENING FOR CARDIOVASCULAR, RESPIRATORY, AND GENITOURINARY DISEASES: ICD-10-CM

## 2024-02-02 ENCOUNTER — LAB VISIT (OUTPATIENT)
Dept: LAB | Facility: HOSPITAL | Age: 79
End: 2024-02-02
Attending: INTERNAL MEDICINE
Payer: MEDICARE

## 2024-02-02 DIAGNOSIS — Z13.89 SCREENING FOR CARDIOVASCULAR, RESPIRATORY, AND GENITOURINARY DISEASES: ICD-10-CM

## 2024-02-02 DIAGNOSIS — E78.2 MIXED HYPERLIPIDEMIA: ICD-10-CM

## 2024-02-02 DIAGNOSIS — Z13.228 SCREENING FOR ENDOCRINE, NUTRITIONAL, METABOLIC AND IMMUNITY DISORDER: ICD-10-CM

## 2024-02-02 DIAGNOSIS — E11.9 TYPE 2 DIABETES MELLITUS WITHOUT COMPLICATION, WITHOUT LONG-TERM CURRENT USE OF INSULIN: ICD-10-CM

## 2024-02-02 DIAGNOSIS — Z13.21 SCREENING FOR ENDOCRINE, NUTRITIONAL, METABOLIC AND IMMUNITY DISORDER: ICD-10-CM

## 2024-02-02 DIAGNOSIS — Z79.899 ENCOUNTER FOR LONG-TERM (CURRENT) USE OF MEDICATIONS: ICD-10-CM

## 2024-02-02 DIAGNOSIS — Z13.6 SCREENING FOR CARDIOVASCULAR, RESPIRATORY, AND GENITOURINARY DISEASES: ICD-10-CM

## 2024-02-02 DIAGNOSIS — I10 HYPERTENSION, UNSPECIFIED TYPE: ICD-10-CM

## 2024-02-02 DIAGNOSIS — Z13.29 SCREENING FOR ENDOCRINE, NUTRITIONAL, METABOLIC AND IMMUNITY DISORDER: ICD-10-CM

## 2024-02-02 DIAGNOSIS — Z13.83 SCREENING FOR CARDIOVASCULAR, RESPIRATORY, AND GENITOURINARY DISEASES: ICD-10-CM

## 2024-02-02 DIAGNOSIS — Z13.0 SCREENING FOR ENDOCRINE, NUTRITIONAL, METABOLIC AND IMMUNITY DISORDER: ICD-10-CM

## 2024-02-02 LAB
ALBUMIN SERPL-MCNC: 3.8 G/DL (ref 3.4–4.8)
ALBUMIN/GLOB SERPL: 1.2 RATIO (ref 1.1–2)
ALP SERPL-CCNC: 95 UNIT/L (ref 40–150)
ALT SERPL-CCNC: 11 UNIT/L (ref 0–55)
AST SERPL-CCNC: 13 UNIT/L (ref 5–34)
BASOPHILS # BLD AUTO: 0.03 X10(3)/MCL
BASOPHILS NFR BLD AUTO: 0.4 %
BILIRUB SERPL-MCNC: 0.6 MG/DL
BUN SERPL-MCNC: 14.4 MG/DL (ref 9.8–20.1)
CALCIUM SERPL-MCNC: 9.4 MG/DL (ref 8.4–10.2)
CHLORIDE SERPL-SCNC: 103 MMOL/L (ref 98–107)
CHOLEST SERPL-MCNC: 199 MG/DL
CHOLEST/HDLC SERPL: 4 {RATIO} (ref 0–5)
CO2 SERPL-SCNC: 29 MMOL/L (ref 23–31)
CREAT SERPL-MCNC: 0.99 MG/DL (ref 0.55–1.02)
CREAT UR-MCNC: 116.8 MG/DL (ref 45–106)
EOSINOPHIL # BLD AUTO: 0.09 X10(3)/MCL (ref 0–0.9)
EOSINOPHIL NFR BLD AUTO: 1.3 %
ERYTHROCYTE [DISTWIDTH] IN BLOOD BY AUTOMATED COUNT: 16.4 % (ref 11.5–17)
EST. AVERAGE GLUCOSE BLD GHB EST-MCNC: 157.1 MG/DL
GFR SERPLBLD CREATININE-BSD FMLA CKD-EPI: 58 MLS/MIN/1.73/M2
GLOBULIN SER-MCNC: 3.1 GM/DL (ref 2.4–3.5)
GLUCOSE SERPL-MCNC: 111 MG/DL (ref 82–115)
HBA1C MFR BLD: 7.1 %
HCT VFR BLD AUTO: 36.2 % (ref 37–47)
HDLC SERPL-MCNC: 46 MG/DL (ref 35–60)
HGB BLD-MCNC: 10.6 G/DL (ref 12–16)
IMM GRANULOCYTES # BLD AUTO: 0.01 X10(3)/MCL (ref 0–0.04)
IMM GRANULOCYTES NFR BLD AUTO: 0.1 %
LDLC SERPL CALC-MCNC: 122 MG/DL (ref 50–140)
LYMPHOCYTES # BLD AUTO: 1.61 X10(3)/MCL (ref 0.6–4.6)
LYMPHOCYTES NFR BLD AUTO: 23.2 %
MCH RBC QN AUTO: 20.5 PG (ref 27–31)
MCHC RBC AUTO-ENTMCNC: 29.3 G/DL (ref 33–36)
MCV RBC AUTO: 70.2 FL (ref 80–94)
MICROALBUMIN UR-MCNC: 9.2 UG/ML
MICROALBUMIN/CREAT RATIO PNL UR: 7.9 MG/GM CR (ref 0–30)
MONOCYTES # BLD AUTO: 0.49 X10(3)/MCL (ref 0.1–1.3)
MONOCYTES NFR BLD AUTO: 7.1 %
NEUTROPHILS # BLD AUTO: 4.7 X10(3)/MCL (ref 2.1–9.2)
NEUTROPHILS NFR BLD AUTO: 67.9 %
NRBC BLD AUTO-RTO: 0 %
PLATELET # BLD AUTO: 358 X10(3)/MCL (ref 130–400)
PMV BLD AUTO: 9.9 FL (ref 7.4–10.4)
POTASSIUM SERPL-SCNC: 4.3 MMOL/L (ref 3.5–5.1)
PROT SERPL-MCNC: 6.9 GM/DL (ref 5.8–7.6)
RBC # BLD AUTO: 5.16 X10(6)/MCL (ref 4.2–5.4)
SODIUM SERPL-SCNC: 137 MMOL/L (ref 136–145)
TRIGL SERPL-MCNC: 154 MG/DL (ref 37–140)
TSH SERPL-ACNC: 2.46 UIU/ML (ref 0.35–4.94)
VLDLC SERPL CALC-MCNC: 31 MG/DL
WBC # SPEC AUTO: 6.93 X10(3)/MCL (ref 4.5–11.5)

## 2024-02-02 PROCEDURE — 83036 HEMOGLOBIN GLYCOSYLATED A1C: CPT

## 2024-02-02 PROCEDURE — 82043 UR ALBUMIN QUANTITATIVE: CPT

## 2024-02-02 PROCEDURE — 36415 COLL VENOUS BLD VENIPUNCTURE: CPT

## 2024-02-02 PROCEDURE — 85025 COMPLETE CBC W/AUTO DIFF WBC: CPT

## 2024-02-02 PROCEDURE — 80053 COMPREHEN METABOLIC PANEL: CPT

## 2024-02-02 PROCEDURE — 80061 LIPID PANEL: CPT

## 2024-02-02 PROCEDURE — 84443 ASSAY THYROID STIM HORMONE: CPT

## 2024-02-08 ENCOUNTER — OFFICE VISIT (OUTPATIENT)
Dept: INTERNAL MEDICINE | Facility: CLINIC | Age: 79
End: 2024-02-08
Payer: MEDICARE

## 2024-02-08 VITALS
WEIGHT: 176 LBS | BODY MASS INDEX: 33.23 KG/M2 | HEIGHT: 61 IN | DIASTOLIC BLOOD PRESSURE: 88 MMHG | HEART RATE: 93 BPM | OXYGEN SATURATION: 97 % | SYSTOLIC BLOOD PRESSURE: 138 MMHG

## 2024-02-08 DIAGNOSIS — I10 ESSENTIAL (PRIMARY) HYPERTENSION: Primary | Chronic | ICD-10-CM

## 2024-02-08 DIAGNOSIS — E78.2 MIXED HYPERLIPIDEMIA: Chronic | ICD-10-CM

## 2024-02-08 DIAGNOSIS — R80.9 MICROALBUMINURIA DUE TO TYPE 2 DIABETES MELLITUS: ICD-10-CM

## 2024-02-08 DIAGNOSIS — Z00.00 MEDICARE ANNUAL WELLNESS VISIT, SUBSEQUENT: ICD-10-CM

## 2024-02-08 DIAGNOSIS — E11.29 MICROALBUMINURIA DUE TO TYPE 2 DIABETES MELLITUS: ICD-10-CM

## 2024-02-08 DIAGNOSIS — N18.31 CHRONIC KIDNEY DISEASE, STAGE 3A: ICD-10-CM

## 2024-02-08 DIAGNOSIS — E11.9 TYPE 2 DIABETES MELLITUS WITHOUT COMPLICATION, WITHOUT LONG-TERM CURRENT USE OF INSULIN: Chronic | ICD-10-CM

## 2024-02-08 DIAGNOSIS — I25.118 ATHEROSCLEROTIC HEART DISEASE OF NATIVE CORONARY ARTERY WITH OTHER FORMS OF ANGINA PECTORIS: ICD-10-CM

## 2024-02-08 DIAGNOSIS — I25.10 CORONARY ARTERY DISEASE INVOLVING NATIVE CORONARY ARTERY OF NATIVE HEART WITHOUT ANGINA PECTORIS: Chronic | ICD-10-CM

## 2024-02-08 PROCEDURE — 1125F AMNT PAIN NOTED PAIN PRSNT: CPT | Mod: CPTII,,, | Performed by: INTERNAL MEDICINE

## 2024-02-08 PROCEDURE — 3288F FALL RISK ASSESSMENT DOCD: CPT | Mod: CPTII,,, | Performed by: INTERNAL MEDICINE

## 2024-02-08 PROCEDURE — G0439 PPPS, SUBSEQ VISIT: HCPCS | Mod: ,,, | Performed by: INTERNAL MEDICINE

## 2024-02-08 PROCEDURE — 3075F SYST BP GE 130 - 139MM HG: CPT | Mod: CPTII,,, | Performed by: INTERNAL MEDICINE

## 2024-02-08 PROCEDURE — 1159F MED LIST DOCD IN RCRD: CPT | Mod: CPTII,,, | Performed by: INTERNAL MEDICINE

## 2024-02-08 PROCEDURE — 1124F ACP DISCUSS-NO DSCNMKR DOCD: CPT | Mod: CPTII,,, | Performed by: INTERNAL MEDICINE

## 2024-02-08 PROCEDURE — 1160F RVW MEDS BY RX/DR IN RCRD: CPT | Mod: CPTII,,, | Performed by: INTERNAL MEDICINE

## 2024-02-08 PROCEDURE — 99214 OFFICE O/P EST MOD 30 MIN: CPT | Mod: 25,,, | Performed by: INTERNAL MEDICINE

## 2024-02-08 PROCEDURE — 3079F DIAST BP 80-89 MM HG: CPT | Mod: CPTII,,, | Performed by: INTERNAL MEDICINE

## 2024-02-08 PROCEDURE — 1101F PT FALLS ASSESS-DOCD LE1/YR: CPT | Mod: CPTII,,, | Performed by: INTERNAL MEDICINE

## 2024-02-08 RX ORDER — LOSARTAN POTASSIUM 25 MG/1
25 TABLET ORAL DAILY
COMMUNITY

## 2024-02-08 RX ORDER — ROPINIROLE 1 MG/1
2 TABLET, FILM COATED ORAL 3 TIMES DAILY
Qty: 90 TABLET | Refills: 3 | Status: SHIPPED | OUTPATIENT
Start: 2024-02-08

## 2024-02-08 RX ORDER — ROPINIROLE 1 MG/1
1 TABLET, FILM COATED ORAL DAILY
COMMUNITY
End: 2024-02-08 | Stop reason: SDUPTHER

## 2024-02-08 RX ORDER — CARVEDILOL 12.5 MG/1
12.5 TABLET ORAL 2 TIMES DAILY WITH MEALS
Qty: 180 TABLET | Refills: 3 | Status: SHIPPED | OUTPATIENT
Start: 2024-02-08 | End: 2025-02-07

## 2024-02-08 RX ORDER — TIRZEPATIDE 2.5 MG/.5ML
2.5 INJECTION, SOLUTION SUBCUTANEOUS
Qty: 4 PEN | Refills: 3 | Status: SHIPPED | OUTPATIENT
Start: 2024-02-08 | End: 2024-04-30

## 2024-02-08 NOTE — ASSESSMENT & PLAN NOTE
-increasing dose of Coreg 12.5 mg p.o. b.i.d. because of elevated blood pressure readings   -emphasized on importance of continuing her Cozaar 25 mg p.o. daily   -low-sodium diet

## 2024-02-08 NOTE — ASSESSMENT & PLAN NOTE
-medically optimized with aspirin, Plavix, beta-blocker and losartan, advised to continue  -unable to tolerate statins

## 2024-02-08 NOTE — PROGRESS NOTES
Patient ID: [id number]     Chief Complaint: Medicare AWV (Wellness/)      HPI:     Eve Altman is a 78 y.o. female here today for a Medicare Wellness. No other complaints today.     Ms. Prado is a 78-year-old female who is here today for follow-up and Medicare wellness visit..  Her medical comorbidities are significant for hypertension, hyperlipidemia, CAD status post PTCA to LAD and obtuse marginal, diabetes mellitus type 2, interstitial cystitis.    Currently doing well with no acute needs or complaints .  Somewhat anxious and concerned about her 's medical condition with prostate cancer and having to undergo 9 weeks of XRT etc..  Provided emotional support.      A separate E/M visit was performed for diabetes mellitus type 2 and hypertension  Patient used to be on Ozempic however currently off of it.  HGB A1c noted to be at 7.1.  We discussed getting started on Mounjaro to get better glycemic control as well as help with her weight loss journey.  Prescription was given and patient was advised to call if there are any issues with prescription coverage.  If able to obtain the prescription, we will scale back on her oral medication glyburide/metformin     Patient also noted to have elevated blood pressure readings for which her Coreg will be increased to 12.5 mg p.o. b.i.d..  She is emphasized on importance of continuing her losartan because of microalbuminuria     Social history:  Negative   Family history:  Sister with esophageal cancer questionable, brother with pulmonary fibrosis   Surgical history: Noted in detail     Cardiology: Dr Ivan  GI: Dr. Kang     DEXA:  2022, request records  [id number]  CRS:  2013  Pn: Up-to-date  Diabetic eye exam: Dr. Cherry  ----------------------------  Chronic GERD  Coronary artery disease  Essential (primary) hypertension  H/O heart artery stent  HLD (hyperlipidemia)  Hypothyroidism, unspecified  IC (interstitial cystitis)  Osteoarthritis of right  knee  Osteopenia  Stress incontinence (female) (male)  Type 2 diabetes mellitus without complications     Past Surgical History:   Procedure Laterality Date    Bone and/or joint imaging; whole body   06/24/2019    BUNIONECTOMY      CATARACT EXTRACTION W/  INTRAOCULAR LENS IMPLANT Bilateral     CHOLECYSTECTOMY      COLONOSCOPY  03/11/2013    CORONARY ANGIOPLASTY WITH STENT PLACEMENT      x 3    ESOPHAGEAL DILATION      ESOPHAGOGASTRODUODENOSCOPY  04/16/2019    ESOPHAGOGASTRODUODENOSCOPY  03/04/2013    HYSTERECTOMY      LUMBAR SPINE SURGERY  07/24/2019    OPEN REDUCTION AND INTERNAL FIXATION (ORIF) OF INJURY OF SHOULDER Right     ROBOTIC ARTHROPLASTY, KNEE Right 4/17/2023    Procedure: ROBOTIC ARTHROPLASTY, KNEE, TOTAL;  Surgeon: Yung Allen MD;  Location: [facility];  Service: Orthopedics;  Laterality: Right;    Suspension of bladder      x2    TUBAL LIGATION  1975       Review of patient's allergies indicates:   Allergen Reactions    Adhesive Dermatitis    Niacin Shortness Of Breath     Other reaction(s): UNKNOWN  Redness and SOB  Other reaction(s): Not available    Lisinopril Other (See Comments)    Latex Other (See Comments)     Other reaction(s): UNKNOWN    Statins-hmg-coa reductase inhibitors Other (See Comments)       Outpatient Medications Marked as Taking for the 2/8/24 encounter (Office Visit) with Charity Jerome MD   Medication Sig Dispense Refill    blood-glucose meter (TRUE METRIX GLUCOSE METER) Misc TEST ONCE DAILY 1 each 0    clopidogreL (PLAVIX) 75 mg tablet Take 75 mg by mouth once daily.      FLAXSEED OIL-OMEGA 3,6,9 ORAL Take 1 capsule by mouth Daily.      glyBURIDE-metformin (GLUCOVANCE) 2.5-500 mg per tablet TAKE ONE TABLET BY MOUTH TWO TIMES A DAY WITH A MEAL 180 tablet 3    isosorbide mononitrate (IMDUR) 30 MG 24 hr tablet Take 1 tablet (30 mg total) by mouth once daily. 90 tablet 3    losartan (COZAAR) 25 MG tablet Take 25 mg by mouth once daily.      magnesium 30 mg Tab Take 1 tablet  by mouth 2 (two) times a day.      nitroGLYCERIN (NITROSTAT) 0.4 MG SL tablet TAKE 1 TABLET (SUBLINGUAL) ONCE EVERY 5 MIN FOR 3 DOSES FOR CHEST PAIN IF NOT RELIEVED GO TO ER.      pantoprazole (PROTONIX) 40 MG tablet TAKE ONE TABLET BY MOUTH EVERY DAY 30 tablet 6    TRUE METRIX GLUCOSE TEST STRIP Strp CHECK BLOOD GLUCOSE ONCE DAILY 100 strip 5    TRUEPLUS LANCETS 28 gauge Misc CHECK ONCE DAILY 100 each 5    zinc acetate 25 mg (zinc) Cap Take 25 mg by mouth once daily.      [DISCONTINUED] carvediloL (COREG) 6.25 MG tablet Take 1 tablet (6.25 mg total) by mouth 2 (two) times daily with meals. 180 tablet 3    [DISCONTINUED] rOPINIRole (REQUIP) 1 MG tablet Take 1 mg by mouth Daily.         Social History     Socioeconomic History    Marital status:    Tobacco Use    Smoking status: Never     Passive exposure: Never    Smokeless tobacco: Never   Substance and Sexual Activity    Alcohol use: Not Currently    Drug use: Never    Sexual activity: Yes     Partners: Female     Social Determinants of Health     Financial Resource Strain: Low Risk  (10/2/2023)    Overall Financial Resource Strain (CARDIA)     Difficulty of Paying Living Expenses: Not hard at all   Food Insecurity: No Food Insecurity (10/2/2023)    Hunger Vital Sign     Worried About Running Out of Food in the Last Year: Never true     Ran Out of Food in the Last Year: Never true   Transportation Needs: No Transportation Needs (10/2/2023)    PRAPARE - Transportation     Lack of Transportation (Medical): No     Lack of Transportation (Non-Medical): No   Physical Activity: Insufficiently Active (10/2/2023)    Exercise Vital Sign     Days of Exercise per Week: 3 days     Minutes of Exercise per Session: 20 min   Stress: No Stress Concern Present (10/2/2023)    [employer] - Occupational Stress Questionnaire     Feeling of Stress : Not at all   Social Connections: Socially Integrated (10/2/2023)    Social Connection and Isolation  Panel [NHANES]     Frequency of Communication with Friends and Family: More than three times a week     Frequency of Social Gatherings with Friends and Family: More than three times a week     Attends [employer] Services: More than 4 times per year     Active Member of Clubs or Organizations: Yes     Attends Club or Organization Meetings: More than 4 times per year     Marital Status:    Housing Stability: Low Risk  (10/2/2023)    Housing Stability Vital Sign     Unable to Pay for Housing in the Last Year: No     Number of Places Lived in the Last Year: 1     Unstable Housing in the Last Year: No        Family History   Problem Relation Age of Onset    Heart attack Mother     Heart disease Mother     Diabetes Father     Cancer Sister     Idiopathic pulmonary fibrosis Brother     Cancer Brother         I P F        Patient Care Team:  Charity Jerome MD as PCP - General (Internal Medicine)  Ishan Ivan MD as Consulting Physician (Cardiology)  Kalie Kang III, MD as Consulting Physician (Gastroenterology)  Juan Carlos Talamantes MD as Consulting Physician (Obstetrics and Gynecology)  Timi Miller MD as Consulting Physician (Family Medicine)  Florin Cherry MD as Consulting Physician (Ophthalmology)     Opioid Screening: Patient medication list reviewed, patient is not taking prescription opioids. Patient is not using additional opioids than prescribed. Patient is at low risk of substance abuse based on this opioid use history.       Subjective:     ROS    A comprehensive review of systems is obtained and is essentially negative except for that stated in the HPI     Patient Reported Health Risk Assessment  What [age]  Are you male or female?: Female  During the past four weeks, how much have you been bothered by emotional problems such as feeling anxious, depressed, irritable, sad, or downhearted and blue?: Not at all  During the past five weeks, has your physical and/or emotional  health limited your social activities with family, friends, neighbors, or groups?: Not at all  During the past four weeks, how much bodily pain have you generally had?: Moderate pain  During the past four weeks, was someone available to help if you needed and wanted help?: Yes, as much as I wanted  During the past four weeks, what was the hardest physical activity you could do for at least two minutes?: Light  Can you get to places out of walking distance without help?  (For example, can you travel alone on buses or taxis, or drive your own car?): Yes  Can you go shopping for groceries or clothes without someone's help?: Yes  Can you prepare your own meals?: Yes  Can you do your own housework without help?: Yes  Because of any health problems, do you need the help of another person with your personal care needs such as eating, bathing, dressing, or getting around the house?: No  Can you handle your own money without help?: Yes  During the past four weeks, how would you rate your health in general?: Good  How have things been going for you during the past four weeks?: Pretty well  Are you having difficulties driving your car?: No  Do you always fasten your seat belt when you are in a car?: Yes, usually  How often in the past four weeks have you been bothered by falling or dizzy when standing up?: Never  How often in the past four weeks have you been bothered by sexual problems?: Never  How often in the past four weeks have you been bothered by trouble eating well?: Never  How often in the past four weeks have you been bothered by teeth or denture problems?: Never  How often in the past four weeks have you been bothered with problems using the telephone?: Never  How often in the past four weeks have you been bothered by tiredness or fatigue?: Sometimes  Have you fallen two or more times in the past year?: No  Are you afraid of falling?: No  Are you a smoker?: No  During the past four weeks, how many drinks of wine,  "beer, or other alcoholic beverages did you have?: No alcohol at all  Do you exercise for about 20 minutes three or more days a week?: Yes, most of the time  Have you been given any information to help you with hazards in your house that might hurt you?: Yes  Have you been given any information to help you with keeping track of your medications?: Yes  How often do you have trouble taking medicines the way you've been told to take them?: I always take them as prescribed  How confident are you that you can control and manage most of your health problems?: Very confident  What is your race? (Check all that apply.):     Objective:     /88 (BP Location: Left arm, Patient Position: Sitting, BP Method: Large (Manual))   Pulse 93   Ht 5' 1" (1.549 m)   Wt 79.8 kg (176 lb)   SpO2 97%   BMI 33.25 kg/m²     Physical Exam  Constitutional:       Appearance: Normal appearance.   HENT:      Head: Normocephalic and atraumatic.      Nose: Nose normal.      Mouth/Throat:      Mouth: Mucous membranes are moist.      Pharynx: Oropharynx is clear.   Eyes:      Extraocular Movements: Extraocular movements intact.      Pupils: Pupils are equal, round, and reactive to light.   Cardiovascular:      Rate and Rhythm: Normal rate and regular rhythm.      Pulses: Normal pulses.   Pulmonary:      Effort: Pulmonary effort is normal.      Breath sounds: Normal breath sounds.   Abdominal:      General: Bowel sounds are normal.      Palpations: Abdomen is soft.   Musculoskeletal:         General: Normal range of motion.      Cervical back: Normal range of motion and neck supple.   Skin:     General: Skin is warm.   Neurological:      General: No focal deficit present.      Mental Status: She is alert and oriented to person, place, and time. Mental status is at baseline.   Psychiatric:         Mood and Affect: Mood normal.                No data to display                  2/8/2024     2:20 PM 10/2/2023     2:20 PM 9/5/2023     " 3:30 PM 5/4/2023     8:15 AM 2/16/2023     2:15 PM 2/1/2023    11:20 AM 12/27/2022     3:15 PM   Fall Risk Assessment - Outpatient   Mobility Status Ambulatory Ambulatory Ambulatory Ambulatory w/ assistance Ambulatory Ambulatory Ambulatory   Number of falls 0 0 0 0 1 with injury 0 1 with injury   Identified as fall risk False False False True True False True           Depression Screening  Over the past two weeks, has the patient felt down, depressed, or hopeless?: No  Over the past two weeks, has the patient felt little interest or pleasure in doing things?: No  Functional Ability/Safety Screening  Was the patient's timed Up & Go test unsteady or longer than 30 seconds?: No  Does the patient need help with phone, transportation, shopping, preparing meals, housework, laundry, meds, or managing money?: No  Does the patient's home have rugs in the hallway, lack grab bars in the bathroom, lack handrails on the stairs or have poor lighting?: No  Have you noticed any hearing difficulties?: No  Cognitive Function (Assessed through direct observation with due consideration of information obtained by way of patient reports and/or concerns raised by family, friends, caretakers, or others)    Does the patient repeat questions/statements in the same day?: No  Does the patient have trouble remembering the date, year, and time?: No  Does the patient have difficulty managing finances?: No  Does the patient have a decreased sense of direction?: No      Assessment:     Problem List Items Addressed This Visit          Cardiac/Vascular    Hyperlipidemia (Chronic)     -unable to tolerate statin because of statin induced myalgia         Essential (primary) hypertension - Primary (Chronic)     -increasing dose of Coreg 12.5 mg p.o. b.i.d. because of elevated blood pressure readings   -emphasized on importance of continuing her Cozaar 25 mg p.o. daily   -low-sodium diet            Atherosclerotic heart disease of native coronary artery  with other forms of angina pectoris     -medically optimized with aspirin, Plavix, beta-blocker and losartan, advised to continue  -unable to tolerate statins            Renal/    Chronic kidney disease, stage 3a       Endocrine    Type 2 diabetes mellitus without complications (Chronic)     -HGB A1c still elevated above 7   -currently on glyburide metformin combination, continue  -giving a prescription for Mounjaro 2.5 mg subQ weekly, if this goes through, will start scaling back on the metformin glyburide  -patient advised on the importance of avoiding excessive carbohydrates and sugary food intake and having some form of routine aerobic exercise on a regular basis.          Relevant Medications    tirzepatide (MOUNJARO) 2.5 mg/0.5 mL PnIj    Microalbuminuria due to type 2 diabetes mellitus     -on losartan, continue            Other    Medicare annual wellness visit, subsequent     -labs are reviewed all essentially normal  -patient is advised on importance of watching her carbohydrate intake and saturated fat intake, making the right nutritional choices and exercising on a regular basis  -up-to-date with the screening             Plan:     Medicare Annual Wellness and Personalized Prevention Plan:   Fall Risk + Home Safety + Hearing Impairment + Depression Screen + Cognitive Impairment Screen + Health Risk Assessment all reviewed.       Health Maintenance Topics with due status: Not Due       Topic Last Completion Date    DEXA Scan 03/25/2022    Diabetes Urine Screening 02/02/2024    Lipid Panel 02/02/2024    Hemoglobin A1c 02/02/2024    Aspirin/Antiplatelet Therapy 02/08/2024      The patient's Health Maintenance was reviewed and the following appears to be due at this time:   Health Maintenance Due   Topic Date Due    Hepatitis C Screening  Never done    TETANUS VACCINE  Never done    Shingles Vaccine (1 of 2) Never done    RSV Vaccine (Age 60+ and Pregnant patients) (1 - 1-dose 60+ series) Never done     COVID-19 Vaccine (3 - 2023-24 season) 09/01/2023    Eye Exam  12/28/2023         Advance Care Planning     Date: 02/08/2024  Patient did not wish or was not able to name a surrogate decision maker or provide an Advance Care Plan.           Medication List with Changes/Refills   New Medications    TIRZEPATIDE (MOUNJARO) 2.5 MG/0.5 ML PNIJ    Inject 2.5 mg into the skin every 7 days.       Start Date: 2/8/2024  End Date: --   Current Medications    BLOOD-GLUCOSE METER (TRUE METRIX GLUCOSE METER) MISC    TEST ONCE DAILY       Start Date: 8/7/2023  End Date: --    CLOPIDOGREL (PLAVIX) 75 MG TABLET    Take 75 mg by mouth once daily.       Start Date: 6/13/2022 End Date: --    FLAXSEED OIL-OMEGA 3,6,9 ORAL    Take 1 capsule by mouth Daily.       Start Date: --        End Date: --    GLYBURIDE-METFORMIN (GLUCOVANCE) 2.5-500 MG PER TABLET    TAKE ONE TABLET BY MOUTH TWO TIMES A DAY WITH A MEAL       Start Date: 12/6/2023 End Date: --    ISOSORBIDE MONONITRATE (IMDUR) 30 MG 24 HR TABLET    Take 1 tablet (30 mg total) by mouth once daily.       Start Date: 6/1/2023  End Date: 5/31/2024    LOSARTAN (COZAAR) 25 MG TABLET    Take 25 mg by mouth once daily.       Start Date: --        End Date: --    MAGNESIUM 30 MG TAB    Take 1 tablet by mouth 2 (two) times a day.       Start Date: --        End Date: --    NITROGLYCERIN (NITROSTAT) 0.4 MG SL TABLET    TAKE 1 TABLET (SUBLINGUAL) ONCE EVERY 5 MIN FOR 3 DOSES FOR CHEST PAIN IF NOT RELIEVED GO TO ER.       Start Date: 4/5/2022  End Date: --    PANTOPRAZOLE (PROTONIX) 40 MG TABLET    TAKE ONE TABLET BY MOUTH EVERY DAY       Start Date: 1/5/2024  End Date: --    TRUE METRIX GLUCOSE TEST STRIP STRP    CHECK BLOOD GLUCOSE ONCE DAILY       Start Date: 8/7/2023  End Date: --    TRUEPLUS LANCETS 28 GAUGE MISC    CHECK ONCE DAILY       Start Date: 8/7/2023  End Date: --    ZINC ACETATE 25 MG (ZINC) CAP    Take 25 mg by mouth once daily.       Start Date: 4/6/2022  End Date: --   Changed  and/or Refilled Medications    Modified Medication Previous Medication    CARVEDILOL (COREG) 12.5 MG TABLET carvediloL (COREG) 6.25 MG tablet       Take 1 tablet (12.5 mg total) by mouth 2 (two) times daily with meals.    Take 1 tablet (6.25 mg total) by mouth 2 (two) times daily with meals.       Start Date: 2/8/2024  End Date: 2/7/2025    Start Date: 6/1/2023  End Date: 2/8/2024    ROPINIROLE (REQUIP) 1 MG TABLET rOPINIRole (REQUIP) 1 MG tablet       Take 2 tablets (2 mg total) by mouth 3 (three) times daily.    Take 1 mg by mouth Daily.       Start Date: 2/8/2024  End Date: --    Start Date: --        End Date: 2/8/2024   Discontinued Medications    LOSARTAN (COZAAR) 25 MG TABLET    Take 1 tablet (25 mg total) by mouth once daily.       Start Date: 10/2/2023 End Date: 2/8/2024    RED YEAST RICE 600 MG CAP    Take 1 tablet by mouth Daily.       Start Date: --        End Date: 2/8/2024    SEMAGLUTIDE (OZEMPIC) 0.25 MG OR 0.5 MG (2 MG/3 ML) PEN INJECTOR    Inject 0.25 mg into the skin every 7 days.       Start Date: 10/2/2023 End Date: 2/8/2024        Follow up in about 4 months (around 6/8/2024) for DM FU, BP Check. In addition to their scheduled follow up, the patient has also been instructed to follow up on as needed basis.

## 2024-02-08 NOTE — ASSESSMENT & PLAN NOTE
-HGB A1c still elevated above 7   -currently on glyburide metformin combination, continue  -giving a prescription for Mounjaro 2.5 mg subQ weekly, if this goes through, will start scaling back on the metformin glyburide  -patient advised on the importance of avoiding excessive carbohydrates and sugary food intake and having some form of routine aerobic exercise on a regular basis.

## 2024-02-08 NOTE — LETTER
AUTHORIZATION FOR RELEASE OF   CONFIDENTIAL INFORMATION    Dear Dr Cherry    We are seeing Eve Altman, date of birth 1945, in the clinic at [place] [address] [facility]. Charity Jerome MD is the patient's PCP. Eve Altman has an outstanding lab/procedure at the time we reviewed her chart. In order to help keep her health information updated, she has authorized us to request the following medical record(s):        (  )  MAMMOGRAM                                      (  )  COLONOSCOPY      (  )  PAP SMEAR                                          (  )  OUTSIDE LAB RESULTS     (  )  DEXA SCAN                                          ( X )  DIABETIC EYE EXAM            (  )  FOOT EXAM                                          (  )  ENTIRE RECORD     (  )  OUTSIDE IMMUNIZATIONS                 (  )  _______________         Please fax records to [facility], Bhanushali, Reshma A, MD, [phone]              Patient Name: Eve Altman  : 1945  Patient Phone #: [phone]

## 2024-02-12 ENCOUNTER — DOCUMENTATION ONLY (OUTPATIENT)
Dept: INTERNAL MEDICINE | Facility: CLINIC | Age: 79
End: 2024-02-12
Payer: MEDICARE

## 2024-04-09 ENCOUNTER — TELEPHONE (OUTPATIENT)
Dept: INTERNAL MEDICINE | Facility: CLINIC | Age: 79
End: 2024-04-09
Payer: MEDICARE

## 2024-04-09 DIAGNOSIS — R42 DIZZINESS: Primary | ICD-10-CM

## 2024-04-09 NOTE — TELEPHONE ENCOUNTER
----- Message from Candis Mehta sent at 4/9/2024  3:39 PM CDT -----  .Type:  Needs Medical Advice    Who Called: pt daughter   Symptoms (please be specific):    How long has patient had these symptoms:    Pharmacy name and phone #:  [facility] - [place], [place] - [address].    Would the patient rather a call back or a response via [facility]? Call back   Best Call Back Number: [phone]  Additional Information: pt hasn't had her meclizine 25 mg since 2017 and would like a new script for it

## 2024-04-11 RX ORDER — MECLIZINE HYDROCHLORIDE 25 MG/1
25 TABLET ORAL 3 TIMES DAILY PRN
Qty: 30 TABLET | Refills: 1 | Status: SHIPPED | OUTPATIENT
Start: 2024-04-11

## 2024-04-20 ENCOUNTER — PATIENT MESSAGE (OUTPATIENT)
Dept: ADMINISTRATIVE | Facility: OTHER | Age: 79
End: 2024-04-20
Payer: MEDICARE

## 2024-04-30 DIAGNOSIS — E11.9 TYPE 2 DIABETES MELLITUS WITHOUT COMPLICATION, WITHOUT LONG-TERM CURRENT USE OF INSULIN: Chronic | ICD-10-CM

## 2024-04-30 RX ORDER — TIRZEPATIDE 2.5 MG/.5ML
2.5 INJECTION, SOLUTION SUBCUTANEOUS
Qty: 2 ML | Refills: 3 | Status: SHIPPED | OUTPATIENT
Start: 2024-04-30 | End: 2024-06-11

## 2024-05-13 PROBLEM — Z00.00 MEDICARE ANNUAL WELLNESS VISIT, SUBSEQUENT: Status: RESOLVED | Noted: 2024-02-08 | Resolved: 2024-05-13

## 2024-05-30 DIAGNOSIS — E11.9 TYPE 2 DIABETES MELLITUS WITHOUT COMPLICATION, WITHOUT LONG-TERM CURRENT USE OF INSULIN: Primary | ICD-10-CM

## 2024-06-04 ENCOUNTER — OFFICE VISIT (OUTPATIENT)
Dept: ORTHOPEDICS | Facility: CLINIC | Age: 79
End: 2024-06-04
Payer: MEDICARE

## 2024-06-04 ENCOUNTER — HOSPITAL ENCOUNTER (OUTPATIENT)
Dept: RADIOLOGY | Facility: CLINIC | Age: 79
Discharge: HOME | End: 2024-06-04
Attending: ORTHOPAEDIC SURGERY
Payer: MEDICARE

## 2024-06-04 VITALS
WEIGHT: 171 LBS | SYSTOLIC BLOOD PRESSURE: 126 MMHG | HEART RATE: 93 BPM | HEIGHT: 61 IN | BODY MASS INDEX: 32.28 KG/M2 | DIASTOLIC BLOOD PRESSURE: 73 MMHG

## 2024-06-04 DIAGNOSIS — Z96.651 AFTERCARE FOLLOWING RIGHT KNEE JOINT REPLACEMENT SURGERY: Primary | ICD-10-CM

## 2024-06-04 DIAGNOSIS — Z47.1 AFTERCARE FOLLOWING RIGHT KNEE JOINT REPLACEMENT SURGERY: ICD-10-CM

## 2024-06-04 DIAGNOSIS — Z96.651 AFTERCARE FOLLOWING RIGHT KNEE JOINT REPLACEMENT SURGERY: ICD-10-CM

## 2024-06-04 DIAGNOSIS — Z47.1 AFTERCARE FOLLOWING RIGHT KNEE JOINT REPLACEMENT SURGERY: Primary | ICD-10-CM

## 2024-06-04 PROCEDURE — 73562 X-RAY EXAM OF KNEE 3: CPT | Mod: RT,,, | Performed by: ORTHOPAEDIC SURGERY

## 2024-06-04 PROCEDURE — 3288F FALL RISK ASSESSMENT DOCD: CPT | Mod: CPTII,,, | Performed by: ORTHOPAEDIC SURGERY

## 2024-06-04 PROCEDURE — 99213 OFFICE O/P EST LOW 20 MIN: CPT | Mod: ,,, | Performed by: ORTHOPAEDIC SURGERY

## 2024-06-04 PROCEDURE — 1159F MED LIST DOCD IN RCRD: CPT | Mod: CPTII,,, | Performed by: ORTHOPAEDIC SURGERY

## 2024-06-04 PROCEDURE — 3074F SYST BP LT 130 MM HG: CPT | Mod: CPTII,,, | Performed by: ORTHOPAEDIC SURGERY

## 2024-06-04 PROCEDURE — 3078F DIAST BP <80 MM HG: CPT | Mod: CPTII,,, | Performed by: ORTHOPAEDIC SURGERY

## 2024-06-04 PROCEDURE — 1101F PT FALLS ASSESS-DOCD LE1/YR: CPT | Mod: CPTII,,, | Performed by: ORTHOPAEDIC SURGERY

## 2024-06-04 NOTE — PROGRESS NOTES
Chief Complaint:   Chief Complaint   Patient presents with    Right Knee - Follow-up     2 month F/U RIGHT TKA ON 4/17/23, no complaints at this time, one spot that is sensitive to touch on the lateral aspect of the knee intermittently, feels like a little fit of fluid        History of present illness:  79-year-old status post right total knee arthroplasty.  Overall she is doing well.  No complaints of pain.  She is year out.  Happy with her recovery.  Patient has no issues.    History of Present Illness  The patient presents for evaluation of multiple medical concerns.    Past Medical History:   Diagnosis Date    Chronic GERD     Coronary artery disease     Essential (primary) hypertension     H/O heart artery stent     HLD (hyperlipidemia)     Hypothyroidism, unspecified     IC (interstitial cystitis)     Osteoarthritis of right knee     Osteopenia     Stress incontinence (female) (male)     Type 2 diabetes mellitus without complications        Past Surgical History:   Procedure Laterality Date    Bone and/or joint imaging; whole body   06/24/2019    BUNIONECTOMY      CATARACT EXTRACTION W/  INTRAOCULAR LENS IMPLANT Bilateral     CHOLECYSTECTOMY      COLONOSCOPY  03/11/2013    CORONARY ANGIOPLASTY WITH STENT PLACEMENT      x 3    ESOPHAGEAL DILATION      ESOPHAGOGASTRODUODENOSCOPY  04/16/2019    ESOPHAGOGASTRODUODENOSCOPY  03/04/2013    EYE SURGERY      HYSTERECTOMY      LUMBAR SPINE SURGERY  07/24/2019    OPEN REDUCTION AND INTERNAL FIXATION (ORIF) OF INJURY OF SHOULDER Right     ROBOTIC ARTHROPLASTY, KNEE Right 04/17/2023    Procedure: ROBOTIC ARTHROPLASTY, KNEE, TOTAL;  Surgeon: Yung Allen MD;  Location: [facility];  Service: Orthopedics;  Laterality: Right;    SPINE SURGERY  July 2019    Lower back    Suspension of bladder      x2    TUBAL LIGATION  1975       Current Outpatient Medications   Medication Sig    blood-glucose meter (TRUE METRIX GLUCOSE METER) Misc TEST ONCE DAILY    carvediloL (COREG) 12.5  MG tablet Take 1 tablet (12.5 mg total) by mouth 2 (two) times daily with meals.    clopidogreL (PLAVIX) 75 mg tablet Take 75 mg by mouth once daily.    FLAXSEED OIL-OMEGA 3,6,9 ORAL Take 1 capsule by mouth Daily.    glyBURIDE-metformin (GLUCOVANCE) 2.5-500 mg per tablet TAKE ONE TABLET BY MOUTH TWO TIMES A DAY WITH A MEAL    isosorbide mononitrate (IMDUR) 30 MG 24 hr tablet Take 1 tablet (30 mg total) by mouth once daily.    losartan (COZAAR) 25 MG tablet Take 25 mg by mouth once daily.    meclizine (ANTIVERT) 25 mg tablet Take 1 tablet (25 mg total) by mouth 3 (three) times daily as needed for Dizziness.    MOUNJARO 2.5 mg/0.5 mL PnIj INJECT 2.5 MG INTO THE SKIN EVERY 7 DAYS.    nitroGLYCERIN (NITROSTAT) 0.4 MG SL tablet TAKE 1 TABLET (SUBLINGUAL) ONCE EVERY 5 MIN FOR 3 DOSES FOR CHEST PAIN IF NOT RELIEVED GO TO ER.    pantoprazole (PROTONIX) 40 MG tablet TAKE ONE TABLET BY MOUTH EVERY DAY    rOPINIRole (REQUIP) 1 MG tablet Take 2 tablets (2 mg total) by mouth 3 (three) times daily.    TRUE METRIX GLUCOSE TEST STRIP Strp CHECK BLOOD GLUCOSE ONCE DAILY    TRUEPLUS LANCETS 28 gauge Misc CHECK ONCE DAILY    zinc acetate 25 mg (zinc) Cap Take 25 mg by mouth once daily.    magnesium 30 mg Tab Take 1 tablet by mouth 2 (two) times a day.     No current facility-administered medications for this visit.       Review of patient's allergies indicates:   Allergen Reactions    Adhesive Dermatitis    Niacin Shortness Of Breath     Other reaction(s): UNKNOWN  Redness and SOB  Other reaction(s): Not available    Lisinopril Other (See Comments)    Latex Other (See Comments)     Other reaction(s): UNKNOWN    Statins-hmg-coa reductase inhibitors Other (See Comments)       Family History   Problem Relation Name Age of Onset    Heart attack Mother Kortney     Heart disease Mother Kortney     Diabetes Father Juan Carlos     Cancer Sister Mari     Idiopathic pulmonary fibrosis Brother      Cancer Brother Juan Carlos jr. SOLE PRADHAN F         History     Socioeconomic History    Marital status:    Tobacco Use    Smoking status: Never     Passive exposure: Never    Smokeless tobacco: Never   Substance and Sexual Activity    Alcohol use: Not Currently    Drug use: Never    Sexual activity: Yes     Partners: Female     Social Determinants of Health     Financial Resource Strain: Low Risk  (10/2/2023)    Overall Financial Resource Strain (CARDIA)     Difficulty of Paying Living Expenses: Not hard at all   Food Insecurity: No Food Insecurity (10/2/2023)    Hunger Vital Sign     Worried About Running Out of Food in the Last Year: Never true     Ran Out of Food in the Last Year: Never true   Transportation Needs: No Transportation Needs (10/2/2023)    PRAPARE - Transportation     Lack of Transportation (Medical): No     Lack of Transportation (Non-Medical): No   Physical Activity: Insufficiently Active (10/2/2023)    Exercise Vital Sign     Days of Exercise per Week: 3 days     Minutes of Exercise per Session: 20 min   Stress: No Stress Concern Present (10/2/2023)    [employer] [place] [employer] - Occupational Stress Questionnaire     Feeling of Stress : Not at all   Housing Stability: Low Risk  (10/2/2023)    Housing Stability Vital Sign     Unable to Pay for Housing in the Last Year: No     Number of Places Lived in the Last Year: 1     Unstable Housing in the Last Year: No           Review of Systems:    Constitution: Negative for chills, fever, and sweats.  Negative for unexplained weight loss.    HENT:  Negative for headaches and blurry vision.    Cardiovascular:Negative for chest pain or irregular heart beat. Negative for hypertension.    Respiratory:  Negative for cough and shortness of breath.    Gastrointestinal: Negative for abdominal pain, heartburn, melena, nausea, and vomitting.    Genitourinary:  Negative bladder incontinence and dysuria.    Musculoskeletal:  See HPI    Neurological: Negative for  numbness.    Psychiatric/Behavioral: Negative for depression.  The patient is not nervous/anxious.      Endocrine: Negative for polyuria    Hematologic/Lymphatic: Negative for bleeding problem.  Does not bruise/bleed easily.    Skin: Negative for poor would healing and rash      Physical Examination:    Vital Signs:    Vitals:    06/04/24 0917   BP: 126/73   Pulse: 93       Body mass index is 32.31 kg/m².    General: No acute distress, alert and oriented, healthy appearing    HEENT: Head is atraumatic, mucous membranes are moist    Neck: Supples, no JVD    Cardiovascular: Palpable dorsalis pedis and posterior tibial pulses, regular rate and rhythm to those pulses    Lungs: Breathing non-labored    Skin: no rashes appreciated    Neurologic: Can flex and extend knees, ankles, and toes. Sensation is grossly intact    Right knee:  No significant crepitus range of motion.  Brisk cap refill disappeared sensation intact distally.  Range of motion is from 0 to 125.  Stable to varus and valgus.  Stable anterior-posterior drawer    X-rays:  Three views right knee reviewed patient's implants appear well fixed.  No signs of loosening or subsidence noted     Assessment::  Post right total knee arthroplasty    Plan:  Overall patient is doing quite well.  Pain is well-controlled.  It was happy with her recovery.  We will plan to see her back on an as-needed basis.  We did discuss current antibiotic prophylaxis and she is aware of this.    This note was generated with the assistance of ambient listening technology. Verbal consent was obtained by the patient and accompanying visitor(s) for the recording of patient appointment to facilitate this note. I attest to having reviewed and edited the generated note for accuracy, though some syntax or spelling errors may persist. Please contact the author of this note for any clarification.      This note was created using mangofizz jobs voice recognition software that occasionally misinterpreted  phrases or words.    Consult note is delivered via Epic messaging service.

## 2024-06-10 ENCOUNTER — LAB VISIT (OUTPATIENT)
Dept: LAB | Facility: HOSPITAL | Age: 79
End: 2024-06-10
Attending: INTERNAL MEDICINE
Payer: MEDICARE

## 2024-06-10 DIAGNOSIS — E11.9 TYPE 2 DIABETES MELLITUS WITHOUT COMPLICATION, WITHOUT LONG-TERM CURRENT USE OF INSULIN: ICD-10-CM

## 2024-06-10 LAB
EST. AVERAGE GLUCOSE BLD GHB EST-MCNC: 128.4 MG/DL
HBA1C MFR BLD: 6.1 %

## 2024-06-10 PROCEDURE — 36415 COLL VENOUS BLD VENIPUNCTURE: CPT

## 2024-06-10 PROCEDURE — 83036 HEMOGLOBIN GLYCOSYLATED A1C: CPT

## 2024-06-11 ENCOUNTER — OFFICE VISIT (OUTPATIENT)
Dept: INTERNAL MEDICINE | Facility: CLINIC | Age: 79
End: 2024-06-11
Payer: MEDICARE

## 2024-06-11 VITALS
OXYGEN SATURATION: 93 % | SYSTOLIC BLOOD PRESSURE: 128 MMHG | HEART RATE: 65 BPM | DIASTOLIC BLOOD PRESSURE: 64 MMHG | WEIGHT: 174 LBS | BODY MASS INDEX: 32.85 KG/M2 | HEIGHT: 61 IN

## 2024-06-11 DIAGNOSIS — E78.2 MIXED HYPERLIPIDEMIA: Chronic | ICD-10-CM

## 2024-06-11 DIAGNOSIS — I10 ESSENTIAL (PRIMARY) HYPERTENSION: Chronic | ICD-10-CM

## 2024-06-11 DIAGNOSIS — E11.9 TYPE 2 DIABETES MELLITUS WITHOUT COMPLICATION, WITHOUT LONG-TERM CURRENT USE OF INSULIN: Primary | Chronic | ICD-10-CM

## 2024-06-11 DIAGNOSIS — E11.9 TYPE 2 DIABETES MELLITUS WITHOUT COMPLICATION, WITHOUT LONG-TERM CURRENT USE OF INSULIN: Chronic | ICD-10-CM

## 2024-06-11 PROCEDURE — 1101F PT FALLS ASSESS-DOCD LE1/YR: CPT | Mod: CPTII,,, | Performed by: INTERNAL MEDICINE

## 2024-06-11 PROCEDURE — 3078F DIAST BP <80 MM HG: CPT | Mod: CPTII,,, | Performed by: INTERNAL MEDICINE

## 2024-06-11 PROCEDURE — 1126F AMNT PAIN NOTED NONE PRSNT: CPT | Mod: CPTII,,, | Performed by: INTERNAL MEDICINE

## 2024-06-11 PROCEDURE — 1160F RVW MEDS BY RX/DR IN RCRD: CPT | Mod: CPTII,,, | Performed by: INTERNAL MEDICINE

## 2024-06-11 PROCEDURE — 1159F MED LIST DOCD IN RCRD: CPT | Mod: CPTII,,, | Performed by: INTERNAL MEDICINE

## 2024-06-11 PROCEDURE — 99214 OFFICE O/P EST MOD 30 MIN: CPT | Mod: ,,, | Performed by: INTERNAL MEDICINE

## 2024-06-11 PROCEDURE — 3074F SYST BP LT 130 MM HG: CPT | Mod: CPTII,,, | Performed by: INTERNAL MEDICINE

## 2024-06-11 PROCEDURE — 3288F FALL RISK ASSESSMENT DOCD: CPT | Mod: CPTII,,, | Performed by: INTERNAL MEDICINE

## 2024-06-11 RX ORDER — LANCETS
EACH MISCELLANEOUS
Qty: 100 EACH | Refills: 6 | Status: SHIPPED | OUTPATIENT
Start: 2024-06-11

## 2024-06-11 RX ORDER — TIRZEPATIDE 5 MG/.5ML
5 INJECTION, SOLUTION SUBCUTANEOUS
Qty: 4 PEN | Refills: 4 | Status: SHIPPED | OUTPATIENT
Start: 2024-06-11 | End: 2024-06-11 | Stop reason: SDUPTHER

## 2024-06-11 NOTE — ASSESSMENT & PLAN NOTE
-HGB A1c is now down to 6.1, continue Mounjaro, going up on the dose to 5 mg subQ weekly   -decreasing the dose of metformin glyburide to once a day dosing and hopefully eventually take her off of it depending on how she does  -emphasized on small frequent meals

## 2024-06-11 NOTE — PROGRESS NOTES
Subjective:      Patient ID: Eve Altman is a 79 y.o. female.    Chief Complaint: Follow-up (4 month Diabetes/)    Ms. Prado is a 78-year-old female who is here today for follow-up visit..  Her medical comorbidities are significant for hypertension, hyperlipidemia, CAD status post PTCA to LAD and obtuse marginal, diabetes mellitus type 2, interstitial cystitis.    Currently doing well with no acute needs or complaints .  Happy today since has been has been  doing quite well after completion of treatment of the XRT.  HGB A1c significantly improved to 6.1.  We discussed going up on the dose of the Mounjaro to 5 mg subQ weekly.  The same time I will go ahead and decrease the dose of the glyburide metformin to once a day dosing.  Hopefully in the future I will be able to take her off of the medication.        Social history:  Negative   Family history:  Sister with esophageal cancer questionable, brother with pulmonary fibrosis   Surgical history: Noted in detail     Cardiology: Dr Ivan  GI: Dr. Kang     DEXA:  2022, request records  [id number]  CRS:  2013  Pn: Up-to-date  Diabetic eye exam: Dr. Cherry    The patient's Health Maintenance was reviewed and the following appears to be due at this time:   Health Maintenance Due   Topic Date Due    TETANUS VACCINE  Never done    Shingles Vaccine (1 of 2) Never done    RSV Vaccine (Age 60+ and Pregnant patients) (1 - 1-dose 60+ series) Never done    COVID-19 Vaccine (3 - 2023-24 season) 2023        Past Medical History:  Past Medical History:   Diagnosis Date    Chronic GERD     Coronary artery disease     Essential (primary) hypertension     H/O heart artery stent     HLD (hyperlipidemia)     Hypothyroidism, unspecified     IC (interstitial cystitis)     Osteoarthritis of right knee     Osteopenia     Stress incontinence (female) (male)     Type 2 diabetes mellitus without complications      Past Surgical History:   Procedure Laterality Date    Bone  and/or joint imaging; whole body   06/24/2019    BUNIONECTOMY      CATARACT EXTRACTION W/  INTRAOCULAR LENS IMPLANT Bilateral     CHOLECYSTECTOMY      COLONOSCOPY  03/11/2013    CORONARY ANGIOPLASTY WITH STENT PLACEMENT      x 3    ESOPHAGEAL DILATION      ESOPHAGOGASTRODUODENOSCOPY  04/16/2019    ESOPHAGOGASTRODUODENOSCOPY  03/04/2013    EYE SURGERY      HYSTERECTOMY      LUMBAR SPINE SURGERY  07/24/2019    OPEN REDUCTION AND INTERNAL FIXATION (ORIF) OF INJURY OF SHOULDER Right     ROBOTIC ARTHROPLASTY, KNEE Right 04/17/2023    Procedure: ROBOTIC ARTHROPLASTY, KNEE, TOTAL;  Surgeon: Yung Allen MD;  Location: [facility];  Service: Orthopedics;  Laterality: Right;    SPINE SURGERY  July 2019    Lower back    Suspension of bladder      x2    TUBAL LIGATION  1975     Review of patient's allergies indicates:   Allergen Reactions    Adhesive Dermatitis    Niacin Shortness Of Breath     Other reaction(s): UNKNOWN  Redness and SOB  Other reaction(s): Not available    Lisinopril Other (See Comments)    Latex Other (See Comments)     Other reaction(s): UNKNOWN    Statins-hmg-coa reductase inhibitors Other (See Comments)     Social History     Socioeconomic History    Marital status:    Tobacco Use    Smoking status: Never     Passive exposure: Never    Smokeless tobacco: Never   Substance and Sexual Activity    Alcohol use: Not Currently    Drug use: Never    Sexual activity: Yes     Partners: Female     Social Determinants of Health     Financial Resource Strain: Low Risk  (10/2/2023)    Overall Financial Resource Strain (CARDIA)     Difficulty of Paying Living Expenses: Not hard at all   Food Insecurity: No Food Insecurity (10/2/2023)    Hunger Vital Sign     Worried About Running Out of Food in the Last Year: Never true     Ran Out of Food in the Last Year: Never true   Transportation Needs: No Transportation Needs (10/2/2023)    PRAPARE - Transportation     Lack of Transportation (Medical): No     Lack of  "Transportation (Non-Medical): No   Physical Activity: Insufficiently Active (10/2/2023)    Exercise Vital Sign     Days of Exercise per Week: 3 days     Minutes of Exercise per Session: 20 min   Stress: No Stress Concern Present (10/2/2023)    [employer] [place] [place] [employer] - Occupational Stress Questionnaire     Feeling of Stress : Not at all   Housing Stability: Low Risk  (10/2/2023)    Housing Stability Vital Sign     Unable to Pay for Housing in the Last Year: No     Number of Places Lived in the Last Year: 1     Unstable Housing in the Last Year: No     Family History   Problem Relation Name Age of Onset    Heart attack Mother Kortney     Heart disease Mother Kortney     Diabetes Father Juan Carlos     Cancer Sister Mari     Idiopathic pulmonary fibrosis Brother      Cancer Brother Juan Carlos jr.         I P F       Review of Systems    A comprehensive review of systems was performed and is negative except for that stated above  Objective:   /64 (BP Location: Left arm, Patient Position: Sitting, BP Method: Large (Manual))   Pulse 65   Ht 5' 1" (1.549 m)   Wt 78.9 kg (174 lb)   SpO2 (!) 93%   BMI 32.88 kg/m²     Physical Exam  Constitutional:       Appearance: Normal appearance.   HENT:      Head: Normocephalic and atraumatic.      Nose: Nose normal.      Mouth/Throat:      Mouth: Mucous membranes are moist.      Pharynx: Oropharynx is clear.   Eyes:      Extraocular Movements: Extraocular movements intact.      Pupils: Pupils are equal, round, and reactive to light.   Cardiovascular:      Rate and Rhythm: Normal rate and regular rhythm.      Pulses: Normal pulses.   Pulmonary:      Effort: Pulmonary effort is normal.      Breath sounds: Normal breath sounds.   Abdominal:      General: Bowel sounds are normal.      Palpations: Abdomen is soft.   Musculoskeletal:         General: Normal range of motion.      Cervical back: Normal range of motion and neck supple.   Skin:     General: Skin is warm. "   Neurological:      General: No focal deficit present.      Mental Status: She is alert and oriented to person, place, and time. Mental status is at baseline.   Psychiatric:         Mood and Affect: Mood normal.       Assessment/ Plan:   1. Type 2 diabetes mellitus without complication, without long-term current use of insulin  Assessment & Plan:  -HGB A1c is now down to 6.1, continue Mounjaro, going up on the dose to 5 mg subQ weekly   -decreasing the dose of metformin glyburide to once a day dosing and hopefully eventually take her off of it depending on how she does  -emphasized on small frequent meals      2. Essential (primary) hypertension  Assessment & Plan:  -increasing dose of Coreg 12.5 mg p.o. b.i.d. because of elevated blood pressure readings   -emphasized on importance of continuing her Cozaar 25 mg p.o. daily   -low-sodium diet          3. Mixed hyperlipidemia  Assessment & Plan:  -unable to tolerate statin because of statin induced myalgia      Other orders  -     tirzepatide (MOUNJARO) 5 mg/0.5 mL PnIj; Inject 5 mg into the skin every 7 days.  Dispense: 4 Pen; Refill: 4

## 2024-06-12 RX ORDER — TIRZEPATIDE 5 MG/.5ML
5 INJECTION, SOLUTION SUBCUTANEOUS
Qty: 4 PEN | Refills: 4 | Status: SHIPPED | OUTPATIENT
Start: 2024-06-12

## 2024-06-13 ENCOUNTER — TELEPHONE (OUTPATIENT)
Dept: INTERNAL MEDICINE | Facility: CLINIC | Age: 79
End: 2024-06-13
Payer: MEDICARE

## 2024-06-13 NOTE — TELEPHONE ENCOUNTER
----- Message from Helga Arias sent at 6/13/2024 10:39 AM CDT -----  Regarding: med advice  Who Called: Eve Altman    Caller is requesting assistance/information from provider's office.    Symptoms (please be specific):    How long has patient had these symptoms:    List of preferred pharmacies on file (remove unneeded): [unfilled]  If different, enter pharmacy into here including location and phone number: [email or web address] - [address]   Phone: [phone]  Fax: [phone]        Preferred Method of Contact: Phone Call  Patient's Preferred Phone Number on File: [phone]   Best Call Back Number, if different:  Additional Information: pt is requesting a call back in regards to Prevagen, says at last appt Dr. Jerome stated she would send in a Rx for it

## 2024-06-14 RX ORDER — MEMANTINE HYDROCHLORIDE 5 MG/1
5 TABLET ORAL 2 TIMES DAILY
Qty: 60 TABLET | Refills: 11 | Status: SHIPPED | OUTPATIENT
Start: 2024-06-14 | End: 2025-06-14

## 2024-06-14 NOTE — TELEPHONE ENCOUNTER
Medications Ordered This Encounter   Medications    memantine (NAMENDA) 5 MG Tab     Sig: Take 1 tablet (5 mg total) by mouth 2 (two) times daily.     Dispense:  60 tablet     Refill:  11      Please notify patient, prescription sent, thank you

## 2024-07-02 DIAGNOSIS — K21.9 CHRONIC GERD: ICD-10-CM

## 2024-07-02 RX ORDER — PANTOPRAZOLE SODIUM 40 MG/1
40 TABLET, DELAYED RELEASE ORAL
Qty: 30 TABLET | Refills: 6 | Status: SHIPPED | OUTPATIENT
Start: 2024-07-02

## 2024-07-17 ENCOUNTER — TELEPHONE (OUTPATIENT)
Dept: INTERNAL MEDICINE | Facility: CLINIC | Age: 79
End: 2024-07-17
Payer: MEDICARE

## 2024-07-17 DIAGNOSIS — E11.9 TYPE 2 DIABETES MELLITUS WITHOUT COMPLICATION, WITHOUT LONG-TERM CURRENT USE OF INSULIN: Chronic | ICD-10-CM

## 2024-07-17 NOTE — TELEPHONE ENCOUNTER
----- Message from Pati Jean sent at 7/17/2024 12:04 PM CDT -----  Regarding: Med Advice  .Who Called: Eve Altman    Caller is requesting assistance/information from provider's office.    Symptoms (please be specific): DM reading   How long has patient had these symptoms:    List of preferred pharmacies on file (remove unneeded): [unfilled]  If different, enter pharmacy into here including location and phone number:       Preferred Method of Contact: Phone Call  Patient's Preferred Phone Number on File: [phone]   Best Call Back Number, if different:  Additional Information: pt is wanting to know should she continue taking the metformin due the Mounjaro is maintaining her blood sugar level, please advise

## 2024-07-17 NOTE — TELEPHONE ENCOUNTER
Spoke with Dr. Jerome. Let her know she can stop taking the metformin and continue the mounjaro. Patient notified

## 2024-07-17 NOTE — TELEPHONE ENCOUNTER
----- Message from Pati Jean sent at 7/17/2024 12:01 PM CDT -----  Regarding: Refill Request  .Who Called: Eve Altman    Refill or New Rx:Refill  RX Name and Strength:blood sugar diagnostic Strp  How is the patient currently taking it? (ex. 1XDay):2x  Is this a 30 day or 90 day RX:200  Local or Mail Order:local   List of preferred pharmacies on file (remove unneeded): [unfilled]  If different Pharmacy is requested, enter Pharmacy information here including location and phone number: [facility] - [address]   Ordering Provider:BILLY Jerome      Preferred Method of Contact: Phone Call  Patient's Preferred Phone Number on File: [phone]   Best Call Back Number, if different:  Additional Information: refill request,patient is requesting the prescription to say twice daily instead of once daily due to her taking the Mounjaro shot

## 2024-09-17 DIAGNOSIS — E11.9 TYPE 2 DIABETES MELLITUS WITHOUT COMPLICATION, WITHOUT LONG-TERM CURRENT USE OF INSULIN: Primary | ICD-10-CM

## 2024-09-17 DIAGNOSIS — I10 ESSENTIAL (PRIMARY) HYPERTENSION: ICD-10-CM

## 2024-09-18 ENCOUNTER — TELEPHONE (OUTPATIENT)
Dept: INTERNAL MEDICINE | Facility: CLINIC | Age: 79
End: 2024-09-18
Payer: MEDICARE

## 2024-09-23 ENCOUNTER — LAB VISIT (OUTPATIENT)
Dept: LAB | Facility: HOSPITAL | Age: 79
End: 2024-09-23
Attending: INTERNAL MEDICINE
Payer: MEDICARE

## 2024-09-23 DIAGNOSIS — E11.9 TYPE 2 DIABETES MELLITUS WITHOUT COMPLICATION, WITHOUT LONG-TERM CURRENT USE OF INSULIN: ICD-10-CM

## 2024-09-23 DIAGNOSIS — I10 ESSENTIAL (PRIMARY) HYPERTENSION: ICD-10-CM

## 2024-09-23 LAB
ALBUMIN SERPL-MCNC: 3.7 G/DL (ref 3.4–4.8)
ALBUMIN/GLOB SERPL: 1.3 RATIO (ref 1.1–2)
ALP SERPL-CCNC: 80 UNIT/L (ref 40–150)
ALT SERPL-CCNC: 9 UNIT/L (ref 0–55)
ANION GAP SERPL CALC-SCNC: 8 MEQ/L
AST SERPL-CCNC: 9 UNIT/L (ref 5–34)
BASOPHILS # BLD AUTO: 0.02 X10(3)/MCL
BASOPHILS NFR BLD AUTO: 0.2 %
BILIRUB SERPL-MCNC: 0.4 MG/DL
BUN SERPL-MCNC: 10.4 MG/DL (ref 9.8–20.1)
CALCIUM SERPL-MCNC: 9.4 MG/DL (ref 8.4–10.2)
CHLORIDE SERPL-SCNC: 106 MMOL/L (ref 98–107)
CO2 SERPL-SCNC: 26 MMOL/L (ref 23–31)
CREAT SERPL-MCNC: 1.05 MG/DL (ref 0.55–1.02)
CREAT/UREA NIT SERPL: 10
EOSINOPHIL # BLD AUTO: 0.07 X10(3)/MCL (ref 0–0.9)
EOSINOPHIL NFR BLD AUTO: 0.8 %
ERYTHROCYTE [DISTWIDTH] IN BLOOD BY AUTOMATED COUNT: 17.5 % (ref 11.5–17)
EST. AVERAGE GLUCOSE BLD GHB EST-MCNC: 125.5 MG/DL
GFR SERPLBLD CREATININE-BSD FMLA CKD-EPI: 54 ML/MIN/1.73/M2
GLOBULIN SER-MCNC: 2.8 GM/DL (ref 2.4–3.5)
GLUCOSE SERPL-MCNC: 74 MG/DL (ref 82–115)
HBA1C MFR BLD: 6 %
HCT VFR BLD AUTO: 35.6 % (ref 37–47)
HGB BLD-MCNC: 10.6 G/DL (ref 12–16)
IMM GRANULOCYTES # BLD AUTO: 0.02 X10(3)/MCL (ref 0–0.04)
IMM GRANULOCYTES NFR BLD AUTO: 0.2 %
LYMPHOCYTES # BLD AUTO: 1.52 X10(3)/MCL (ref 0.6–4.6)
LYMPHOCYTES NFR BLD AUTO: 17.2 %
MCH RBC QN AUTO: 22.1 PG (ref 27–31)
MCHC RBC AUTO-ENTMCNC: 29.8 G/DL (ref 33–36)
MCV RBC AUTO: 74.2 FL (ref 80–94)
MONOCYTES # BLD AUTO: 0.55 X10(3)/MCL (ref 0.1–1.3)
MONOCYTES NFR BLD AUTO: 6.2 %
NEUTROPHILS # BLD AUTO: 6.65 X10(3)/MCL (ref 2.1–9.2)
NEUTROPHILS NFR BLD AUTO: 75.4 %
NRBC BLD AUTO-RTO: 0 %
PLATELET # BLD AUTO: 270 X10(3)/MCL (ref 130–400)
PMV BLD AUTO: 9.7 FL (ref 7.4–10.4)
POTASSIUM SERPL-SCNC: 3.8 MMOL/L (ref 3.5–5.1)
PROT SERPL-MCNC: 6.5 GM/DL (ref 5.8–7.6)
RBC # BLD AUTO: 4.8 X10(6)/MCL (ref 4.2–5.4)
SODIUM SERPL-SCNC: 140 MMOL/L (ref 136–145)
WBC # BLD AUTO: 8.83 X10(3)/MCL (ref 4.5–11.5)

## 2024-09-23 PROCEDURE — 83036 HEMOGLOBIN GLYCOSYLATED A1C: CPT

## 2024-09-23 PROCEDURE — 85025 COMPLETE CBC W/AUTO DIFF WBC: CPT

## 2024-09-23 PROCEDURE — 36415 COLL VENOUS BLD VENIPUNCTURE: CPT

## 2024-09-23 PROCEDURE — 80053 COMPREHEN METABOLIC PANEL: CPT

## 2024-09-25 ENCOUNTER — OFFICE VISIT (OUTPATIENT)
Dept: INTERNAL MEDICINE | Facility: CLINIC | Age: 79
End: 2024-09-25
Payer: MEDICARE

## 2024-09-25 VITALS
HEIGHT: 61 IN | DIASTOLIC BLOOD PRESSURE: 68 MMHG | BODY MASS INDEX: 32.28 KG/M2 | HEART RATE: 90 BPM | SYSTOLIC BLOOD PRESSURE: 130 MMHG | OXYGEN SATURATION: 95 % | WEIGHT: 171 LBS

## 2024-09-25 DIAGNOSIS — I10 ESSENTIAL (PRIMARY) HYPERTENSION: Chronic | ICD-10-CM

## 2024-09-25 DIAGNOSIS — E78.2 MIXED HYPERLIPIDEMIA: Chronic | ICD-10-CM

## 2024-09-25 DIAGNOSIS — Z23 NEED FOR VACCINATION: Primary | ICD-10-CM

## 2024-09-25 DIAGNOSIS — E11.9 TYPE 2 DIABETES MELLITUS WITHOUT COMPLICATION, WITHOUT LONG-TERM CURRENT USE OF INSULIN: Chronic | ICD-10-CM

## 2024-09-25 PROCEDURE — 1126F AMNT PAIN NOTED NONE PRSNT: CPT | Mod: CPTII,,, | Performed by: INTERNAL MEDICINE

## 2024-09-25 PROCEDURE — G0008 ADMIN INFLUENZA VIRUS VAC: HCPCS | Mod: ,,, | Performed by: INTERNAL MEDICINE

## 2024-09-25 PROCEDURE — 1101F PT FALLS ASSESS-DOCD LE1/YR: CPT | Mod: CPTII,,, | Performed by: INTERNAL MEDICINE

## 2024-09-25 PROCEDURE — 90653 IIV ADJUVANT VACCINE IM: CPT | Mod: ,,, | Performed by: INTERNAL MEDICINE

## 2024-09-25 PROCEDURE — 99214 OFFICE O/P EST MOD 30 MIN: CPT | Mod: ,,, | Performed by: INTERNAL MEDICINE

## 2024-09-25 PROCEDURE — 3288F FALL RISK ASSESSMENT DOCD: CPT | Mod: CPTII,,, | Performed by: INTERNAL MEDICINE

## 2024-09-25 PROCEDURE — 1159F MED LIST DOCD IN RCRD: CPT | Mod: CPTII,,, | Performed by: INTERNAL MEDICINE

## 2024-09-25 PROCEDURE — 1160F RVW MEDS BY RX/DR IN RCRD: CPT | Mod: CPTII,,, | Performed by: INTERNAL MEDICINE

## 2024-09-25 PROCEDURE — 3078F DIAST BP <80 MM HG: CPT | Mod: CPTII,,, | Performed by: INTERNAL MEDICINE

## 2024-09-25 PROCEDURE — 3075F SYST BP GE 130 - 139MM HG: CPT | Mod: CPTII,,, | Performed by: INTERNAL MEDICINE

## 2024-09-25 NOTE — ASSESSMENT & PLAN NOTE
Ultrasound reviewed by JO Mclaughlin--Imaging done for HCC screening. No evidence of HCC or ascites, however US limited due to cirrhosis. Plan for CT/MRI in 6 months for next HCC screening.\"  Message left for pt call clinic to review.   Well controlled.   Continue Losartan 25 mg p.o. daily and carvedilol 12.5 mg p.o. b.i.d. as well as isosorbide 30 mg p.o. daily  Low Sodium Diet (DASH Diet - Less than 2 grams of sodium per day).  Monitor blood pressure daily and log. Report consistent numbers greater than 140/90.  Maintain healthy weight with goal BMI <30. Exercise 30 minutes per day, 5 days per week.

## 2024-09-25 NOTE — PROGRESS NOTES
Subjective:      Patient ID: Eve Altman is a 79 y.o. female.    Chief Complaint: Follow-up (3 month diabetes/)    Ms. Prado is a 79-year-old female with hypertension, hyperlipidemia, CAD status post PTCA to LAD and obtuse marginal, diabetes mellitus type 2, interstitial cystitis that is here today for her three-month follow-up.  On Mounjaro and seems to be doing well.  Also on glyburide and metformin.    HGB A1c noted to 6.0.  Discussed going up on the dose of the Mounjaro to 7.5 mg subQ weekly and discontinuing the glyburide and metformin and she is in agreement.      MCW:  02/08/2024      The patient's Health Maintenance was reviewed and the following appears to be due at this time:   Health Maintenance Due   Topic Date Due    TETANUS VACCINE  Never done    Shingles Vaccine (1 of 2) Never done    RSV Vaccine (Age 60+ and Pregnant patients) (1 - 1-dose 60+ series) Never done    COVID-19 Vaccine (3 - 2023-24 season) 09/01/2024        Past Medical History:  Past Medical History:   Diagnosis Date    Chronic GERD     Coronary artery disease     Essential (primary) hypertension     H/O heart artery stent     HLD (hyperlipidemia)     Hypothyroidism, unspecified     IC (interstitial cystitis)     Osteoarthritis of right knee     Osteopenia     Stress incontinence (female) (male)     Type 2 diabetes mellitus without complications      Past Surgical History:   Procedure Laterality Date    Bone and/or joint imaging; whole body   06/24/2019    BUNIONECTOMY      CATARACT EXTRACTION W/  INTRAOCULAR LENS IMPLANT Bilateral     CHOLECYSTECTOMY      COLONOSCOPY  03/11/2013    CORONARY ANGIOPLASTY WITH STENT PLACEMENT      x 3    ESOPHAGEAL DILATION      ESOPHAGOGASTRODUODENOSCOPY  04/16/2019    ESOPHAGOGASTRODUODENOSCOPY  03/04/2013    EYE SURGERY      HYSTERECTOMY      LUMBAR SPINE SURGERY  07/24/2019    OPEN REDUCTION AND INTERNAL FIXATION (ORIF) OF INJURY OF SHOULDER Right     ROBOTIC ARTHROPLASTY, KNEE Right 04/17/2023     Procedure: ROBOTIC ARTHROPLASTY, KNEE, TOTAL;  Surgeon: Yung Allen MD;  Location: [facility];  Service: Orthopedics;  Laterality: Right;    SPINE SURGERY  July 2019    Lower back    Suspension of bladder      x2    TUBAL LIGATION  1975     Review of patient's allergies indicates:   Allergen Reactions    Adhesive Dermatitis    Niacin Shortness Of Breath     Other reaction(s): UNKNOWN  Redness and SOB  Other reaction(s): Not available    Lisinopril Other (See Comments)    Latex Other (See Comments)     Other reaction(s): UNKNOWN    Statins-hmg-coa reductase inhibitors Other (See Comments)     Social History     Socioeconomic History    Marital status:    Tobacco Use    Smoking status: Never     Passive exposure: Never    Smokeless tobacco: Never   Substance and Sexual Activity    Alcohol use: Not Currently    Drug use: Never    Sexual activity: Yes     Partners: Female     Social Determinants of Health     Financial Resource Strain: Low Risk  (9/18/2024)    Overall Financial Resource Strain (CARDIA)     Difficulty of Paying Living Expenses: Not hard at all   Food Insecurity: No Food Insecurity (9/18/2024)    Hunger Vital Sign     Worried About Running Out of Food in the Last Year: Never true     Ran Out of Food in the Last Year: Never true   Transportation Needs: No Transportation Needs (10/2/2023)    PRAPARE - Transportation     Lack of Transportation (Medical): No     Lack of Transportation (Non-Medical): No   Physical Activity: Insufficiently Active (9/18/2024)    Exercise Vital Sign     Days of Exercise per Week: 1 day     Minutes of Exercise per Session: 20 min   Stress: No Stress Concern Present (9/18/2024)    [employer] [place] [employer] - Occupational Stress Questionnaire     Feeling of Stress : Not at all   Housing Stability: Low Risk  (10/2/2023)    Housing Stability Vital Sign     Unable to Pay for Housing in the Last Year: No     Number of Places Lived in the Last Year: 1     Unstable  "Housing in the Last Year: No     Family History   Problem Relation Name Age of Onset    Heart attack Mother Kortney     Heart disease Mother Kortney     Diabetes Father Juan Carlos     Cancer Sister Mari     Idiopathic pulmonary fibrosis Brother      Cancer Brother Juan Carlos love.         I P F       Review of Systems    A comprehensive review of systems was performed and is negative except for that stated above  Objective:   /68 (BP Location: Left arm, Patient Position: Sitting, BP Method: Small (Manual))   Pulse 90   Ht 5' 1" (1.549 m)   Wt 77.6 kg (171 lb)   SpO2 95%   BMI 32.31 kg/m²     Physical Exam  Constitutional:       Appearance: Normal appearance.   HENT:      Head: Normocephalic and atraumatic.      Nose: Nose normal.      Mouth/Throat:      Mouth: Mucous membranes are moist.      Pharynx: Oropharynx is clear.   Eyes:      Extraocular Movements: Extraocular movements intact.      Pupils: Pupils are equal, round, and reactive to light.   Cardiovascular:      Rate and Rhythm: Normal rate and regular rhythm.      Pulses: Normal pulses.   Pulmonary:      Effort: Pulmonary effort is normal.      Breath sounds: Normal breath sounds.   Abdominal:      General: Bowel sounds are normal.      Palpations: Abdomen is soft.   Musculoskeletal:         General: Normal range of motion.      Cervical back: Normal range of motion and neck supple.   Skin:     General: Skin is warm.   Neurological:      General: No focal deficit present.      Mental Status: She is alert and oriented to person, place, and time. Mental status is at baseline.   Psychiatric:         Mood and Affect: Mood normal.       Assessment/ Plan:   1. Need for vaccination  -     influenza (adjuvanted) (Fluad) 45 mcg/0.5 mL IM vaccine (> or = 64 yo) 0.5 mL    2. Essential (primary) hypertension  Assessment & Plan:  Well controlled.   Continue Losartan 25 mg p.o. daily and carvedilol 12.5 mg p.o. b.i.d. as well as isosorbide 30 mg p.o. daily  Low Sodium Diet " (DASH Diet - Less than 2 grams of sodium per day).  Monitor blood pressure daily and log. Report consistent numbers greater than 140/90.  Maintain healthy weight with goal BMI <30. Exercise 30 minutes per day, 5 days per week.             3. Mixed hyperlipidemia  Assessment & Plan:  Not on a statin at this time  Stressed importance of dietary modifications. Follow a low cholesterol, low saturated fat diet with less that 200mg of cholesterol a day.  Avoid fried foods and high saturated fats (high saturated fats less than 7% of calories).  Add Flax Seed/Fish Oil supplements to diet. Increase dietary fiber.  Regular exercise can reduce LDL and raise HDL. Stressed importance of physical activity 5 times per week for 30 minutes per day.        4. Type 2 diabetes mellitus without complication, without long-term current use of insulin  Assessment & Plan:  -patient advised on the importance of avoiding excessive carbohydrates and sugary food intake and having some form of routine aerobic exercise on a regular basis.   -discontinue glyburide metformin  -going up on the dose of Mounjaro to 7.5 mg subQ weekly      Other orders  -     tirzepatide 7.5 mg/0.5 mL PnIj; Inject 7.5 mg into the skin every 7 days.  Dispense: 4 Pen; Refill: 5

## 2024-09-25 NOTE — ASSESSMENT & PLAN NOTE
-patient advised on the importance of avoiding excessive carbohydrates and sugary food intake and having some form of routine aerobic exercise on a regular basis.   -discontinue glyburide metformin  -going up on the dose of Mounjaro to 7.5 mg subQ weekly

## 2024-09-25 NOTE — ASSESSMENT & PLAN NOTE
Not on a statin at this time  Stressed importance of dietary modifications. Follow a low cholesterol, low saturated fat diet with less that 200mg of cholesterol a day.  Avoid fried foods and high saturated fats (high saturated fats less than 7% of calories).  Add Flax Seed/Fish Oil supplements to diet. Increase dietary fiber.  Regular exercise can reduce LDL and raise HDL. Stressed importance of physical activity 5 times per week for 30 minutes per day.

## 2024-12-04 ENCOUNTER — LAB VISIT (OUTPATIENT)
Dept: LAB | Facility: HOSPITAL | Age: 79
End: 2024-12-04
Attending: INTERNAL MEDICINE
Payer: MEDICARE

## 2024-12-04 ENCOUNTER — TELEPHONE (OUTPATIENT)
Dept: INTERNAL MEDICINE | Facility: CLINIC | Age: 79
End: 2024-12-04
Payer: MEDICARE

## 2024-12-04 DIAGNOSIS — E11.9 TYPE 2 DIABETES MELLITUS WITHOUT COMPLICATION, UNSPECIFIED WHETHER LONG TERM INSULIN USE: Primary | Chronic | ICD-10-CM

## 2024-12-04 DIAGNOSIS — E78.2 MIXED HYPERLIPIDEMIA: ICD-10-CM

## 2024-12-04 DIAGNOSIS — I10 ESSENTIAL (PRIMARY) HYPERTENSION: ICD-10-CM

## 2024-12-04 DIAGNOSIS — E11.9 TYPE 2 DIABETES MELLITUS WITHOUT COMPLICATION, WITHOUT LONG-TERM CURRENT USE OF INSULIN: Primary | ICD-10-CM

## 2024-12-04 LAB
ANION GAP SERPL CALC-SCNC: 9 MEQ/L
BUN SERPL-MCNC: 10.9 MG/DL (ref 9.8–20.1)
CALCIUM SERPL-MCNC: 9.2 MG/DL (ref 8.4–10.2)
CHLORIDE SERPL-SCNC: 104 MMOL/L (ref 98–107)
CHOLEST SERPL-MCNC: 203 MG/DL
CHOLEST/HDLC SERPL: 4 {RATIO} (ref 0–5)
CO2 SERPL-SCNC: 25 MMOL/L (ref 23–31)
CREAT SERPL-MCNC: 1 MG/DL (ref 0.55–1.02)
CREAT/UREA NIT SERPL: 11
GFR SERPLBLD CREATININE-BSD FMLA CKD-EPI: 57 ML/MIN/1.73/M2
GLUCOSE SERPL-MCNC: 142 MG/DL (ref 82–115)
HDLC SERPL-MCNC: 58 MG/DL (ref 35–60)
LDLC SERPL CALC-MCNC: 113 MG/DL (ref 50–140)
POTASSIUM SERPL-SCNC: 4.2 MMOL/L (ref 3.5–5.1)
SODIUM SERPL-SCNC: 138 MMOL/L (ref 136–145)
TRIGL SERPL-MCNC: 160 MG/DL (ref 37–140)
VLDLC SERPL CALC-MCNC: 32 MG/DL

## 2024-12-04 PROCEDURE — 80061 LIPID PANEL: CPT

## 2024-12-04 PROCEDURE — 36415 COLL VENOUS BLD VENIPUNCTURE: CPT

## 2024-12-04 PROCEDURE — 80048 BASIC METABOLIC PNL TOTAL CA: CPT

## 2024-12-04 NOTE — TELEPHONE ENCOUNTER
----- Message from Darlene sent at 12/4/2024  3:26 PM CST -----  .Who Called: Eve Altman    Caller is requesting assistance/information from provider's office.    Symptoms (please be specific): n/a   How long has patient had these symptoms:  n/a  List of preferred pharmacies on file (remove unneeded): [unfilled]  If different, enter pharmacy into here including location and phone number: n/a      Preferred Method of Contact: Phone Call    Patient's Preferred Phone Number on File: [phone]     Best Call Back Number, if different:    Additional Information: Pt stated she was advised by Dr. Mansfield to discontinue her metformin. She's says since them she cannot get her sugar right, saying it's not going under 130. Pt is wanting to know does she need to go back to taking the metformin or will she receive an increase on her mounjaro. Please advise, thank you.

## 2025-01-14 DIAGNOSIS — M79.10 MYALGIA DUE TO STATIN: Primary | ICD-10-CM

## 2025-01-14 DIAGNOSIS — T46.6X5A MYALGIA DUE TO STATIN: Primary | ICD-10-CM

## 2025-01-14 RX ORDER — ROPINIROLE 1 MG/1
2 TABLET, FILM COATED ORAL 3 TIMES DAILY
Qty: 90 TABLET | Refills: 3 | Status: SHIPPED | OUTPATIENT
Start: 2025-01-14

## 2025-01-27 DIAGNOSIS — K21.9 CHRONIC GERD: ICD-10-CM

## 2025-01-27 RX ORDER — PANTOPRAZOLE SODIUM 40 MG/1
40 TABLET, DELAYED RELEASE ORAL
Qty: 30 TABLET | Refills: 6 | Status: SHIPPED | OUTPATIENT
Start: 2025-01-27

## 2025-02-03 DIAGNOSIS — Z13.29 SCREENING FOR ENDOCRINE, NUTRITIONAL, METABOLIC AND IMMUNITY DISORDER: ICD-10-CM

## 2025-02-03 DIAGNOSIS — I25.118 ATHEROSCLEROTIC HEART DISEASE OF NATIVE CORONARY ARTERY WITH OTHER FORMS OF ANGINA PECTORIS: ICD-10-CM

## 2025-02-03 DIAGNOSIS — E11.9 TYPE 2 DIABETES MELLITUS WITHOUT COMPLICATION, UNSPECIFIED WHETHER LONG TERM INSULIN USE: Primary | ICD-10-CM

## 2025-02-03 DIAGNOSIS — I10 HYPERTENSION, UNSPECIFIED TYPE: ICD-10-CM

## 2025-02-03 DIAGNOSIS — Z13.83 SCREENING FOR CARDIOVASCULAR, RESPIRATORY, AND GENITOURINARY DISEASES: ICD-10-CM

## 2025-02-03 DIAGNOSIS — Z13.0 SCREENING FOR ENDOCRINE, NUTRITIONAL, METABOLIC AND IMMUNITY DISORDER: ICD-10-CM

## 2025-02-03 DIAGNOSIS — Z79.899 ENCOUNTER FOR LONG-TERM (CURRENT) USE OF MEDICATIONS: ICD-10-CM

## 2025-02-03 DIAGNOSIS — Z13.21 SCREENING FOR ENDOCRINE, NUTRITIONAL, METABOLIC AND IMMUNITY DISORDER: ICD-10-CM

## 2025-02-03 DIAGNOSIS — N18.31 CHRONIC KIDNEY DISEASE, STAGE 3A: ICD-10-CM

## 2025-02-03 DIAGNOSIS — Z00.00 MEDICARE ANNUAL WELLNESS VISIT, SUBSEQUENT: ICD-10-CM

## 2025-02-03 DIAGNOSIS — Z13.89 SCREENING FOR CARDIOVASCULAR, RESPIRATORY, AND GENITOURINARY DISEASES: ICD-10-CM

## 2025-02-03 DIAGNOSIS — Z13.228 SCREENING FOR ENDOCRINE, NUTRITIONAL, METABOLIC AND IMMUNITY DISORDER: ICD-10-CM

## 2025-02-03 DIAGNOSIS — Z13.6 SCREENING FOR CARDIOVASCULAR, RESPIRATORY, AND GENITOURINARY DISEASES: ICD-10-CM

## 2025-02-04 ENCOUNTER — TELEPHONE (OUTPATIENT)
Dept: INTERNAL MEDICINE | Facility: CLINIC | Age: 80
End: 2025-02-04
Payer: MEDICARE

## 2025-02-04 NOTE — TELEPHONE ENCOUNTER
----- Message from Med Assistant Yang sent at 2/3/2025  2:27 PM CST -----  Regarding:  Tuesday 2-11-25  Wellness Appointment    Fasting wellness labs ordered and ready to do.     Last Wellness 2-8-24  
yes

## 2025-02-07 ENCOUNTER — LAB VISIT (OUTPATIENT)
Dept: LAB | Facility: HOSPITAL | Age: 80
End: 2025-02-07
Attending: INTERNAL MEDICINE
Payer: MEDICARE

## 2025-02-07 DIAGNOSIS — E11.9 TYPE 2 DIABETES MELLITUS WITHOUT COMPLICATION, UNSPECIFIED WHETHER LONG TERM INSULIN USE: ICD-10-CM

## 2025-02-07 DIAGNOSIS — Z13.6 SCREENING FOR CARDIOVASCULAR, RESPIRATORY, AND GENITOURINARY DISEASES: ICD-10-CM

## 2025-02-07 DIAGNOSIS — Z13.0 SCREENING FOR ENDOCRINE, NUTRITIONAL, METABOLIC AND IMMUNITY DISORDER: ICD-10-CM

## 2025-02-07 DIAGNOSIS — I25.118 ATHEROSCLEROTIC HEART DISEASE OF NATIVE CORONARY ARTERY WITH OTHER FORMS OF ANGINA PECTORIS: ICD-10-CM

## 2025-02-07 DIAGNOSIS — Z13.83 SCREENING FOR CARDIOVASCULAR, RESPIRATORY, AND GENITOURINARY DISEASES: ICD-10-CM

## 2025-02-07 DIAGNOSIS — Z00.00 MEDICARE ANNUAL WELLNESS VISIT, SUBSEQUENT: ICD-10-CM

## 2025-02-07 DIAGNOSIS — Z13.228 SCREENING FOR ENDOCRINE, NUTRITIONAL, METABOLIC AND IMMUNITY DISORDER: ICD-10-CM

## 2025-02-07 DIAGNOSIS — Z13.21 SCREENING FOR ENDOCRINE, NUTRITIONAL, METABOLIC AND IMMUNITY DISORDER: ICD-10-CM

## 2025-02-07 DIAGNOSIS — N18.31 CHRONIC KIDNEY DISEASE, STAGE 3A: ICD-10-CM

## 2025-02-07 DIAGNOSIS — Z79.899 ENCOUNTER FOR LONG-TERM (CURRENT) USE OF MEDICATIONS: ICD-10-CM

## 2025-02-07 DIAGNOSIS — Z13.89 SCREENING FOR CARDIOVASCULAR, RESPIRATORY, AND GENITOURINARY DISEASES: ICD-10-CM

## 2025-02-07 DIAGNOSIS — Z13.29 SCREENING FOR ENDOCRINE, NUTRITIONAL, METABOLIC AND IMMUNITY DISORDER: ICD-10-CM

## 2025-02-07 DIAGNOSIS — I10 HYPERTENSION, UNSPECIFIED TYPE: ICD-10-CM

## 2025-02-07 LAB
ALBUMIN SERPL-MCNC: 3.9 G/DL (ref 3.4–4.8)
ALBUMIN/GLOB SERPL: 1.3 RATIO (ref 1.1–2)
ALP SERPL-CCNC: 93 UNIT/L (ref 40–150)
ALT SERPL-CCNC: 13 UNIT/L (ref 0–55)
ANION GAP SERPL CALC-SCNC: 8 MEQ/L
AST SERPL-CCNC: 16 UNIT/L (ref 5–34)
BASOPHILS # BLD AUTO: 0.01 X10(3)/MCL
BASOPHILS NFR BLD AUTO: 0.2 %
BILIRUB SERPL-MCNC: 0.5 MG/DL
BUN SERPL-MCNC: 13.2 MG/DL (ref 9.8–20.1)
CALCIUM SERPL-MCNC: 9.2 MG/DL (ref 8.4–10.2)
CHLORIDE SERPL-SCNC: 105 MMOL/L (ref 98–107)
CHOLEST SERPL-MCNC: 230 MG/DL
CHOLEST/HDLC SERPL: 4 {RATIO} (ref 0–5)
CO2 SERPL-SCNC: 26 MMOL/L (ref 23–31)
CREAT SERPL-MCNC: 1.14 MG/DL (ref 0.55–1.02)
CREAT UR-MCNC: 258.7 MG/DL (ref 45–106)
CREAT/UREA NIT SERPL: 12
EOSINOPHIL # BLD AUTO: 0.08 X10(3)/MCL (ref 0–0.9)
EOSINOPHIL NFR BLD AUTO: 1.3 %
ERYTHROCYTE [DISTWIDTH] IN BLOOD BY AUTOMATED COUNT: 16.9 % (ref 11.5–17)
EST. AVERAGE GLUCOSE BLD GHB EST-MCNC: 145.6 MG/DL
GFR SERPLBLD CREATININE-BSD FMLA CKD-EPI: 49 ML/MIN/1.73/M2
GLOBULIN SER-MCNC: 3 GM/DL (ref 2.4–3.5)
GLUCOSE SERPL-MCNC: 142 MG/DL (ref 82–115)
HBA1C MFR BLD: 6.7 %
HCT VFR BLD AUTO: 37.3 % (ref 37–47)
HDLC SERPL-MCNC: 56 MG/DL (ref 35–60)
HGB BLD-MCNC: 11.3 G/DL (ref 12–16)
IMM GRANULOCYTES # BLD AUTO: 0.02 X10(3)/MCL (ref 0–0.04)
IMM GRANULOCYTES NFR BLD AUTO: 0.3 %
LDLC SERPL CALC-MCNC: 140 MG/DL (ref 50–140)
LYMPHOCYTES # BLD AUTO: 1.25 X10(3)/MCL (ref 0.6–4.6)
LYMPHOCYTES NFR BLD AUTO: 20.5 %
MCH RBC QN AUTO: 23.5 PG (ref 27–31)
MCHC RBC AUTO-ENTMCNC: 30.3 G/DL (ref 33–36)
MCV RBC AUTO: 77.5 FL (ref 80–94)
MICROALBUMIN UR-MCNC: 10.4 UG/ML
MICROALBUMIN/CREAT RATIO PNL UR: 4 MG/GM CR (ref 0–30)
MONOCYTES # BLD AUTO: 0.46 X10(3)/MCL (ref 0.1–1.3)
MONOCYTES NFR BLD AUTO: 7.6 %
NEUTROPHILS # BLD AUTO: 4.27 X10(3)/MCL (ref 2.1–9.2)
NEUTROPHILS NFR BLD AUTO: 70.1 %
NRBC BLD AUTO-RTO: 0 %
PLATELET # BLD AUTO: 276 X10(3)/MCL (ref 130–400)
PMV BLD AUTO: 9.7 FL (ref 7.4–10.4)
POTASSIUM SERPL-SCNC: 4.3 MMOL/L (ref 3.5–5.1)
PROT SERPL-MCNC: 6.9 GM/DL (ref 5.8–7.6)
RBC # BLD AUTO: 4.81 X10(6)/MCL (ref 4.2–5.4)
SODIUM SERPL-SCNC: 139 MMOL/L (ref 136–145)
TRIGL SERPL-MCNC: 171 MG/DL (ref 37–140)
TSH SERPL-ACNC: 2.1 UIU/ML (ref 0.35–4.94)
VLDLC SERPL CALC-MCNC: 34 MG/DL
WBC # BLD AUTO: 6.09 X10(3)/MCL (ref 4.5–11.5)

## 2025-02-07 PROCEDURE — 84443 ASSAY THYROID STIM HORMONE: CPT

## 2025-02-07 PROCEDURE — 85025 COMPLETE CBC W/AUTO DIFF WBC: CPT

## 2025-02-07 PROCEDURE — 36415 COLL VENOUS BLD VENIPUNCTURE: CPT

## 2025-02-07 PROCEDURE — 80053 COMPREHEN METABOLIC PANEL: CPT

## 2025-02-07 PROCEDURE — 83036 HEMOGLOBIN GLYCOSYLATED A1C: CPT

## 2025-02-07 PROCEDURE — 80061 LIPID PANEL: CPT

## 2025-02-07 PROCEDURE — 82043 UR ALBUMIN QUANTITATIVE: CPT

## 2025-02-11 ENCOUNTER — OFFICE VISIT (OUTPATIENT)
Dept: INTERNAL MEDICINE | Facility: CLINIC | Age: 80
End: 2025-02-11
Payer: MEDICARE

## 2025-02-11 VITALS
SYSTOLIC BLOOD PRESSURE: 138 MMHG | OXYGEN SATURATION: 98 % | HEART RATE: 85 BPM | HEIGHT: 61 IN | DIASTOLIC BLOOD PRESSURE: 68 MMHG | WEIGHT: 169 LBS | BODY MASS INDEX: 31.91 KG/M2

## 2025-02-11 DIAGNOSIS — M79.10 MYALGIA DUE TO STATIN: ICD-10-CM

## 2025-02-11 DIAGNOSIS — E66.01 MORBID OBESITY: ICD-10-CM

## 2025-02-11 DIAGNOSIS — E11.29 MICROALBUMINURIA DUE TO TYPE 2 DIABETES MELLITUS: ICD-10-CM

## 2025-02-11 DIAGNOSIS — E11.9 TYPE 2 DIABETES MELLITUS WITHOUT COMPLICATION, WITHOUT LONG-TERM CURRENT USE OF INSULIN: Chronic | ICD-10-CM

## 2025-02-11 DIAGNOSIS — N18.31 CHRONIC KIDNEY DISEASE, STAGE 3A: ICD-10-CM

## 2025-02-11 DIAGNOSIS — R80.9 MICROALBUMINURIA DUE TO TYPE 2 DIABETES MELLITUS: ICD-10-CM

## 2025-02-11 DIAGNOSIS — Z23 NEED FOR SHINGLES VACCINE: ICD-10-CM

## 2025-02-11 DIAGNOSIS — F03.90 DEMENTIA, UNSPECIFIED DEMENTIA SEVERITY, UNSPECIFIED DEMENTIA TYPE, UNSPECIFIED WHETHER BEHAVIORAL, PSYCHOTIC, OR MOOD DISTURBANCE OR ANXIETY: ICD-10-CM

## 2025-02-11 DIAGNOSIS — T46.6X5A MYALGIA DUE TO STATIN: ICD-10-CM

## 2025-02-11 DIAGNOSIS — I10 ESSENTIAL (PRIMARY) HYPERTENSION: Chronic | ICD-10-CM

## 2025-02-11 DIAGNOSIS — Z78.0 POST-MENOPAUSAL: ICD-10-CM

## 2025-02-11 DIAGNOSIS — Z00.00 ENCOUNTER FOR PREVENTIVE HEALTH EXAMINATION: ICD-10-CM

## 2025-02-11 DIAGNOSIS — Z00.00 MEDICARE ANNUAL WELLNESS VISIT, SUBSEQUENT: Primary | ICD-10-CM

## 2025-02-11 NOTE — PROGRESS NOTES
"   Internal Medicine    Eve Altman is a 79 y.o. female here today for a Medicare Annual Wellness visit and comprehensive Health Risk Assessment.     Subjective     Ms. Prado is a 79-year-old female with hypertension, hyperlipidemia, CAD status post PTCA to LAD and obtuse marginal, diabetes mellitus type 2, interstitial cystitis that is here today for her Medicare wellness visit.  On Mounjaro and seems to be doing well.  At last visit discontinue glyburide and metformin.    HGB A1c noted to 6.7,  did go up from 6.0 at last visit.  On Mounjaro to 10 mg subQ weekly     MCW:  02/11/2025    The following components were reviewed and updated:  Medical history  Family History  Social history  Allergies  Current Medications  Immunizations  Health Maintenance  Patient Care Team    Review of Systems  A comprehensive review of systems was conducted and is negative except as noted above.     Objective   Visit Vitals  /68 (BP Location: Right arm, Patient Position: Sitting)   Pulse 85   Ht 5' 1" (1.549 m)   Wt 76.7 kg (169 lb)   SpO2 98%   BMI 31.93 kg/m²        Physical Exam  Constitutional:       Appearance: Normal appearance.   HENT:      Head: Normocephalic and atraumatic.      Nose: Nose normal.      Mouth/Throat:      Mouth: Mucous membranes are moist.      Pharynx: Oropharynx is clear.   Eyes:      Extraocular Movements: Extraocular movements intact.      Pupils: Pupils are equal, round, and reactive to light.   Cardiovascular:      Rate and Rhythm: Normal rate and regular rhythm.      Pulses: Normal pulses.           Dorsalis pedis pulses are 2+ on the right side and 2+ on the left side.   Pulmonary:      Effort: Pulmonary effort is normal.      Breath sounds: Normal breath sounds.   Abdominal:      General: Bowel sounds are normal.      Palpations: Abdomen is soft.   Musculoskeletal:         General: Normal range of motion.      Cervical back: Normal range of motion and neck supple.   Feet:      Right foot:    "   Protective Sensation: 3 sites tested.  3 sites sensed.      Skin integrity: No ulcer, skin breakdown or erythema.      Toenail Condition: Right toenails are normal.      Left foot:      Protective Sensation: 3 sites tested.  3 sites sensed.      Skin integrity: No ulcer, skin breakdown or erythema.      Toenail Condition: Left toenails are normal.   Skin:     General: Skin is warm.   Neurological:      General: No focal deficit present.      Mental Status: She is alert and oriented to person, place, and time. Mental status is at baseline.   Psychiatric:         Mood and Affect: Mood normal.          Assessment/Plan:  1. Medicare annual wellness visit, subsequent  Assessment & Plan:  -labs are reviewed all essentially normal  -patient is advised on importance of watching her carbohydrate intake and saturated fat intake, making the right nutritional choices and exercising on a regular basis  -up-to-date with the screening       2. Need for shingles vaccine  -     varicella-zoster [id number], PF, (SHINGRIX) 50 mcg/0.5 mL injection; Inject 0.5 mLs into the muscle once. for 1 dose  Dispense: 1 each; Refill: 0    3. Encounter for preventive health examination    4. Morbid obesity    5. Dementia, unspecified dementia severity, unspecified dementia type, unspecified whether behavioral, psychotic, or mood disturbance or anxiety  Assessment & Plan:  -on memantine 5 mg p.o. b.i.d., continue      6. Microalbuminuria due to type 2 diabetes mellitus  Assessment & Plan:  -on losartan, continue         7. Chronic kidney disease, stage 3a  Assessment & Plan:  Estimated Creatinine Clearance: 36.9 mL/min (A) (based on SCr of 1.14 mg/dL (H)).  Last GFR - . Stable from renal standpoint.  Continue  Follow renoprotective measures including Renal Diet (reduce intake of nuts, peanut butter, milk, cheese, dried beans, peas) and Low Sodium Diet (less than 2 grams per day).  Avoid NSAIDs (Aleve, Mobic, Celebrex, Ibuprofen, Advil, Toradol and  Diclofenac). May take Tylenol as needed for headache/pain.  Control DM with goal A1C <7. BP goal <130/80. LDL goal < 100.  Stay well hydrated. Avoid alcohol and soda. Limit tea and coffee.        8. Type 2 diabetes mellitus without complication, without long-term current use of insulin  Assessment & Plan:  -patient advised on the importance of avoiding excessive carbohydrates and sugary food intake and having some form of routine aerobic exercise on a regular basis.   -discontinue glyburide metformin  -going up on the dose of Mounjaro to 10 mg subQ weekly         9. Myalgia due to statin  Assessment & Plan:  -on red yeast rice extract, intolerant to statins      10. Essential (primary) hypertension  Assessment & Plan:  Well controlled.   Continue Losartan 25 mg p.o. daily and carvedilol 12.5 mg p.o. b.i.d. as well as isosorbide 30 mg p.o. daily  Low Sodium Diet (DASH Diet - Less than 2 grams of sodium per day).  Monitor blood pressure daily and log. Report consistent numbers greater than 140/90.  Maintain healthy weight with goal BMI <30. Exercise 30 minutes per day, 5 days per week.           A comprehensive HEALTH RISK ASSESSMENT was completed today. Results are summarized below:    There are NO EMOTIONAL/SOCIAL CONCERNS identified on today's screening for Social Isolation, Depression and Anxiety.    There are NO COGNITIVE FUNCTION CONCERNS identified on today's screening.    The following FUNCTIONAL AND/OR SAFETY CONCERNS were identified on today's screening for Physical Symptoms, Nutritional, Home Safety/Living Situation, Fall Risk, Activities of Daily Living, Independent Activities of Daily Living, Physical Activity,Timed Up and Go test and Whisper test::   *Patient reports recently FEELING DIZZY, has a FEAR OF FALLING or HAS FALLEN. (In the past four weeks have you felt dizzy when standing up, were afraid of falling or fallen?: (!) Yes)     The patient reports NO OPIOID PRESCRIPTIONS. This was confirmed  through medication reconciliation and the [facility] website.    The patient is NOT A TOBACCO USER.  The patient reports NO SIGNIFICANT ALCOHOL USE.     All Questions regarding food, transportation or housing were not answered today.      I provided Eve Altman with a 5-10 year written Screening Schedule per USPSTF age appropriate recommendations and a Personal Prevention Plan based on the results of today's Health Risk Assessment. Education, counseling, and referrals were provided as documented above and can be viewed in the After Visit Summary.    Follow up in about 4 months (around 6/11/2025) for BP Check, DM FU. In addition to this scheduled follow up, the patient has also been instructed to follow up on as needed basis.     none  The patient was asked and declined the use of a free .  Advance Care Planning   Today we discussed advance care planning. She is interested in learning more about how to make Advance Directives. Information was provided and I offered to discuss more at her discretion.     Advance Care Planning     Date: 02/11/2025  Patient did not wish or was not able to name a surrogate decision maker or provide an Advance Care Plan.

## 2025-02-11 NOTE — ASSESSMENT & PLAN NOTE
Estimated Creatinine Clearance: 36.9 mL/min (A) (based on SCr of 1.14 mg/dL (H)).  Last GFR - . Stable from renal standpoint.  Continue  Follow renoprotective measures including Renal Diet (reduce intake of nuts, peanut butter, milk, cheese, dried beans, peas) and Low Sodium Diet (less than 2 grams per day).  Avoid NSAIDs (Aleve, Mobic, Celebrex, Ibuprofen, Advil, Toradol and Diclofenac). May take Tylenol as needed for headache/pain.  Control DM with goal A1C <7. BP goal <130/80. LDL goal < 100.  Stay well hydrated. Avoid alcohol and soda. Limit tea and coffee.

## 2025-02-11 NOTE — ASSESSMENT & PLAN NOTE
-patient advised on the importance of avoiding excessive carbohydrates and sugary food intake and having some form of routine aerobic exercise on a regular basis.   -discontinue glyburide metformin  -going up on the dose of Mounjaro to 10 mg subQ weekly

## 2025-02-11 NOTE — PATIENT INSTRUCTIONS
Medicare Annual Wellness Visit      Patient Name: Eve Altman  Today's Date: 2/11/2025    Below you will find your 5-10 year Screening Plan Recommendations:  Health Maintenance       Date Due Completion Date    TETANUS VACCINE Never done ---    Shingles Vaccine (1 of 2) Never done ---    RSV Vaccine (Age 60+ and Pregnant patients) (1 - 1-dose 75+ series) Never done ---    COVID-19 Vaccine (3 - 2024-25 season) 09/01/2024 3/4/2021    Diabetic Eye Exam 12/29/2024 12/29/2023    DEXA Scan 03/25/2025 3/25/2022    Hemoglobin A1c 08/07/2025 2/7/2025    Aspirin/Antiplatelet Therapy 09/25/2025 9/25/2024    Diabetes Urine Screening 02/07/2026 2/7/2025    Lipid Panel 02/07/2026 2/7/2025          Below is your summarized Personalized Prevention Plan that addresses any concerns we discussed today at your visit. Please see attached detailed information specific to your Health Concerns.  No orders of the defined types were placed in this encounter.        The following information is provided to all patients.  This information is to help you find additional resources for any problems that may be affecting your health: Living healthy guide: www.[facility].[place].gov      Understanding Diabetes: www.diabetes.org      Eating healthy: www.cdc.gov/healthyweight      CDC home safety checklist: www.cdc.gov/steadi/patient.html      Agency on Aging: www.goea.[place].[facility]      Alcoholics anonymous (AA): www.aa.org      Physical Activity: www.dayana.nih.gov/aj0ndwg      Tobacco use: www.quitwithusla.org                                 Patient Education       Weight Loss Tips   About this topic   More and more people are concerned about their weight. You can choose from many different programs. The goal of a weight loss program may be to cut down on calories or to lose extra weight through exercise.  Losing weight may mean changing your ideas about food. Going on a diet and losing weight does not mean starving yourself. It means cutting down on  "the amount of food you eat, making healthy food choices, and being active.  General   Ideally, you need to lose 1 to 2 pounds (0.5 to 1 kg) a week for a healthy weight loss. Losing too much weight too fast is not good. When you take in fewer calories, you will lose weight. Your ideal calorie and weight goal depends on your current age, weight, height, and personal goals. Ask your doctor or dietitian what your ideal weight is.  You need to burn 3500 calories to lose 1 pound (0.5 kg). That means cutting out 500 calories every day for 7 days. You can cut out 500 calories per day by eating or drinking fewer calories, burning them through exercise, or doing both. To lose that extra weight and stay healthy:  Take time to exercise.  Exercise regularly. Burn calories with activity and exercise. Exercise can help you lose weight and it also strengthens your muscles. Set a schedule where you will have time to do exercises. With just 30 to 60 minutes of exercise each day, you could burn 500 extra calories. Your metabolism stays elevated for a period of time after exercise.  If you don't have time for a 30 minute workout, try three 10 minute exercises each day.  If you work near your home, walk to work. Walking is a very good form of exercise.  Take a 20 minute walk each day. Walk during your lunch break. Park far away, so you have to walk more.  Take the steps instead of elevators. You will burn more calories this way.  If you have an illness, like diabetes or high blood pressure, ask your doctor how much exercise is right for you.  Choose healthy snacks.  Low calorie healthy snacks are a good thing. They help your blood sugar stay even and prevent you from overeating at meals. Choose a balanced snack, such as a small apple with 2 tablespoons (30 grams) of peanut butter.  Keep in mind, even "low calorie" foods can add up. Just because you choose low calorie foods does not mean you do not have to count the calories you " eat.  Pack a few fresh fruits or a small salad to take to work or school. Avoid buying a snack at the nearest vending machine.  When you feel thirsty, drink water. Water has no calories and is a very good thirst quencher.  Plan healthy meals.  Plan ahead. Keep a diary of foods that are low in calories. You can also make a list of meal plans for your breakfast, lunch, and dinner. Planning ahead will prevent you from eating out at a fast food place or restaurant.  Make a grocery list before shopping so you only buy food you need. Don't go to the store hungry.  Visit a dietitian. This person will help you make meal plans that will help you lose weight.  Add fiber to your meals. Adding fiber helps you to feel full for a longer amount of time.  Take care when eating out.  Choose lower fat and lower calorie meals. Try a seafood, lean meat, or vegetarian entrée.  Share a meal with a friend.  Try a salad and appetizer instead of an entree.  Ask for a to-go box when dinner is served and put half of your meal in it for a later meal.  Have fruit for dessert.  Drink water instead of other high calorie drinks.  Learn not to overeat.  Watch your portions. For example, the recommended serving size of meat is 3 ounces (90 grams). This is the size of a deck of playing cards. Two tablespoons (30 grams) of peanut butter is the size of a ping-pong ball. One medium fruit is the size of a baseball.  Use a smaller plate or glass during dinner for less calorie intake.  Try drinking a glass or two of water before eating. This may make you feel more full and help you to eat less.  Eat slowly. Take at least 30 minutes to eat. This gives time for your brain to tell your stomach you are full. This will help you avoid overeating.  Some people eat smaller meals more often to help not to overeat. If you can eat six small meals, make them healthy and low calorie. If three meals are best for you, know your calorie level for the day and spread it out  into three healthy low calorie meals.     What will the results be?   Losing weight may make you healthier. You also may have more energy for your daily activities. You may lower disease risk. You may also add years to your life.  What changes to diet are needed?   Learn how to read nutrition labels. Know the serving size. Knowing the calories in an item will help you make healthier choices and lose weight.  Keep a diary of the food you eat. This will help you count the calories you are taking in.  Make a menu in advance. This will help you make good choices to include in your diet.  Avoid eating 2 hours before bedtime to allow for digestion. If you eat right before you go to bed, you may also have worse heartburn.  Be sure to count the calories in the things you drink. You may want to stop drinking soda pop, beer, wine, and mixed drinks (alcohol). Some coffee drinks also have a lot of calories in them.  Who should use this diet?   A weight loss diet may be needed for people with a calculated body mass index of 25 and over. This means you are overweight or obese.  Who should not use this diet?   People with BMI of 18.5 or lower should not use this diet. Do not use this diet if your doctor does not recommend weight loss.  What foods are good to eat?   Choose foods that are nutritious. Remember, portion control is key. Even a low calorie food can become high in calories if you have too big of a serving. Here is a list of foods that are good to eat:  Vegetables:   Broccoli  Asparagus  Spinach  Green leafy vegetables  Tomatoes  Onions  Mushrooms  Cucumbers  Zucchini  Lean proteins:   Egg whites  Beans including kidney, navy, black, and chickpeas  Grilled, broiled, or baked skinless chicken breast  Grilled, broiled, or baked skinless turkey breast  Lean beef  [place] meat  Grilled, broiled, or baked fish  Beans  Nuts, such as almonds, cashews, and pistachios  Seeds  Whole grains and carbs:   Oatmeal  Brown rice  Sweet  potatoes  Cereal  Whole grain bread or pasta  Fruits:   Apples  Grapefruit  Blueberries  Oranges  Bananas  Grapes  Peaches  Pineapple  Strawberries  Dairy:   Fat-free or low-fat milk and cheese  Low fat yogurt  Soy, rice, or almond milk  What foods should be limited or avoided?   Limit or avoid foods that are high in calories like:  Junk foods  Fried foods  Fatty foods  Processed meats  Food with saturated and trans fat  Whole fat dairy products  Butter  Cheese  Ice cream  Food and drinks with a lot of sugar. Some examples are beer, wine, mixed drinks (alcohol), carbonated sodas, cakes, and cookies.  When do I need to call the doctor?   Weakness  Fast heartbeat  Dizziness  Helpful tips   Join a support group and an exercise group. It is much easier to lose weight if you have support and encouragement.  Do not skip meals. If you skip a meal, you most likely will overeat at that next meal.  Eat at the dining room table instead in front of the TV to help monitor intake.  Where can I learn more?   Academy of Nutrition and Dietetics  https://www.eatright.org/health/weight-loss/your-health-and-your-weight/back-to-basics-for-healthy-weight-loss   Centers for Disease Control and Prevention  https://www.cdc.gov/healthyweight/   Weight-Control Information Network  https://www.niddk.nih.gov/health-information/diet-nutrition/changing-habits-better-health   Last Reviewed Date   2021-08-09  Consumer Information Use and Disclaimer   This information is not specific medical advice and does not replace information you receive from your health care provider. This is only a brief summary of general information. It does NOT include all information about conditions, illnesses, injuries, tests, procedures, treatments, therapies, discharge instructions or life-style choices that may apply to you. You must talk with your health care provider for complete information about your health and treatment options. This information should not be  used to decide whether or not to accept your health care providers advice, instructions or recommendations. Only your health care provider has the knowledge and training to provide advice that is right for you.  Copyright   Copyright © 2021 UpToDate, Inc. and its affiliates and/or licensors. All rights reserved.    Patient Education       Health Risks of a High BMI   About this topic   Your weight and health depend on a few things. Doctors use a method called BMI or body mass index as a tool to learn more about your risk of having health problems. This tool uses your weight and your height to find your BMI.  BMI does not include things like your habits, where you live, family history, or amount of body fat. Some people with a normal BMI are still not healthy. Other people with a high BMI may be healthy. Most of the time, a person with a higher BMI is less healthy and will need more care. Ask your doctor for their view of your total health during a well visit or physical.  Being overweight or having a high BMI can hurt many parts of your body. Learn about your BMI and how your weight changes your health risks. Then you can make changes to keep yourself as healthy as you can.  General   Weighing too much can be very harmful to your body. It can cause many illnesses and can make it hard to move about.  Blood Sugar   You have a greater chance of having high blood sugar or diabetes if you weigh too much. Your body normally makes a hormone called insulin. The insulin allows your body to use the sugar in your blood.  The cells in your body may not be able to use insulin. Then your cells cannot get the sugar from your bloodstream that they need for energy. Your pancreas has to work extra hard to try and make enough insulin to keep your blood sugar healthy.  If you lose weight and exercise, your body is able to control your blood sugar levels better. You may not need as much insulin to keep your blood sugar levels  healthy.  Your Heart   Being overweight makes your heart work harder.  The blood vessels that bring blood to your heart muscle may become narrow or blocked and cause a heart attack or your heart may not pump as well as it should. Your heart rate may not be normal.  Losing weight can lower your chances of having problems with your heart.  High Blood Pressure   Your heart has to work harder to pump blood through a larger body and to make sure all of your cells have the oxygen they need.  As your heart has to work harder, your blood pressure goes up.  Being overweight can also harm your kidneys and this may also raise your blood pressure.  You may be able to lower your blood pressure through weight loss and routine exercise.  Stroke   Strokes are more likely to happen when someone has high blood pressure, heart problems, high blood sugar, or high cholesterol.  Being overweight puts you at a higher risk for all of these health problems. These problems put you at a higher risk for having a stroke.  Losing weight may help lower your blood pressure, which is the biggest risk factor for a stroke.  Cholesterol   Your cholesterol level is likely to be higher if you are overweight.  This can lead to narrowing of the blood vessels in your heart, neck, or other parts of your body. Then you may have chest pain or signs of low blood flow to a certain area. It can also lead to a heart attack or stroke.  Lowering your weight and changing what you eat may change your cholesterol levels.  Your Liver and Kidneys   You are more likely to have certain problems with your liver or kidneys if you have a high BMI.  Fatty liver disease is caused by a buildup of fat in your liver. Then your liver may not work as well as it should.  You are at a higher risk for diabetes if you are overweight. This illness can cause kidney problems.  There is no exact way to treat fatty liver disease. By losing weight, you may keep your liver from getting any  worse and help your liver work better.  By losing weight, you lower your chance of having kidney problems. If you already have kidney problems, weight loss may help keep your disease from getting worse.  Bone and Joint Problems   Weighing too much can put a lot of stress on your joints.  The extra weight may make the cartilage wear away more quickly from your bones. Your cartilage lines the surfaces of your bones to help your joints glide more easily.  When your cartilage is worn, your joints become stiff and sore.  Losing weight and exercising is one of the best ways to treat joint pain and stiffness. It can also ease the stress on your hips, knees, and back.  Cancer Risk   Gaining weight and poor health habits raise your risk for some kinds of cancer.  Healthy eating and exercise may lower your risk for some kinds of cancer.  Sleep   With a high BMI, you may have extra fat around your neck. This can make your airway smaller and put you at risk for a problem called sleep apnea. With this problem, your breathing starts and stops when you are sleeping.  Signs of sleep apnea include snoring, feeling sleepy during the day, and problems with focusing.  Sleep apnea can lead to heart problems such as increased risk for heart disease and arrhythmias like atrial fibrillation.  Losing weight can lower the amount of fat around your neck and ease sleep apnea problems.  Pregnancy   Your weight can affect how often you have a period. It may also make it harder for you to get pregnant. A high BMI can cause problems for both mom and baby.  If you are overweight and pregnant you are at a higher risk for high blood sugar or high blood pressure while you are pregnant.  You are also more likely to have your baby early or to need a C section.  Losing weight before you become pregnant may lower your chance for these problems. If you are pregnant, talk to your doctor before you try to lose weight.  Depression   You are more likely to  suffer from depression or low mood when you have a high BMI.  You may have a low mood because of low self-esteem, lack of activity, lack of sleep, and health problems caused by being overweight.  Losing weight and finding out the reasons you overeat are some of the steps to improve your quality of life and deal with depression.  Where can I learn more?   National [place] of Diabetes and Digestive and Kidney Diseases  https://www.niddk.nih.gov/health-information/weight-management/adult-overweight-obesity/health-risks   Last Reviewed Date   2021-09-15  Consumer Information Use and Disclaimer   This information is not specific medical advice and does not replace information you receive from your health care provider. This is only a brief summary of general information. It does NOT include all information about conditions, illnesses, injuries, tests, procedures, treatments, therapies, discharge instructions or life-style choices that may apply to you. You must talk with your health care provider for complete information about your health and treatment options. This information should not be used to decide whether or not to accept your health care providers advice, instructions or recommendations. Only your health care provider has the knowledge and training to provide advice that is right for you.  Copyright   Copyright © 2021 UpToDate, Inc. and its affiliates and/or licensors. All rights reserved.    Patient Education       Exercise and Aging   General   Exercise can help older adults prevent falls and stay independent longer. Exercise may also help:  You have stronger muscles and bones and better balance  You lose weight and have more energy  Make your heart and lungs stronger  Lower your chance of health problems like heart disease, high blood sugar, or breast or colon cancer  Control conditions like high blood pressure and diabetes  You manage stress and get better sleep  Your mood, self-esteem, and  self-image  How you think, plan, and pay attention  There are four types of exercise: endurance, strength, balance, and flexibility.  Endurance exercises get your heart rate up and keep it up for a while. Most people should get at least 30 minutes of exercise that gets your heart rate up on 5 or more days each week. Walking, swimming, biking, or going up and down stairs are kinds of exercises to get your heart rate up. You do not have to do all 30 minutes at one time. Try doing 10 minutes 3 times a day.  Strength exercises build muscle. Lifting weights or doing knee bends are kinds of strength exercises.  Balance exercises help to prevent falls. Raise up on your toes or stand on one leg as a kind of balance exercise.  Flexibility exercises move your joints through a full range of motion and stretch your muscles. Bend forward in a chair to stretch your back, do stretches, or bend and extend your arms or legs as a kind of flexibility exercise. Try doing 10 minutes twice a week.  Plan to include all these exercise types in your exercise program. Always check with your doctor before you start a new exercise program.  Some people do not like formal exercise classes, but there are many ways to work your muscles each day. Here are some things you can do.  Use steps instead of elevators.  Park far away in parking lots to walk farther.  Use the time while you wait. Do knee bends as you hold onto the kitchen counter while you wait for your coffee to brew.  When you unload groceries, lift the bags or a container of milk a few times to exercise your arms and legs.  Walk the long way when you go somewhere.  After you walk to the bathroom, walk a few laps around the house before sitting down.  Try doing different standing exercises after you wash your hands each time in the bathroom.  Do balance exercises while you brush your teeth.  Do an exercise or get up and walk during TV commercials.  Don't forget to exercise small muscle  groups like your hands, fingers, ankles, toes, and neck. Wiggle, bend, flex, and rotate these joints from left to right and back to front to help to keep these joints flexible.  When do I need to call the doctor?   Stop exercising and talk to your doctor if you have any of these problems:  Dizziness  Shortness of breath  Pain or pressure in the chest, arms, throat, jaw, or back  Nausea or vomiting  Blood clots  Infection  Joint swelling  Open wounds  Recent hip, back, or eye surgery  New problems that start during exercise  Helpful tips   If you have a health problem like heart disease or diabetes, talk with your doctor about the best exercises for you.  Always warm up before you stretch. Heated muscles stretch much easier than cool muscles. Try to walk or bike at an easy pace for a few minutes to warm up your muscles.  Slow your pace again after you exercise to cool down and bring your heart rate down slowly.  Be sure you do not hold your breath when you exercise because it can raise your blood pressure. If you tend to hold your breath, try to count out loud when you exercise.  Never bounce when doing stretches. Use slow and steady motions and hold your stretch for 20 to 30 seconds.  Have a routine. Doing exercises before a meal may be a good way to get into a routine.  Set small goals for yourself when you start to exercise. Use a chart to see how much you are doing. Ask someone to exercise with you.  Exercise may be slightly uncomfortable, but you should not have sharp pains. If you do get sharp pains, stop what you are doing. If the sharp pains continue, call your doctor.  Drink plenty of fluids without caffeine when you exercise and afterwards. Avoid outdoor exercise if it is too cold or too hot.  Wear the right clothes and shoes. Try layers of clothes, so that you can take them off if you get too hot. Shoes should fit well and support your feet.  Where can I learn more?   National [place] on  Aging  https://www.dayana.nih.gov/health/publication/exercise-physical-activity/introduction   Last Reviewed Date   2021-03-18  Consumer Information Use and Disclaimer   This information is not specific medical advice and does not replace information you receive from your health care provider. This is only a brief summary of general information. It does NOT include all information about conditions, illnesses, injuries, tests, procedures, treatments, therapies, discharge instructions or life-style choices that may apply to you. You must talk with your health care provider for complete information about your health and treatment options. This information should not be used to decide whether or not to accept your health care providers advice, instructions or recommendations. Only your health care provider has the knowledge and training to provide advice that is right for you.  Copyright   Copyright © 2021 UpToDate, Inc. and its affiliates and/or licensors. All rights reserved.

## 2025-02-11 NOTE — ASSESSMENT & PLAN NOTE
Well controlled.   Continue Losartan 25 mg p.o. daily and carvedilol 12.5 mg p.o. b.i.d. as well as isosorbide 30 mg p.o. daily  Low Sodium Diet (DASH Diet - Less than 2 grams of sodium per day).  Monitor blood pressure daily and log. Report consistent numbers greater than 140/90.  Maintain healthy weight with goal BMI <30. Exercise 30 minutes per day, 5 days per week.

## 2025-02-12 ENCOUNTER — TELEPHONE (OUTPATIENT)
Dept: INTERNAL MEDICINE | Facility: CLINIC | Age: 80
End: 2025-02-12
Payer: MEDICARE

## 2025-02-12 LAB
LEFT EYE DM RETINOPATHY: NEGATIVE
RIGHT EYE DM RETINOPATHY: NEGATIVE

## 2025-02-12 RX ORDER — BLOOD-GLUCOSE SENSOR
1 EACH MISCELLANEOUS DAILY
Qty: 1 EACH | Refills: 0 | Status: SHIPPED | OUTPATIENT
Start: 2025-02-12 | End: 2026-02-12

## 2025-02-12 NOTE — TELEPHONE ENCOUNTER
----- Message from Mia sent at 2/12/2025 10:15 AM CST -----  Regarding: advice  Who Called: Eve Altman    Caller is requesting assistance/information from provider's office.  Symptoms (please be specific):    How long has patient had these symptoms:    List of preferred pharmacies on file (remove unneeded): [unfilled]  If different, enter pharmacy into here including location and phone number:           Preferred Method of Contact: Phone Call  Patient's Preferred Phone Number on File: [phone]   Best Call Back Number, if different:    Additional Information: stated that she is needing to speak to nurse about an update on glucose meter being sent to the pharmacy. Requested a call back . Please advise   No

## 2025-02-12 NOTE — TELEPHONE ENCOUNTER
Spoke with patient and let her know information for monitor was sent to [facility] for her. Will keep her posted

## 2025-02-17 ENCOUNTER — PATIENT OUTREACH (OUTPATIENT)
Facility: CLINIC | Age: 80
End: 2025-02-17
Payer: MEDICARE

## 2025-02-17 NOTE — PROGRESS NOTES
Health Maintenance Topic(s) Outreach Outcomes & Actions Taken:    Eye Exam - Outreach Outcomes & Actions Taken  : Diabetic Eye External Records Uploaded, Care Team & History Updated if Applicable       Additional Notes:  DM Eye Exam 2/12/25

## 2025-02-27 ENCOUNTER — RESULTS FOLLOW-UP (OUTPATIENT)
Dept: INTERNAL MEDICINE | Facility: CLINIC | Age: 80
End: 2025-02-27
Payer: MEDICARE

## 2025-02-27 ENCOUNTER — HOSPITAL ENCOUNTER (OUTPATIENT)
Dept: RADIOLOGY | Facility: HOSPITAL | Age: 80
Discharge: HOME | End: 2025-02-27
Attending: INTERNAL MEDICINE
Payer: MEDICARE

## 2025-02-27 DIAGNOSIS — Z78.0 POST-MENOPAUSAL: ICD-10-CM

## 2025-02-27 DIAGNOSIS — Z12.31 ENCOUNTER FOR SCREENING MAMMOGRAM FOR BREAST CANCER: ICD-10-CM

## 2025-02-27 PROCEDURE — 77067 SCR MAMMO BI INCL CAD: CPT | Mod: TC

## 2025-02-27 PROCEDURE — 77080 DXA BONE DENSITY AXIAL: CPT | Mod: TC

## 2025-02-27 NOTE — PROGRESS NOTES
Please inform patient I have reviewed her bone density, which indicates osteopenia (the precursor to osteoporosis.... She does not have osteoporosis).  Recommendations are currently to incorporate, if she is not already on, over the counter supplementation with calcium 500 mg to 600 mg twice daily and vitamin D 800 units twice daily.  As well as incorporate some form of routine weightbearing exercise such as walking and cycling to stimulate bone health.

## 2025-02-28 DIAGNOSIS — E11.9 TYPE 2 DIABETES MELLITUS WITHOUT COMPLICATION, UNSPECIFIED WHETHER LONG TERM INSULIN USE: Chronic | ICD-10-CM

## 2025-02-28 RX ORDER — TIRZEPATIDE 10 MG/.5ML
10 INJECTION, SOLUTION SUBCUTANEOUS
Qty: 2 ML | Refills: 2 | Status: SHIPPED | OUTPATIENT
Start: 2025-02-28

## 2025-02-28 NOTE — TELEPHONE ENCOUNTER
----- Message from TORRI Cotter sent at 2/27/2025  4:08 PM CST -----  Please inform patient I have reviewed her bone density, which indicates osteopenia (the precursor to osteoporosis.... She does not have osteoporosis).  Recommendations are currently to incorporate,   if she is not already on, over the counter supplementation with calcium 500 mg to 600 mg twice daily and vitamin D 800 units twice daily.  As well as incorporate some form of routine weightbearing   exercise such as walking and cycling to stimulate bone health.    ----- Message -----  From: Interface, Rad Results In  Sent: 2/27/2025   3:22 PM CST  To: Charity Jerome MD

## 2025-03-10 ENCOUNTER — RESULTS FOLLOW-UP (OUTPATIENT)
Dept: INTERNAL MEDICINE | Facility: CLINIC | Age: 80
End: 2025-03-10

## 2025-03-13 ENCOUNTER — LAB VISIT (OUTPATIENT)
Dept: LAB | Facility: HOSPITAL | Age: 80
End: 2025-03-13
Attending: INTERNAL MEDICINE
Payer: MEDICARE

## 2025-03-13 DIAGNOSIS — E11.69 DIABETES MELLITUS ASSOCIATED WITH HORMONAL ETIOLOGY: Primary | ICD-10-CM

## 2025-03-13 DIAGNOSIS — I10 ESSENTIAL HYPERTENSION, MALIGNANT: ICD-10-CM

## 2025-03-13 DIAGNOSIS — E78.00 PURE HYPERCHOLESTEROLEMIA: ICD-10-CM

## 2025-03-13 DIAGNOSIS — E78.5 HYPERLIPIDEMIA, UNSPECIFIED HYPERLIPIDEMIA TYPE: ICD-10-CM

## 2025-03-13 LAB
ALBUMIN SERPL-MCNC: 4 G/DL (ref 3.4–4.8)
ALP SERPL-CCNC: 99 UNIT/L (ref 40–150)
ALT SERPL-CCNC: 10 UNIT/L (ref 0–55)
AST SERPL-CCNC: 13 UNIT/L (ref 5–34)
BILIRUB DIRECT SERPL-MCNC: 0.2 MG/DL (ref 0–?)
BILIRUB INDIRECT SERPL-MCNC: 0.5 MG/DL (ref 0–0.8)
BILIRUB SERPL-MCNC: 0.7 MG/DL
CHOLEST SERPL-MCNC: 204 MG/DL
CHOLEST/HDLC SERPL: 4 {RATIO} (ref 0–5)
HDLC SERPL-MCNC: 50 MG/DL (ref 35–60)
LDLC SERPL CALC-MCNC: 116 MG/DL (ref 50–140)
PROT SERPL-MCNC: 6.8 GM/DL (ref 5.8–7.6)
TRIGL SERPL-MCNC: 190 MG/DL (ref 37–140)
VLDLC SERPL CALC-MCNC: 38 MG/DL

## 2025-03-13 PROCEDURE — 80076 HEPATIC FUNCTION PANEL: CPT

## 2025-03-13 PROCEDURE — 36415 COLL VENOUS BLD VENIPUNCTURE: CPT

## 2025-03-13 PROCEDURE — 80061 LIPID PANEL: CPT

## 2025-03-18 ENCOUNTER — TELEPHONE (OUTPATIENT)
Dept: INTERNAL MEDICINE | Facility: CLINIC | Age: 80
End: 2025-03-18
Payer: MEDICARE

## 2025-03-18 DIAGNOSIS — N18.31 CHRONIC KIDNEY DISEASE, STAGE 3A: Primary | ICD-10-CM

## 2025-03-18 DIAGNOSIS — E11.9 TYPE 2 DIABETES MELLITUS WITHOUT COMPLICATION, WITHOUT LONG-TERM CURRENT USE OF INSULIN: Chronic | ICD-10-CM

## 2025-03-18 DIAGNOSIS — I25.10 CORONARY ARTERY DISEASE INVOLVING NATIVE CORONARY ARTERY OF NATIVE HEART WITHOUT ANGINA PECTORIS: ICD-10-CM

## 2025-03-18 DIAGNOSIS — Z86.39 HISTORY OF HYPOGLYCEMIA: ICD-10-CM

## 2025-03-18 RX ORDER — BLOOD-GLUCOSE SENSOR
1 EACH MISCELLANEOUS
Qty: 6 EACH | Refills: 3 | Status: SHIPPED | OUTPATIENT
Start: 2025-03-18 | End: 2025-03-18

## 2025-03-18 RX ORDER — BLOOD-GLUCOSE SENSOR
1 EACH MISCELLANEOUS
Qty: 6 EACH | Refills: 3 | Status: SHIPPED | OUTPATIENT
Start: 2025-03-18 | End: 2026-03-18

## 2025-03-18 NOTE — TELEPHONE ENCOUNTER
----- Message from Nichole sent at 3/18/2025  3:18 PM CDT -----  .Who Called: Eve Garcia is requesting assistance/information from provider's office.Symptoms (please be specific): na How long has patient had these symptoms:  naList of preferred pharmacies on file (remove unneeded): [unfilled]If different, enter pharmacy into here including location and phone number: naPreferred Method of Contact: Phone CallPatient's Preferred Phone Number on File: [phone] Best Call Back Number, if different:Additional Information:pt called wanted nurse  to call regarding monitor-glucose

## 2025-03-27 ENCOUNTER — TELEPHONE (OUTPATIENT)
Dept: INTERNAL MEDICINE | Facility: CLINIC | Age: 80
End: 2025-03-27
Payer: MEDICARE

## 2025-03-27 NOTE — TELEPHONE ENCOUNTER
Pt med insurance information  [employer] medic[employer] advantage   Member # [id number]  Group # [id number]  RXBIN [id number] RXPCN [id number] Rxgroup [id number]

## 2025-03-27 NOTE — TELEPHONE ENCOUNTER
Kandy did Pt PA with all the correct information. Spoke with Pt, got card information from her insurance card, and PA was redone. Will inform Pt once we have a response.

## 2025-03-27 NOTE — TELEPHONE ENCOUNTER
Copied from CRM #[id number]. Topic: General Inquiry - Information Request  >> Mar 27, 2025  9:52 AM Awa wrote:  Who Called: Eve Altman    Caller is requesting assistance/information from provider's office.    Symptoms (please be specific):    How long has patient had these symptoms:    List of preferred pharmacies on file (remove unneeded): [unfilled]  If different, enter pharmacy into here including location and phone number:     Patient's Preferred Phone Number on File: [phone]   Best Call Back Number, if different:  Additional Information:  pt called to speak to nurse about Brian sensor ,  stated authorization is needed before pt can , pt stated she has been waiting for over a week

## 2025-03-31 ENCOUNTER — TELEPHONE (OUTPATIENT)
Dept: INTERNAL MEDICINE | Facility: CLINIC | Age: 80
End: 2025-03-31
Payer: MEDICARE

## 2025-03-31 NOTE — TELEPHONE ENCOUNTER
Pt Freestyle was denied Pt insurance will not pay for the prescription because Pt is not on insulin. Spoke with Pt, information given, pt expressed understanding. Pt stated she will try and order her a Stelo by [employer].

## 2025-03-31 NOTE — TELEPHONE ENCOUNTER
Copied from CRM #[id number]. Topic: General Inquiry - Return Call  >> Mar 31, 2025  1:50 PM Lucy wrote:  .Type:  Patient Returning Call    Who Called:pt  Who Left Message for Patient:pt  Does the patient know what this is regarding?:call back  Would the patient rather a call back or a response via [facility]?   Best Call Back Number:[phone]  Additional Information: Please have Celia call back no additional information was given.

## 2025-05-19 DIAGNOSIS — F03.90 DEMENTIA, UNSPECIFIED DEMENTIA SEVERITY, UNSPECIFIED DEMENTIA TYPE, UNSPECIFIED WHETHER BEHAVIORAL, PSYCHOTIC, OR MOOD DISTURBANCE OR ANXIETY: Primary | ICD-10-CM

## 2025-05-19 RX ORDER — MEMANTINE HYDROCHLORIDE 5 MG/1
5 TABLET ORAL 2 TIMES DAILY
Qty: 60 TABLET | Refills: 12 | Status: SHIPPED | OUTPATIENT
Start: 2025-05-19

## 2025-06-04 DIAGNOSIS — I10 ESSENTIAL (PRIMARY) HYPERTENSION: ICD-10-CM

## 2025-06-04 DIAGNOSIS — N18.31 CHRONIC KIDNEY DISEASE, STAGE 3A: ICD-10-CM

## 2025-06-04 DIAGNOSIS — E11.9 TYPE 2 DIABETES MELLITUS WITHOUT COMPLICATION, WITHOUT LONG-TERM CURRENT USE OF INSULIN: Primary | ICD-10-CM

## 2025-06-05 ENCOUNTER — TELEPHONE (OUTPATIENT)
Dept: INTERNAL MEDICINE | Facility: CLINIC | Age: 80
End: 2025-06-05
Payer: MEDICARE

## 2025-06-09 ENCOUNTER — LAB VISIT (OUTPATIENT)
Dept: LAB | Facility: HOSPITAL | Age: 80
End: 2025-06-09
Attending: INTERNAL MEDICINE
Payer: MEDICARE

## 2025-06-09 DIAGNOSIS — N18.31 CHRONIC KIDNEY DISEASE, STAGE 3A: ICD-10-CM

## 2025-06-09 DIAGNOSIS — I10 ESSENTIAL (PRIMARY) HYPERTENSION: ICD-10-CM

## 2025-06-09 DIAGNOSIS — E11.9 TYPE 2 DIABETES MELLITUS WITHOUT COMPLICATION, WITHOUT LONG-TERM CURRENT USE OF INSULIN: ICD-10-CM

## 2025-06-09 LAB
ALBUMIN SERPL-MCNC: 3.6 G/DL (ref 3.4–4.8)
ALBUMIN/GLOB SERPL: 1.2 RATIO (ref 1.1–2)
ALP SERPL-CCNC: 61 UNIT/L (ref 40–150)
ALT SERPL-CCNC: 11 UNIT/L (ref 0–55)
ANION GAP SERPL CALC-SCNC: 8 MEQ/L
AST SERPL-CCNC: 14 UNIT/L (ref 11–45)
BASOPHILS # BLD AUTO: 0.02 X10(3)/MCL
BASOPHILS NFR BLD AUTO: 0.3 %
BILIRUB SERPL-MCNC: 0.4 MG/DL
BUN SERPL-MCNC: 14.8 MG/DL (ref 9.8–20.1)
CALCIUM SERPL-MCNC: 9.1 MG/DL (ref 8.4–10.2)
CHLORIDE SERPL-SCNC: 106 MMOL/L (ref 98–107)
CO2 SERPL-SCNC: 26 MMOL/L (ref 23–31)
CREAT SERPL-MCNC: 0.99 MG/DL (ref 0.55–1.02)
CREAT/UREA NIT SERPL: 15
EOSINOPHIL # BLD AUTO: 0.09 X10(3)/MCL (ref 0–0.9)
EOSINOPHIL NFR BLD AUTO: 1.6 %
ERYTHROCYTE [DISTWIDTH] IN BLOOD BY AUTOMATED COUNT: 15.9 % (ref 11.5–17)
EST. AVERAGE GLUCOSE BLD GHB EST-MCNC: 131.2 MG/DL
GFR SERPLBLD CREATININE-BSD FMLA CKD-EPI: 57 ML/MIN/1.73/M2
GLOBULIN SER-MCNC: 2.9 GM/DL (ref 2.4–3.5)
GLUCOSE SERPL-MCNC: 113 MG/DL (ref 82–115)
HBA1C MFR BLD: 6.2 %
HCT VFR BLD AUTO: 36.5 % (ref 37–47)
HGB BLD-MCNC: 11.5 G/DL (ref 12–16)
IMM GRANULOCYTES # BLD AUTO: 0.02 X10(3)/MCL (ref 0–0.04)
IMM GRANULOCYTES NFR BLD AUTO: 0.3 %
LYMPHOCYTES # BLD AUTO: 1.2 X10(3)/MCL (ref 0.6–4.6)
LYMPHOCYTES NFR BLD AUTO: 21 %
MCH RBC QN AUTO: 24.8 PG (ref 27–31)
MCHC RBC AUTO-ENTMCNC: 31.5 G/DL (ref 33–36)
MCV RBC AUTO: 78.8 FL (ref 80–94)
MONOCYTES # BLD AUTO: 0.46 X10(3)/MCL (ref 0.1–1.3)
MONOCYTES NFR BLD AUTO: 8 %
NEUTROPHILS # BLD AUTO: 3.93 X10(3)/MCL (ref 2.1–9.2)
NEUTROPHILS NFR BLD AUTO: 68.8 %
NRBC BLD AUTO-RTO: 0 %
PLATELET # BLD AUTO: 248 X10(3)/MCL (ref 130–400)
PMV BLD AUTO: 9.9 FL (ref 7.4–10.4)
POTASSIUM SERPL-SCNC: 3.9 MMOL/L (ref 3.5–5.1)
PROT SERPL-MCNC: 6.5 GM/DL (ref 5.8–7.6)
RBC # BLD AUTO: 4.63 X10(6)/MCL (ref 4.2–5.4)
SODIUM SERPL-SCNC: 140 MMOL/L (ref 136–145)
WBC # BLD AUTO: 5.72 X10(3)/MCL (ref 4.5–11.5)

## 2025-06-09 PROCEDURE — 83036 HEMOGLOBIN GLYCOSYLATED A1C: CPT

## 2025-06-09 PROCEDURE — 80053 COMPREHEN METABOLIC PANEL: CPT

## 2025-06-09 PROCEDURE — 85025 COMPLETE CBC W/AUTO DIFF WBC: CPT

## 2025-06-09 PROCEDURE — 36415 COLL VENOUS BLD VENIPUNCTURE: CPT

## 2025-06-11 NOTE — PROGRESS NOTES
Subjective:      Patient ID: Eve Altman is a 80 y.o. female.    Chief Complaint: Follow-up (4 months diabetes/)    Ms. Prado is a 79-year-old female with hypertension, hyperlipidemia, CAD status post PTCA to LAD and obtuse marginal, diabetes mellitus type 2, interstitial cystitis that is here today for her Medicare wellness visit.  On Mounjaro and seems to be doing well.  At last visit discontinue glyburide and metformin.    HGB A1c noted to 6.2,  did go down from 6.7 at last visit On Mounjaro to 10 mg subQ weekly     MCW:  02/11/2025       The patient's Health Maintenance was reviewed and the following appears to be due at this time:   Health Maintenance Due   Topic Date Due    TETANUS VACCINE  Never done    Shingles Vaccine (1 of 2) Never done    RSV Vaccine (Age 60+ and Pregnant patients) (1 - 1-dose 75+ series) Never done    COVID-19 Vaccine (3 - 2024-25 season) 09/01/2024        Past Medical History:  Past Medical History:   Diagnosis Date    Chronic GERD     Coronary artery disease     Essential (primary) hypertension     H/O heart artery stent     HLD (hyperlipidemia)     Hypothyroidism, unspecified     IC (interstitial cystitis)     Osteoarthritis of right knee     Osteopenia     Stress incontinence (female) (male)     Type 2 diabetes mellitus without complications      Past Surgical History:   Procedure Laterality Date    Bone and/or joint imaging; whole body   06/24/2019    BUNIONECTOMY      CATARACT EXTRACTION W/  INTRAOCULAR LENS IMPLANT Bilateral     CHOLECYSTECTOMY      COLONOSCOPY  03/11/2013    CORONARY ANGIOPLASTY WITH STENT PLACEMENT      x 3    ESOPHAGEAL DILATION      ESOPHAGOGASTRODUODENOSCOPY  04/16/2019    ESOPHAGOGASTRODUODENOSCOPY  03/04/2013    EYE SURGERY      HYSTERECTOMY      LUMBAR SPINE SURGERY  07/24/2019    OPEN REDUCTION AND INTERNAL FIXATION (ORIF) OF INJURY OF SHOULDER Right     ROBOTIC ARTHROPLASTY, KNEE Right 04/17/2023    Procedure: ROBOTIC ARTHROPLASTY, KNEE, TOTAL;   "Surgeon: Yung Allen MD;  [facility];  Service: Orthopedics;  Laterality: Right;    SPINE SURGERY  July 2019    Lower back    Suspension of bladder      x2    TUBAL LIGATION  1975     Review of patient's allergies indicates:   Allergen Reactions    Adhesive Dermatitis    Niacin Shortness Of Breath     Other reaction(s): UNKNOWN  Redness and SOB  Other reaction(s): Not available    Lisinopril Other (See Comments)    Latex Other (See Comments)     Other reaction(s): UNKNOWN    Statins-hmg-coa reductase inhibitors Other (See Comments)     Social History[1]  Family History   Problem Relation Name Age of Onset    Heart attack Mother Kortney     Heart disease Mother Kortney     Diabetes Father Juan Carlos     Cancer Sister Mari     Idiopathic pulmonary fibrosis Brother      Cancer Brother Juan Carlos carver         I P F    Cancer Brother Juan Carlos carver         I P F       Review of Systems    A comprehensive review of systems was performed and is negative except for that stated above  Objective:   /62 (BP Location: Left arm, Patient Position: Sitting)   Pulse 89   Ht 5' 1" (1.549 m)   Wt 73.9 kg (163 lb)   SpO2 96%   BMI 30.80 kg/m²     Physical Exam  Constitutional:       Appearance: Normal appearance.   HENT:      Head: Normocephalic and atraumatic.      Nose: Nose normal.      Mouth/Throat:      Mouth: Mucous membranes are moist.      Pharynx: Oropharynx is clear.   Eyes:      Extraocular Movements: Extraocular movements intact.      Pupils: Pupils are equal, round, and reactive to light.   Cardiovascular:      Rate and Rhythm: Normal rate and regular rhythm.      Pulses: Normal pulses.   Pulmonary:      Effort: Pulmonary effort is normal.      Breath sounds: Normal breath sounds.   Abdominal:      General: Bowel sounds are normal.      Palpations: Abdomen is soft.   Musculoskeletal:         General: Normal range of motion.      Cervical back: Normal range of motion and neck supple.   Skin:     General: Skin is warm. "   Neurological:      General: No focal deficit present.      Mental Status: She is alert and oriented to person, place, and time. Mental status is at baseline.   Psychiatric:         Mood and Affect: Mood normal.       Assessment/ Plan:   1. Mixed hyperlipidemia  Assessment & Plan:  Initiating patient on rosuvastatin, hopefully she will tolerated this time, educated on cardioprotective and cerebrovascular protective properties  Stressed importance of dietary modifications. Follow a low cholesterol, low saturated fat diet with less that 200mg of cholesterol a day.  Avoid fried foods and high saturated fats (high saturated fats less than 7% of calories).  Add Flax Seed/Fish Oil supplements to diet. Increase dietary fiber.  Regular exercise can reduce LDL and raise HDL. Stressed importance of physical activity 5 times per week for 30 minutes per day.        2. Essential (primary) hypertension  Assessment & Plan:  Well controlled.   Continue Losartan 25 mg p.o. daily and carvedilol 12.5 mg p.o. b.i.d. as well as isosorbide 30 mg p.o. daily  Low Sodium Diet (DASH Diet - Less than 2 grams of sodium per day).  Monitor blood pressure daily and log. Report consistent numbers greater than 140/90.  Maintain healthy weight with goal BMI <30. Exercise 30 minutes per day, 5 days per week.         3. Type 2 diabetes mellitus without complication, without long-term current use of insulin  Assessment & Plan:  -patient advised on the importance of avoiding excessive carbohydrates and sugary food intake and having some form of routine aerobic exercise on a regular basis.   -discontinue glyburide metformin  -Continue Mounjaro to 10 mg subQ weekly         4. Dementia, unspecified dementia severity, unspecified dementia type, unspecified whether behavioral, psychotic, or mood disturbance or anxiety  Assessment & Plan:  -on memantine 5 mg p.o. b.i.d., continue         Other orders  -     rosuvastatin (CRESTOR) 20 MG tablet; Take 1 tablet  (20 mg total) by mouth once daily.  Dispense: 30 tablet; Refill: 3              [1]   Social History  Socioeconomic History    Marital status:    Tobacco Use    Smoking status: Never     Passive exposure: Never    Smokeless tobacco: Never   Substance and Sexual Activity    Alcohol use: Not Currently    Drug use: Never    Sexual activity: Yes     Partners: Female     Social Drivers of Health     Financial Resource Strain: Low Risk  (6/5/2025)    Overall Financial Resource Strain (CARDIA)     Difficulty of Paying Living Expenses: Not hard at all   Food Insecurity: No Food Insecurity (6/5/2025)    Hunger Vital Sign     Worried About Running Out of Food in the Last Year: Never true     Ran Out of Food in the Last Year: Never true   Transportation Needs: No Transportation Needs (6/5/2025)    PRAPARE - Transportation     Lack of Transportation (Medical): No     Lack of Transportation (Non-Medical): No   Physical Activity: Insufficiently Active (6/5/2025)    Exercise Vital Sign     Days of Exercise per Week: 2 days     Minutes of Exercise per Session: 30 min   Stress: No Stress Concern Present (6/5/2025)    [employer] - Occupational Stress Questionnaire     Feeling of Stress : Not at all   Housing Stability: Low Risk  (6/5/2025)    Housing Stability Vital Sign     Unable to Pay for Housing in the Last Year: No     Number of Times Moved in the Last Year: 0     Homeless in the Last Year: No

## 2025-06-12 ENCOUNTER — OFFICE VISIT (OUTPATIENT)
Dept: INTERNAL MEDICINE | Facility: CLINIC | Age: 80
End: 2025-06-12
Payer: MEDICARE

## 2025-06-12 VITALS
HEIGHT: 61 IN | WEIGHT: 163 LBS | HEART RATE: 89 BPM | DIASTOLIC BLOOD PRESSURE: 62 MMHG | BODY MASS INDEX: 30.78 KG/M2 | SYSTOLIC BLOOD PRESSURE: 122 MMHG | OXYGEN SATURATION: 96 %

## 2025-06-12 DIAGNOSIS — F03.90 DEMENTIA, UNSPECIFIED DEMENTIA SEVERITY, UNSPECIFIED DEMENTIA TYPE, UNSPECIFIED WHETHER BEHAVIORAL, PSYCHOTIC, OR MOOD DISTURBANCE OR ANXIETY: ICD-10-CM

## 2025-06-12 DIAGNOSIS — E78.2 MIXED HYPERLIPIDEMIA: Primary | Chronic | ICD-10-CM

## 2025-06-12 DIAGNOSIS — I10 ESSENTIAL (PRIMARY) HYPERTENSION: Chronic | ICD-10-CM

## 2025-06-12 DIAGNOSIS — E11.9 TYPE 2 DIABETES MELLITUS WITHOUT COMPLICATION, WITHOUT LONG-TERM CURRENT USE OF INSULIN: Chronic | ICD-10-CM

## 2025-06-12 PROCEDURE — 1159F MED LIST DOCD IN RCRD: CPT | Mod: CPTII,,, | Performed by: INTERNAL MEDICINE

## 2025-06-12 PROCEDURE — 1160F RVW MEDS BY RX/DR IN RCRD: CPT | Mod: CPTII,,, | Performed by: INTERNAL MEDICINE

## 2025-06-12 PROCEDURE — 99214 OFFICE O/P EST MOD 30 MIN: CPT | Mod: ,,, | Performed by: INTERNAL MEDICINE

## 2025-06-12 PROCEDURE — 3074F SYST BP LT 130 MM HG: CPT | Mod: CPTII,,, | Performed by: INTERNAL MEDICINE

## 2025-06-12 PROCEDURE — 2023F DILAT RTA XM W/O RTNOPTHY: CPT | Mod: CPTII,,, | Performed by: INTERNAL MEDICINE

## 2025-06-12 PROCEDURE — 3078F DIAST BP <80 MM HG: CPT | Mod: CPTII,,, | Performed by: INTERNAL MEDICINE

## 2025-06-12 PROCEDURE — 1101F PT FALLS ASSESS-DOCD LE1/YR: CPT | Mod: CPTII,,, | Performed by: INTERNAL MEDICINE

## 2025-06-12 PROCEDURE — 3288F FALL RISK ASSESSMENT DOCD: CPT | Mod: CPTII,,, | Performed by: INTERNAL MEDICINE

## 2025-06-12 PROCEDURE — 1126F AMNT PAIN NOTED NONE PRSNT: CPT | Mod: CPTII,,, | Performed by: INTERNAL MEDICINE

## 2025-06-12 RX ORDER — ROSUVASTATIN CALCIUM 20 MG/1
20 TABLET, COATED ORAL DAILY
Qty: 30 TABLET | Refills: 3 | Status: SHIPPED | OUTPATIENT
Start: 2025-06-12 | End: 2026-06-12

## 2025-06-12 RX ORDER — RANOLAZINE 500 MG/1
500 TABLET, EXTENDED RELEASE ORAL 2 TIMES DAILY
COMMUNITY

## 2025-06-12 RX ORDER — BUTALB/ACETAMINOPHEN/CAFFEINE 50-325-40
1 TABLET ORAL 2 TIMES DAILY
COMMUNITY

## 2025-06-12 RX ORDER — MULTIVITAMIN
1 TABLET ORAL DAILY
COMMUNITY

## 2025-06-12 NOTE — ASSESSMENT & PLAN NOTE
-patient advised on the importance of avoiding excessive carbohydrates and sugary food intake and having some form of routine aerobic exercise on a regular basis.   -discontinue glyburide metformin  -Continue Mounjaro to 10 mg subQ weekly

## 2025-06-12 NOTE — ASSESSMENT & PLAN NOTE
Initiating patient on rosuvastatin, hopefully she will tolerated this time, educated on cardioprotective and cerebrovascular protective properties  Stressed importance of dietary modifications. Follow a low cholesterol, low saturated fat diet with less that 200mg of cholesterol a day.  Avoid fried foods and high saturated fats (high saturated fats less than 7% of calories).  Add Flax Seed/Fish Oil supplements to diet. Increase dietary fiber.  Regular exercise can reduce LDL and raise HDL. Stressed importance of physical activity 5 times per week for 30 minutes per day.

## 2025-06-14 DIAGNOSIS — E11.9 TYPE 2 DIABETES MELLITUS WITHOUT COMPLICATION, UNSPECIFIED WHETHER LONG TERM INSULIN USE: Chronic | ICD-10-CM

## 2025-06-16 RX ORDER — TIRZEPATIDE 10 MG/.5ML
10 INJECTION, SOLUTION SUBCUTANEOUS
Qty: 2 ML | Refills: 2 | Status: SHIPPED | OUTPATIENT
Start: 2025-06-16

## 2025-07-31 DIAGNOSIS — T46.6X5A MYALGIA DUE TO STATIN: ICD-10-CM

## 2025-07-31 DIAGNOSIS — M79.10 MYALGIA DUE TO STATIN: ICD-10-CM

## 2025-07-31 RX ORDER — ROPINIROLE 1 MG/1
2 TABLET, FILM COATED ORAL 3 TIMES DAILY
Qty: 90 TABLET | Refills: 3 | Status: SHIPPED | OUTPATIENT
Start: 2025-07-31

## 2025-08-25 DIAGNOSIS — E11.9 TYPE 2 DIABETES MELLITUS WITHOUT COMPLICATION, WITHOUT LONG-TERM CURRENT USE OF INSULIN: Chronic | ICD-10-CM

## 2025-08-25 RX ORDER — BLOOD-GLUCOSE METER
EACH MISCELLANEOUS
Qty: 200 STRIP | Refills: 6 | Status: SHIPPED | OUTPATIENT
Start: 2025-08-25

## 2025-09-05 DIAGNOSIS — E11.9 TYPE 2 DIABETES MELLITUS WITHOUT COMPLICATION, UNSPECIFIED WHETHER LONG TERM INSULIN USE: Chronic | ICD-10-CM

## 2025-09-05 RX ORDER — TIRZEPATIDE 10 MG/.5ML
10 INJECTION, SOLUTION SUBCUTANEOUS
Qty: 2 ML | Refills: 2 | Status: SHIPPED | OUTPATIENT
Start: 2025-09-05

## (undated) DEVICE — APPLICATOR CHLORAPREP ORN 26ML

## (undated) DEVICE — ELECTRODE PATIENT RETURN DISP

## (undated) DEVICE — CUSHION  WC FOAM 20X20X.75IN

## (undated) DEVICE — BLADE MAKO STANDARD

## (undated) DEVICE — GLOVE PROTEXIS HYDROGEL SZ8.5

## (undated) DEVICE — KIT TRIATHLON CR FEM PREP SZ3

## (undated) DEVICE — DEVICE STRATAFIX SYMMETRIC +

## (undated) DEVICE — SOL NACL IRR 1000ML BTL

## (undated) DEVICE — DRAPE MEDIUM SHEET 40X70IN

## (undated) DEVICE — DRESSING XEROFORM 1X8IN

## (undated) DEVICE — KIT SURGICAL TURNOVER

## (undated) DEVICE — KIT VIZADISC KNEE TRACKING

## (undated) DEVICE — PAD ABD 8X10 STERILE

## (undated) DEVICE — SUT VICRYL BR 1 GEN 27 CT-1

## (undated) DEVICE — TAPE SILK 3IN

## (undated) DEVICE — GOWN POLY REINF X-LONG 2XL

## (undated) DEVICE — DRESSING XEROFORM FOIL PK 1X8

## (undated) DEVICE — SUT MONOCRYL 3-0 PS-2 UND

## (undated) DEVICE — DRAPE FULL SHEET 70X100IN

## (undated) DEVICE — KIT DRAPE RIO ONE PIECE W/POCK

## (undated) DEVICE — SOL POVIDONE IODINE PCH 3/4OZ

## (undated) DEVICE — COVER TABLE HVY DTY 60X90IN

## (undated) DEVICE — BLADE SAG DUAL CUT 18X90X1.35

## (undated) DEVICE — Device

## (undated) DEVICE — PIN BONE 3.2X110MM
Type: IMPLANTABLE DEVICE | Site: KNEE | Status: NON-FUNCTIONAL
Removed: 2023-04-17

## (undated) DEVICE — GLOVE PROTEXIS HYDROGEL SZ6.5

## (undated) DEVICE — DRAPE STERI U-SHAPED 47X51IN

## (undated) DEVICE — CUFF ATS 2 PORT SNGL BLDR 34IN

## (undated) DEVICE — KIT CHECKPOINT MAKO

## (undated) DEVICE — PAD CAST SPECIALIST STRL 6

## (undated) DEVICE — CORD SILICONE RETRACTOR

## (undated) DEVICE — SOL NORMAL USPCA 0.9%

## (undated) DEVICE — WRAP DEMAYO LEG STERILE

## (undated) DEVICE — KIT TRIATHLON CR TIB PREP SZ3

## (undated) DEVICE — SUT MONO 2-0 CT-1 VIL

## (undated) DEVICE — GLOVE 7.0 PROTEXIS PI BLUE

## (undated) DEVICE — SPONGE GAUZE 16PLY 4X4

## (undated) DEVICE — PADDING WYTEX UNDRCST 6INX4YD

## (undated) DEVICE — STAPLER SKIN PROXIMATE WIDE

## (undated) DEVICE — GLOVE PROTEXIS BLUE LATEX 8.5

## (undated) DEVICE — BANDAGE ACE DOUBLE STER 6IN

## (undated) DEVICE — GAUZE SPONGE 4X4 12PLY